# Patient Record
Sex: MALE | Race: WHITE | NOT HISPANIC OR LATINO | Employment: FULL TIME | RURAL
[De-identification: names, ages, dates, MRNs, and addresses within clinical notes are randomized per-mention and may not be internally consistent; named-entity substitution may affect disease eponyms.]

---

## 2019-03-21 ENCOUNTER — CONVERSION ENCOUNTER (OUTPATIENT)
Dept: FAMILY MEDICINE CLINIC | Facility: CLINIC | Age: 39
End: 2019-03-21

## 2019-03-21 LAB
ALBUMIN SERPL-MCNC: 4.9 G/DL (ref 3.6–5.1)
ALP SERPL-CCNC: 43 U/L (ref 40–115)
ALT SERPL-CCNC: 19 U/L (ref 9–46)
AST SERPL-CCNC: 20 U/L (ref 10–40)
BASOPHILS # BLD AUTO: 48 CELLS/UL (ref 0–200)
BASOPHILS NFR BLD AUTO: 1.2 %
BILIRUB SERPL-MCNC: 0.4 MG/DL (ref 0.2–1.2)
BUN SERPL-MCNC: 19 MG/DL (ref 7–25)
BUN/CREAT SERPL: 14 (CALC) (ref 6–22)
CALCIUM SERPL-MCNC: 10 MG/DL (ref 8.6–10.3)
CHLORIDE SERPL-SCNC: 104 MMOL/L (ref 98–110)
CONV CO2: 25 MMOL/L (ref 20–32)
CONV TOTAL PROTEIN: 6.9 G/DL (ref 6.1–8.1)
CREAT UR-MCNC: 1.4 MG/DL (ref 0.6–1.35)
EOSINOPHIL # BLD AUTO: 100 CELLS/UL (ref 15–500)
EOSINOPHIL # BLD AUTO: 2.5 %
ERYTHROCYTE [DISTWIDTH] IN BLOOD BY AUTOMATED COUNT: 12.4 % (ref 11–15)
GLOBULIN UR ELPH-MCNC: 2 MG/DL (ref 1.9–3.7)
GLUCOSE UR QL: 103 MG/DL (ref 65–139)
HCT VFR BLD AUTO: 43 % (ref 38.5–50)
HGB BLD-MCNC: 14.9 G/DL (ref 13.2–17.1)
INSULIN SERPL-ACNC: 2.5 (CALC) (ref 1–2.5)
LYMPHOCYTES # BLD AUTO: 880 CELLS/UL (ref 850–3900)
LYMPHOCYTES NFR BLD AUTO: 22 %
MCH RBC QN AUTO: 32.1 PG (ref 27–33)
MCHC RBC AUTO-ENTMCNC: 34.7 G/DL (ref 32–36)
MCV RBC AUTO: 92.7 FL (ref 80–100)
MONOCYTES # BLD AUTO: 576 CELLS/UL (ref 200–950)
MONOCYTES NFR BLD AUTO: 14.4 %
NEUTROPHILS # BLD AUTO: 2396 CELLS/UL (ref 1500–7800)
NEUTROPHILS NFR BLD AUTO: 59.9 %
PLATELET # BLD AUTO: 245 10*3/UL (ref 140–400)
PMV BLD AUTO: 10.4 FL (ref 7.5–12.5)
POTASSIUM SERPL-SCNC: 4.4 MMOL/L (ref 3.5–5.3)
RBC # BLD AUTO: 4.64 MILLION/UL (ref 4.2–5.8)
SODIUM SERPL-SCNC: 138 MMOL/L (ref 135–146)
TSH SERPL-ACNC: 0.79 MIU/L (ref 0.4–4.5)
WBC # BLD AUTO: 4 10*3/UL (ref 3.8–10.8)

## 2019-08-22 ENCOUNTER — APPOINTMENT (OUTPATIENT)
Dept: CT IMAGING | Facility: HOSPITAL | Age: 39
End: 2019-08-22

## 2019-08-22 ENCOUNTER — HOSPITAL ENCOUNTER (EMERGENCY)
Facility: HOSPITAL | Age: 39
Discharge: HOME | End: 2019-08-22
Admitting: EMERGENCY MEDICINE

## 2019-08-22 VITALS
HEART RATE: 88 BPM | WEIGHT: 160.05 LBS | RESPIRATION RATE: 16 BRPM | OXYGEN SATURATION: 100 % | BODY MASS INDEX: 21.68 KG/M2 | TEMPERATURE: 98 F | DIASTOLIC BLOOD PRESSURE: 89 MMHG | HEIGHT: 72 IN | SYSTOLIC BLOOD PRESSURE: 124 MMHG

## 2019-08-22 DIAGNOSIS — R10.11 RIGHT UPPER QUADRANT ABDOMINAL PAIN: Primary | ICD-10-CM

## 2019-08-22 LAB
ALBUMIN SERPL-MCNC: 4.5 G/DL (ref 3.5–4.8)
ALBUMIN/GLOB SERPL: 2 G/DL (ref 1–1.7)
ALP SERPL-CCNC: 39 U/L (ref 32–91)
ALT SERPL W P-5'-P-CCNC: 20 U/L (ref 17–63)
ANION GAP SERPL CALCULATED.3IONS-SCNC: 15.2 MMOL/L (ref 5–15)
AST SERPL-CCNC: 21 U/L (ref 15–41)
BASOPHILS # BLD AUTO: 0 10*3/MM3 (ref 0–0.2)
BASOPHILS NFR BLD AUTO: 1 % (ref 0–1.5)
BILIRUB SERPL-MCNC: 0.7 MG/DL (ref 0.3–1.2)
BUN BLD-MCNC: 14 MG/DL (ref 8–20)
BUN/CREAT SERPL: 11.7 (ref 6.2–20.3)
CALCIUM SPEC-SCNC: 9.6 MG/DL (ref 8.9–10.3)
CHLORIDE SERPL-SCNC: 106 MMOL/L (ref 101–111)
CO2 SERPL-SCNC: 22 MMOL/L (ref 22–32)
CREAT BLD-MCNC: 1.2 MG/DL (ref 0.7–1.2)
DEPRECATED RDW RBC AUTO: 42.4 FL (ref 37–54)
EOSINOPHIL # BLD AUTO: 0 10*3/MM3 (ref 0–0.4)
EOSINOPHIL NFR BLD AUTO: 0.4 % (ref 0.3–6.2)
ERYTHROCYTE [DISTWIDTH] IN BLOOD BY AUTOMATED COUNT: 12.5 % (ref 12.3–15.4)
GFR SERPL CREATININE-BSD FRML MDRD: 69 ML/MIN/1.73
GLOBULIN UR ELPH-MCNC: 2.3 GM/DL (ref 2.5–3.8)
GLUCOSE BLD-MCNC: 96 MG/DL (ref 65–99)
HCT VFR BLD AUTO: 47.3 % (ref 37.5–51)
HGB BLD-MCNC: 16.2 G/DL (ref 13–17.7)
LIPASE SERPL-CCNC: 34 U/L (ref 22–51)
LYMPHOCYTES # BLD AUTO: 0.9 10*3/MM3 (ref 0.7–3.1)
LYMPHOCYTES NFR BLD AUTO: 24.6 % (ref 19.6–45.3)
MCH RBC QN AUTO: 33.1 PG (ref 26.6–33)
MCHC RBC AUTO-ENTMCNC: 34.2 G/DL (ref 31.5–35.7)
MCV RBC AUTO: 96.6 FL (ref 79–97)
MONOCYTES # BLD AUTO: 0.3 10*3/MM3 (ref 0.1–0.9)
MONOCYTES NFR BLD AUTO: 9.8 % (ref 5–12)
NEUTROPHILS # BLD AUTO: 2.3 10*3/MM3 (ref 1.7–7)
NEUTROPHILS NFR BLD AUTO: 64.2 % (ref 42.7–76)
NRBC BLD AUTO-RTO: 0.1 /100 WBC (ref 0–0.2)
PLATELET # BLD AUTO: 249 10*3/MM3 (ref 140–450)
PMV BLD AUTO: 7.7 FL (ref 6–12)
POTASSIUM BLD-SCNC: 4.2 MMOL/L (ref 3.6–5.1)
PROT SERPL-MCNC: 6.8 G/DL (ref 6.1–7.9)
RBC # BLD AUTO: 4.89 10*6/MM3 (ref 4.14–5.8)
SODIUM BLD-SCNC: 139 MMOL/L (ref 136–144)
WBC NRBC COR # BLD: 3.6 10*3/MM3 (ref 3.4–10.8)

## 2019-08-22 PROCEDURE — 99283 EMERGENCY DEPT VISIT LOW MDM: CPT

## 2019-08-22 PROCEDURE — 25010000002 KETOROLAC TROMETHAMINE PER 15 MG: Performed by: NURSE PRACTITIONER

## 2019-08-22 PROCEDURE — 74177 CT ABD & PELVIS W/CONTRAST: CPT

## 2019-08-22 PROCEDURE — 83690 ASSAY OF LIPASE: CPT | Performed by: NURSE PRACTITIONER

## 2019-08-22 PROCEDURE — 96374 THER/PROPH/DIAG INJ IV PUSH: CPT

## 2019-08-22 PROCEDURE — 85025 COMPLETE CBC W/AUTO DIFF WBC: CPT | Performed by: NURSE PRACTITIONER

## 2019-08-22 PROCEDURE — 0 IOPAMIDOL PER 1 ML: Performed by: NURSE PRACTITIONER

## 2019-08-22 PROCEDURE — 80053 COMPREHEN METABOLIC PANEL: CPT | Performed by: NURSE PRACTITIONER

## 2019-08-22 RX ORDER — TRAMADOL HYDROCHLORIDE 50 MG/1
50 TABLET ORAL EVERY 6 HOURS PRN
Qty: 12 TABLET | Refills: 0 | Status: SHIPPED | OUTPATIENT
Start: 2019-08-22 | End: 2019-12-17

## 2019-08-22 RX ORDER — SODIUM CHLORIDE 0.9 % (FLUSH) 0.9 %
10 SYRINGE (ML) INJECTION AS NEEDED
Status: DISCONTINUED | OUTPATIENT
Start: 2019-08-22 | End: 2019-08-22 | Stop reason: HOSPADM

## 2019-08-22 RX ORDER — KETOROLAC TROMETHAMINE 30 MG/ML
30 INJECTION, SOLUTION INTRAMUSCULAR; INTRAVENOUS ONCE
Status: COMPLETED | OUTPATIENT
Start: 2019-08-22 | End: 2019-08-22

## 2019-08-22 RX ADMIN — KETOROLAC TROMETHAMINE 30 MG: 30 INJECTION, SOLUTION INTRAMUSCULAR at 10:45

## 2019-08-22 RX ADMIN — IOPAMIDOL 100 ML: 755 INJECTION, SOLUTION INTRAVENOUS at 11:56

## 2019-08-22 NOTE — ED PROVIDER NOTES
Subjective   Chief Complaint: Abdominal pain  Context: Patient complains of right upper quadrant abdominal pain that started yesterday.  He reports that has been constant.  He reports that he has had some diarrhea but no nausea, vomiting, fever, or cough.  He has had a prior cholecystectomy.  Duration: Since yesterday  Timing: Constant  Severity: Mild to moderate  Associated symptoms and or modifying factors: As above          History provided by:  Patient      Review of Systems   Constitutional: Negative for chills and fever.   HENT: Negative for congestion and sore throat.    Eyes: Negative for redness.   Respiratory: Negative for cough and shortness of breath.    Cardiovascular: Negative for chest pain.   Gastrointestinal: Positive for abdominal pain and diarrhea. Negative for nausea and vomiting.   Genitourinary: Negative for dysuria.   Musculoskeletal: Negative for back pain.   Skin: Negative for rash.   Neurological: Negative for headaches.   All other systems reviewed and are negative.      Past Medical History:   Diagnosis Date   • Anxiety    • Anxiety    • Depression        Allergies   Allergen Reactions   • Phenergan  [Promethazine Hcl] Hallucinations       Past Surgical History:   Procedure Laterality Date   • APPENDECTOMY     • CHOLECYSTECTOMY     • HYDROCELE EXCISION / REPAIR Left        History reviewed. No pertinent family history.    Social History     Socioeconomic History   • Marital status:      Spouse name: Not on file   • Number of children: Not on file   • Years of education: Not on file   • Highest education level: Not on file   Tobacco Use   • Smoking status: Never Smoker   • Smokeless tobacco: Never Used   Substance and Sexual Activity   • Alcohol use: Yes     Alcohol/week: 1.8 oz     Types: 3 Cans of beer per week   • Drug use: Defer   • Sexual activity: Defer           Objective   Physical Exam  The patient is well-developed, well-nourished, alert, cooperative and in no acute  distress.    HEENT exam is normocephalic and atraumatic. Pupils equal ,round, reactive to light. Extraocular muscles are intact.  Mucous membranes are moist.     Neck is supple, non-tender without lymphadenopathy.   Lungs clear to auscultation bilaterally.    Cardiovascular. Regular rate and rhythm without murmur.    Abdomen is soft nondistended.  Tender to palpate right upper quadrant.  No guarding or rebound noted.     Back shows no CVA tenderness.     Extremities: There is no significant deformity or joint abnormality. No edema.     Neurologic: Oriented x3 without focal neurological deficits.    Skin shows no rash, petechiae, or purpura.    Procedures           ED Course  ED Course as of Aug 22 1218   Thu Aug 22, 2019   1211 WBC 3.6, CMP unremarkable.  Lipase is 34.  CT of the abdomen pelvis has no acute intra-abdominal or pelvic abnormalities.  [AC]      ED Course User Index  [AC] Renee Garcia APRN      Labs Reviewed   COMPREHENSIVE METABOLIC PANEL - Abnormal; Notable for the following components:       Result Value    Globulin 2.3 (*)     A/G Ratio 2.0 (*)     Anion Gap 15.2 (*)     All other components within normal limits   CBC WITH AUTO DIFFERENTIAL - Abnormal; Notable for the following components:    MCH 33.1 (*)     All other components within normal limits   LIPASE - Normal   CBC AND DIFFERENTIAL    Narrative:     The following orders were created for panel order CBC & Differential.  Procedure                               Abnormality         Status                     ---------                               -----------         ------                     CBC Auto Differential[[id number]]        Abnormal            Final result                 Please view results for these tests on the individual orders.     Ct Abdomen Pelvis With Contrast    Result Date: 8/22/2019   1. No acute findings within the abdomen or pelvis. No CT explanation for the patient's abdominal pain. 2. Nonobstructing bilateral renal  "stones. 3. Cholecystectomy, appendectomy.    Electronically Signed By-Dr. Cherie Thompson MD On:8/22/2019 12:05 PM This report was finalized on [id number] by Dr. Cherie Thompson MD.    Medications   sodium chloride 0.9 % flush 10 mL (not administered)   ketorolac (TORADOL) injection 30 mg (30 mg Intravenous Given 8/22/19 1045)   iopamidol (ISOVUE-370) 76 % injection 100 mL (100 mL Intravenous Given 8/22/19 1156)     /87 (BP Location: Left arm, Patient Position: Sitting)   Pulse 88   Temp 98.1 °F (36.7 °C) (Oral)   Resp 16   Ht 182.9 cm (72\")   Wt 72.6 kg (160 lb 0.9 oz)   SpO2 100%   BMI 21.71 kg/m²           MDM  Number of Diagnoses or Management Options  Right upper quadrant abdominal pain:   Diagnosis management comments: MEDICAL DECISION  Comorbidities: Noted in past history  Differentials: biliary obstruction, obstruction, perforation,; this list is not all inclusive and does not constitute the entirety of considered causes  Radiology interpretation:  Images reviewed by me and interpreted by radiologist, CT abdomen pelvis shows no acute intra-abdominal or pelvic abnormalities.  Lab interpretation:  Labs viewed by me significant for, noted above,    Results and plan for discharge were discussed.  Inspect was queried.  Prescription for tramadol.         Amount and/or Complexity of Data Reviewed  Clinical lab tests: reviewed and ordered  Tests in the radiology section of CPT®: reviewed and ordered          Final diagnoses:   Right upper quadrant abdominal pain            Renee Garcia, APRN  08/22/19 1218    "

## 2019-08-22 NOTE — DISCHARGE INSTRUCTIONS
Tylenol or ibuprofen for pain if needed.  Tramadol for more severe pain.  Follow-up with your doctor in the next few days.  Return to the ER for new or worsening concerns.

## 2019-08-22 NOTE — ED NOTES
"Pt c/o RUQ abd pain, \"feeling clammy\" and diarrhea onset 1 day ago.     Clarisse Rider RN  08/22/19 1039    "

## 2019-08-27 ENCOUNTER — OFFICE (OUTPATIENT)
Dept: URBAN - METROPOLITAN AREA CLINIC 64 | Facility: CLINIC | Age: 39
End: 2019-08-27

## 2019-08-27 VITALS
WEIGHT: 160 LBS | HEART RATE: 88 BPM | HEIGHT: 72 IN | DIASTOLIC BLOOD PRESSURE: 86 MMHG | SYSTOLIC BLOOD PRESSURE: 121 MMHG

## 2019-08-27 DIAGNOSIS — R11.0 NAUSEA: ICD-10-CM

## 2019-08-27 DIAGNOSIS — R10.11 RIGHT UPPER QUADRANT PAIN: ICD-10-CM

## 2019-08-27 PROCEDURE — 99203 OFFICE O/P NEW LOW 30 MIN: CPT | Performed by: NURSE PRACTITIONER

## 2019-08-27 RX ORDER — SUCRALFATE 1 G/1
3 TABLET ORAL
Qty: 270 | Refills: 1 | Status: ACTIVE
Start: 2019-08-27

## 2019-08-27 RX ORDER — PANTOPRAZOLE SODIUM 40 MG/1
40 TABLET, DELAYED RELEASE ORAL
Qty: 90 | Refills: 3 | Status: ACTIVE
Start: 2019-08-27

## 2019-12-16 PROBLEM — J30.9 ALLERGIC RHINITIS: Status: ACTIVE | Noted: 2019-12-16

## 2019-12-16 PROBLEM — G43.109 CLASSICAL MIGRAINE: Status: ACTIVE | Noted: 2019-12-16

## 2019-12-16 PROBLEM — K27.9 PUD (PEPTIC ULCER DISEASE): Status: ACTIVE | Noted: 2019-12-16

## 2019-12-16 PROBLEM — L70.2 ACNE VARIOLIFORMIS: Status: ACTIVE | Noted: 2019-12-16

## 2019-12-17 ENCOUNTER — HOSPITAL ENCOUNTER (OUTPATIENT)
Dept: GENERAL RADIOLOGY | Facility: HOSPITAL | Age: 39
Discharge: HOME | End: 2019-12-17
Admitting: FAMILY MEDICINE

## 2019-12-17 ENCOUNTER — OFFICE VISIT (OUTPATIENT)
Dept: FAMILY MEDICINE CLINIC | Facility: CLINIC | Age: 39
End: 2019-12-17

## 2019-12-17 VITALS
OXYGEN SATURATION: 97 % | BODY MASS INDEX: 22.35 KG/M2 | HEART RATE: 94 BPM | WEIGHT: 165 LBS | HEIGHT: 72 IN | TEMPERATURE: 98.5 F | DIASTOLIC BLOOD PRESSURE: 80 MMHG | SYSTOLIC BLOOD PRESSURE: 114 MMHG | RESPIRATION RATE: 18 BRPM

## 2019-12-17 DIAGNOSIS — S61.411D LACERATION OF RIGHT HAND WITHOUT FOREIGN BODY, SUBSEQUENT ENCOUNTER: ICD-10-CM

## 2019-12-17 DIAGNOSIS — M79.641 HAND PAIN, RIGHT: Primary | ICD-10-CM

## 2019-12-17 PROCEDURE — 90471 IMMUNIZATION ADMIN: CPT | Performed by: FAMILY MEDICINE

## 2019-12-17 PROCEDURE — 73130 X-RAY EXAM OF HAND: CPT

## 2019-12-17 PROCEDURE — 99214 OFFICE O/P EST MOD 30 MIN: CPT | Performed by: FAMILY MEDICINE

## 2019-12-17 PROCEDURE — 90714 TD VACC NO PRESV 7 YRS+ IM: CPT | Performed by: FAMILY MEDICINE

## 2019-12-17 RX ORDER — AMOXICILLIN AND CLAVULANATE POTASSIUM 562.5; 437.5; 62.5 MG/1; MG/1; MG/1
2 TABLET, FILM COATED, EXTENDED RELEASE ORAL 2 TIMES DAILY
Qty: 20 TABLET | Refills: 0 | Status: SHIPPED | OUTPATIENT
Start: 2019-12-17 | End: 2021-07-21

## 2019-12-17 RX ORDER — LAMOTRIGINE 200 MG/1
1 TABLET ORAL DAILY
COMMUNITY
Start: 2019-12-16 | End: 2021-07-21

## 2019-12-17 RX ORDER — PANTOPRAZOLE SODIUM 40 MG/1
1 TABLET, DELAYED RELEASE ORAL DAILY
COMMUNITY
Start: 2019-09-11 | End: 2021-07-21

## 2019-12-17 NOTE — PROGRESS NOTES
Subjective   Warren Giraldo is a 38 y.o. male.     Went to [facility] on 11/25/19 due to injury on right knuckle that became infected. Was prescribed Keflex 500mg.     Hand Pain    The incident occurred more than 1 week ago. The incident occurred at home. The injury mechanism was a direct blow (hit hand on light). The pain is present in the right hand (middle knuckle). The quality of the pain is described as aching. Pertinent negatives include no chest pain or numbness. Associated symptoms comments: Red and swollen, pain is now into bone.        The following portions of the patient's history were reviewed and updated as appropriate: allergies, current medications, past family history, past medical history, past social history, past surgical history and problem list.    Patient Active Problem List   Diagnosis   • Acne varioliformis   • Acute stress disorder   • Allergic rhinitis   • Anxiety   • Classical migraine   • Costochondritis   • Depressive disorder   • Gastroesophageal reflux disease with esophagitis   • PUD (peptic ulcer disease)       Current Outpatient Medications on File Prior to Visit   Medication Sig Dispense Refill   • EPINEPHrine (EPIPEN) 0.3 MG/0.3ML solution auto-injector injection Inject 0.3 mg into the appropriate muscle as directed by prescriber.     • lamoTRIgine (LaMICtal) 200 MG tablet Take 1 tablet by mouth Daily.     • LORazepam (ATIVAN) 1 MG tablet TK 1 T PO BID PRN  2   • pantoprazole (PROTONIX) 40 MG EC tablet Take 1 tablet by mouth Daily.     • QUEtiapine XR (SEROQUEL XR) 400 MG 24 hr tablet Take  by mouth Daily.     • venlafaxine XR (EFFEXOR XR) 150 MG 24 hr capsule Take  by mouth.     • [DISCONTINUED] Cetirizine HCl (ZYRTEC ALLERGY) 10 MG capsule Take 1 capsule by mouth Daily.     • [DISCONTINUED] fluticasone (FLONASE) 50 MCG/ACT nasal spray SHAKE LQ AND U 2 SPRAYS IEN D  0   • [DISCONTINUED] lamoTRIgine (LAMICTAL) 100 MG tablet Take  by mouth Daily.     • [DISCONTINUED]  traMADol (ULTRAM) 50 MG tablet Take 1 tablet by mouth Every 6 (Six) Hours As Needed for Moderate Pain . 12 tablet 0     No current facility-administered medications on file prior to visit.        Allergies   Allergen Reactions   • Phenergan  [Promethazine Hcl] Hallucinations       Review of Systems   Constitutional: Negative for activity change, appetite change, fatigue and fever.   HENT: Negative for ear pain, swollen glands and voice change.    Eyes: Negative for visual disturbance.   Respiratory: Negative for shortness of breath and wheezing.    Cardiovascular: Negative for chest pain and leg swelling.   Gastrointestinal: Negative for abdominal pain, blood in stool, constipation, diarrhea, nausea and vomiting.   Endocrine: Negative for polydipsia and polyuria.   Genitourinary: Negative for dysuria, frequency and hematuria.   Musculoskeletal: Negative for joint swelling, neck pain and neck stiffness.   Skin: Negative for rash and bruise.   Neurological: Negative for weakness, numbness and headache.   Psychiatric/Behavioral: Negative for suicidal ideas and depressed mood.       Objective   Vitals:    12/17/19 0832   BP: 114/80   Pulse: 94   Resp: 18   Temp: 98.5 °F (36.9 °C)   SpO2: 97%     Physical Exam   Constitutional: He is oriented to person, place, and time. He appears well-developed and well-nourished.   Eyes: Pupils are equal, round, and reactive to light. Conjunctivae and EOM are normal.   Neck: Normal range of motion. Neck supple.   Cardiovascular: Normal rate, regular rhythm and normal heart sounds.   Pulmonary/Chest: Effort normal and breath sounds normal.   Abdominal: Soft. Bowel sounds are normal.   Musculoskeletal: Normal range of motion.   Enlargement of middle finger MTP joint. Erythematous, tender. No flocculence   Neurological: He is alert and oriented to person, place, and time.   Skin: Skin is warm and dry.   Psychiatric: He has a normal mood and affect. His behavior is normal. Judgment and  thought content normal.         Assessment/Plan .  Problem List Items Addressed This Visit     None      Visit Diagnoses     Hand pain, right    -  Primary    Relevant Orders    CBC Auto Differential    Sedimentation Rate    XR Hand 3+ View Right    Laceration of right hand without foreign body, subsequent encounter        Relevant Medications    amoxicillin-clavulanate XR (AUGMENTIN XR) 1000-62.5 MG per 12 hr tablet    Other Relevant Orders    Td (adult) Vaccine Not Adsorbed    XR Hand 3+ View Right      Findings discussed. All questions answered.  Differential diagnosis discussed.   Follow-up after testing complete, sooner for worsening symptoms or any concerns   Ortho eval if s/sx osteomyelitis show on testing  Medication and medication adverse effects discussed.  Drug education given and explained to patient. Patient verbalized understanding.

## 2019-12-18 LAB
BASOPHILS # BLD AUTO: 0 X10E3/UL (ref 0–0.2)
BASOPHILS NFR BLD AUTO: 1 %
EOSINOPHIL # BLD AUTO: 0.1 X10E3/UL (ref 0–0.4)
EOSINOPHIL NFR BLD AUTO: 3 %
ERYTHROCYTE [DISTWIDTH] IN BLOOD BY AUTOMATED COUNT: 13.3 % (ref 12.3–15.4)
ERYTHROCYTE [SEDIMENTATION RATE] IN BLOOD BY WESTERGREN METHOD: 2 MM/HR (ref 0–15)
HCT VFR BLD AUTO: 46.8 % (ref 37.5–51)
HGB BLD-MCNC: 15.4 G/DL (ref 13–17.7)
IMM GRANULOCYTES # BLD AUTO: 0 X10E3/UL (ref 0–0.1)
IMM GRANULOCYTES NFR BLD AUTO: 1 %
LYMPHOCYTES # BLD AUTO: 1.6 X10E3/UL (ref 0.7–3.1)
LYMPHOCYTES NFR BLD AUTO: 33 %
MCH RBC QN AUTO: 31.8 PG (ref 26.6–33)
MCHC RBC AUTO-ENTMCNC: 32.9 G/DL (ref 31.5–35.7)
MCV RBC AUTO: 97 FL (ref 79–97)
MONOCYTES # BLD AUTO: 0.6 X10E3/UL (ref 0.1–0.9)
MONOCYTES NFR BLD AUTO: 13 %
NEUTROPHILS # BLD AUTO: 2.3 X10E3/UL (ref 1.4–7)
NEUTROPHILS NFR BLD AUTO: 49 %
PLATELET # BLD AUTO: 286 X10E3/UL (ref 150–450)
RBC # BLD AUTO: 4.85 X10E6/UL (ref 4.14–5.8)
WBC # BLD AUTO: 4.8 X10E3/UL (ref 3.4–10.8)

## 2019-12-20 ENCOUNTER — TELEPHONE (OUTPATIENT)
Dept: FAMILY MEDICINE CLINIC | Facility: CLINIC | Age: 39
End: 2019-12-20

## 2019-12-20 NOTE — TELEPHONE ENCOUNTER
----- Message from Warren Giraldo sent at 12/19/2019  3:17 PM EST -----  Regarding: Test Results Question  Contact: [phone]  I have a question about SEDIMENTATION RATE, AUTOMATED resulted on 12/18/19 at 7:36 AM.    Do I continue to take the antibiotics?

## 2020-03-19 ENCOUNTER — TELEPHONE (OUTPATIENT)
Dept: FAMILY MEDICINE CLINIC | Facility: CLINIC | Age: 40
End: 2020-03-19

## 2020-03-19 NOTE — TELEPHONE ENCOUNTER
Pt called.  States he has has a fever for past few days of 101 but fever broke today-also has body aches, h/a, no cough, no shortness of breath.  He's been off work because of this and needs to know when he can return to and will need papers filled out for returning to work. Notified pt per Dr. Sandy wait until 3 days fever free and then f/u for appt and bring paperwork. He will f/u sometime next week.

## 2020-03-20 ENCOUNTER — APPOINTMENT (OUTPATIENT)
Dept: CT IMAGING | Facility: HOSPITAL | Age: 40
End: 2020-03-20

## 2020-03-20 ENCOUNTER — APPOINTMENT (OUTPATIENT)
Dept: GENERAL RADIOLOGY | Facility: HOSPITAL | Age: 40
End: 2020-03-20

## 2020-03-20 ENCOUNTER — HOSPITAL ENCOUNTER (EMERGENCY)
Facility: HOSPITAL | Age: 40
Discharge: HOME | End: 2020-03-20
Admitting: EMERGENCY MEDICINE

## 2020-03-20 VITALS
RESPIRATION RATE: 16 BRPM | WEIGHT: 172.84 LBS | HEIGHT: 72 IN | OXYGEN SATURATION: 99 % | SYSTOLIC BLOOD PRESSURE: 127 MMHG | DIASTOLIC BLOOD PRESSURE: 81 MMHG | HEART RATE: 98 BPM | TEMPERATURE: 98.6 F | BODY MASS INDEX: 23.41 KG/M2

## 2020-03-20 DIAGNOSIS — B34.9 VIRAL SYNDROME: ICD-10-CM

## 2020-03-20 DIAGNOSIS — R50.9 FEVER, UNSPECIFIED FEVER CAUSE: Primary | ICD-10-CM

## 2020-03-20 DIAGNOSIS — R05.9 COUGH: ICD-10-CM

## 2020-03-20 DIAGNOSIS — R51.9 ACUTE INTRACTABLE HEADACHE, UNSPECIFIED HEADACHE TYPE: ICD-10-CM

## 2020-03-20 LAB
ALBUMIN SERPL-MCNC: 4.7 G/DL (ref 3.5–5.2)
ALBUMIN/GLOB SERPL: 2.1 G/DL
ALP SERPL-CCNC: 48 U/L (ref 39–117)
ALT SERPL W P-5'-P-CCNC: 27 U/L (ref 1–41)
ANION GAP SERPL CALCULATED.3IONS-SCNC: 10 MMOL/L (ref 5–15)
AST SERPL-CCNC: 22 U/L (ref 1–40)
B PERT DNA SPEC QL NAA+PROBE: NOT DETECTED
BASOPHILS # BLD AUTO: 0.1 10*3/MM3 (ref 0–0.2)
BASOPHILS NFR BLD AUTO: 1 % (ref 0–1.5)
BILIRUB SERPL-MCNC: 0.3 MG/DL (ref 0.2–1.2)
BUN BLD-MCNC: 11 MG/DL (ref 6–20)
BUN/CREAT SERPL: 10.1 (ref 7–25)
C PNEUM DNA NPH QL NAA+NON-PROBE: NOT DETECTED
CALCIUM SPEC-SCNC: 10 MG/DL (ref 8.6–10.5)
CHLORIDE SERPL-SCNC: 104 MMOL/L (ref 98–107)
CO2 SERPL-SCNC: 26 MMOL/L (ref 22–29)
CREAT BLD-MCNC: 1.09 MG/DL (ref 0.76–1.27)
DEPRECATED RDW RBC AUTO: 41.1 FL (ref 37–54)
EOSINOPHIL # BLD AUTO: 0.1 10*3/MM3 (ref 0–0.4)
EOSINOPHIL NFR BLD AUTO: 1 % (ref 0.3–6.2)
ERYTHROCYTE [DISTWIDTH] IN BLOOD BY AUTOMATED COUNT: 12.4 % (ref 12.3–15.4)
FLUAV H1 2009 PAND RNA NPH QL NAA+PROBE: NOT DETECTED
FLUAV H1 HA GENE NPH QL NAA+PROBE: NOT DETECTED
FLUAV H3 RNA NPH QL NAA+PROBE: NOT DETECTED
FLUAV SUBTYP SPEC NAA+PROBE: NOT DETECTED
FLUBV RNA ISLT QL NAA+PROBE: NOT DETECTED
GFR SERPL CREATININE-BSD FRML MDRD: 76 ML/MIN/1.73
GLOBULIN UR ELPH-MCNC: 2.2 GM/DL
GLUCOSE BLD-MCNC: 118 MG/DL (ref 65–99)
HADV DNA SPEC NAA+PROBE: NOT DETECTED
HCOV 229E RNA SPEC QL NAA+PROBE: NOT DETECTED
HCOV HKU1 RNA SPEC QL NAA+PROBE: NOT DETECTED
HCOV NL63 RNA SPEC QL NAA+PROBE: NOT DETECTED
HCOV OC43 RNA SPEC QL NAA+PROBE: NOT DETECTED
HCT VFR BLD AUTO: 44.4 % (ref 37.5–51)
HGB BLD-MCNC: 15.7 G/DL (ref 13–17.7)
HMPV RNA NPH QL NAA+NON-PROBE: NOT DETECTED
HPIV1 RNA SPEC QL NAA+PROBE: NOT DETECTED
HPIV2 RNA SPEC QL NAA+PROBE: NOT DETECTED
HPIV3 RNA NPH QL NAA+PROBE: NOT DETECTED
HPIV4 P GENE NPH QL NAA+PROBE: NOT DETECTED
LYMPHOCYTES # BLD AUTO: 1.2 10*3/MM3 (ref 0.7–3.1)
LYMPHOCYTES NFR BLD AUTO: 22.3 % (ref 19.6–45.3)
M PNEUMO IGG SER IA-ACNC: NOT DETECTED
MCH RBC QN AUTO: 33.2 PG (ref 26.6–33)
MCHC RBC AUTO-ENTMCNC: 35.4 G/DL (ref 31.5–35.7)
MCV RBC AUTO: 93.9 FL (ref 79–97)
MONOCYTES # BLD AUTO: 0.7 10*3/MM3 (ref 0.1–0.9)
MONOCYTES NFR BLD AUTO: 12.3 % (ref 5–12)
NEUTROPHILS # BLD AUTO: 3.3 10*3/MM3 (ref 1.7–7)
NEUTROPHILS NFR BLD AUTO: 63.4 % (ref 42.7–76)
NRBC BLD AUTO-RTO: 0 /100 WBC (ref 0–0.2)
PLATELET # BLD AUTO: 274 10*3/MM3 (ref 140–450)
PMV BLD AUTO: 7.3 FL (ref 6–12)
POTASSIUM BLD-SCNC: 5 MMOL/L (ref 3.5–5.2)
PROT SERPL-MCNC: 6.9 G/DL (ref 6–8.5)
RBC # BLD AUTO: 4.73 10*6/MM3 (ref 4.14–5.8)
RHINOVIRUS RNA SPEC NAA+PROBE: NOT DETECTED
RSV RNA NPH QL NAA+NON-PROBE: NOT DETECTED
SODIUM BLD-SCNC: 140 MMOL/L (ref 136–145)
WBC NRBC COR # BLD: 5.3 10*3/MM3 (ref 3.4–10.8)

## 2020-03-20 PROCEDURE — 96375 TX/PRO/DX INJ NEW DRUG ADDON: CPT

## 2020-03-20 PROCEDURE — 25010000002 DIPHENHYDRAMINE PER 50 MG: Performed by: PHYSICIAN ASSISTANT

## 2020-03-20 PROCEDURE — 87635 SARS-COV-2 COVID-19 AMP PRB: CPT | Performed by: PHYSICIAN ASSISTANT

## 2020-03-20 PROCEDURE — 71045 X-RAY EXAM CHEST 1 VIEW: CPT

## 2020-03-20 PROCEDURE — 70450 CT HEAD/BRAIN W/O DYE: CPT

## 2020-03-20 PROCEDURE — 80053 COMPREHEN METABOLIC PANEL: CPT | Performed by: PHYSICIAN ASSISTANT

## 2020-03-20 PROCEDURE — 25010000002 METOCLOPRAMIDE PER 10 MG: Performed by: PHYSICIAN ASSISTANT

## 2020-03-20 PROCEDURE — U0003 INFECTIOUS AGENT DETECTION BY NUCLEIC ACID (DNA OR RNA); SEVERE ACUTE RESPIRATORY SYNDROME CORONAVIRUS 2 (SARS-COV-2) (CORONAVIRUS DISEASE [COVID-19]), AMPLIFIED PROBE TECHNIQUE, MAKING USE OF HIGH THROUGHPUT TECHNOLOGIES AS DESCRIBED BY CMS-2020-01-R: HCPCS | Performed by: PHYSICIAN ASSISTANT

## 2020-03-20 PROCEDURE — 99284 EMERGENCY DEPT VISIT MOD MDM: CPT

## 2020-03-20 PROCEDURE — 85025 COMPLETE CBC W/AUTO DIFF WBC: CPT | Performed by: PHYSICIAN ASSISTANT

## 2020-03-20 PROCEDURE — 0099U HC BIOFIRE FILMARRAY RESP PANEL 1: CPT | Performed by: PHYSICIAN ASSISTANT

## 2020-03-20 PROCEDURE — 96374 THER/PROPH/DIAG INJ IV PUSH: CPT

## 2020-03-20 RX ORDER — DIPHENHYDRAMINE HYDROCHLORIDE 50 MG/ML
25 INJECTION INTRAMUSCULAR; INTRAVENOUS ONCE
Status: COMPLETED | OUTPATIENT
Start: 2020-03-20 | End: 2020-03-20

## 2020-03-20 RX ORDER — ACETAMINOPHEN 500 MG
1000 TABLET ORAL ONCE
Status: COMPLETED | OUTPATIENT
Start: 2020-03-20 | End: 2020-03-20

## 2020-03-20 RX ORDER — SODIUM CHLORIDE 0.9 % (FLUSH) 0.9 %
10 SYRINGE (ML) INJECTION AS NEEDED
Status: DISCONTINUED | OUTPATIENT
Start: 2020-03-20 | End: 2020-03-21 | Stop reason: HOSPADM

## 2020-03-20 RX ORDER — METOCLOPRAMIDE HYDROCHLORIDE 5 MG/ML
10 INJECTION INTRAMUSCULAR; INTRAVENOUS ONCE
Status: COMPLETED | OUTPATIENT
Start: 2020-03-20 | End: 2020-03-20

## 2020-03-20 RX ORDER — ALBUTEROL SULFATE 90 UG/1
2 AEROSOL, METERED RESPIRATORY (INHALATION) EVERY 4 HOURS PRN
Qty: 1 INHALER | Refills: 0 | Status: SHIPPED | OUTPATIENT
Start: 2020-03-20 | End: 2021-07-21

## 2020-03-20 RX ORDER — BENZONATATE 200 MG/1
200 CAPSULE ORAL 3 TIMES DAILY PRN
Qty: 30 CAPSULE | Refills: 0 | Status: SHIPPED | OUTPATIENT
Start: 2020-03-20 | End: 2021-07-21

## 2020-03-20 RX ADMIN — DIPHENHYDRAMINE HYDROCHLORIDE 25 MG: 50 INJECTION, SOLUTION INTRAMUSCULAR; INTRAVENOUS at 21:23

## 2020-03-20 RX ADMIN — ACETAMINOPHEN 1000 MG: 500 TABLET, FILM COATED ORAL at 19:36

## 2020-03-20 RX ADMIN — SODIUM CHLORIDE 1000 ML: 900 INJECTION, SOLUTION INTRAVENOUS at 19:36

## 2020-03-20 RX ADMIN — METOCLOPRAMIDE 10 MG: 5 INJECTION, SOLUTION INTRAMUSCULAR; INTRAVENOUS at 21:23

## 2020-03-23 ENCOUNTER — TELEPHONE (OUTPATIENT)
Dept: FAMILY MEDICINE CLINIC | Facility: CLINIC | Age: 40
End: 2020-03-23

## 2020-03-23 NOTE — TELEPHONE ENCOUNTER
Warren went to [facility] ER on 3/20/20 due to URI symptoms. Was tested for COVID 19. Patient called asking about test results, if he needed to come in to be seen and if he needed to stay in isolation. Informed pt results were not back and to stay in isolation for at least 2 weeks. Says he's feeling fine now, no symptoms.     He also asked when he could return to work. Please advise.

## 2020-03-25 ENCOUNTER — E-VISIT (OUTPATIENT)
Dept: FAMILY MEDICINE CLINIC | Facility: TELEHEALTH | Age: 40
End: 2020-03-25

## 2020-03-25 NOTE — PROGRESS NOTES
I counseled the patient to follow up with Patient Mistake     He was asking when he could expect test results for an ER visit where he was tested for covid19.

## 2020-03-30 ENCOUNTER — TELEPHONE (OUTPATIENT)
Dept: FAMILY MEDICINE CLINIC | Facility: CLINIC | Age: 40
End: 2020-03-30

## 2020-03-30 NOTE — TELEPHONE ENCOUNTER
Pt called.  States he went to ER on 3-20 and was tested for covid 19. Pt states he got a phone call stating it was negative and needs a note to return to work.  Informed pt the CoV-2 test shows still pending in computer so we will need evidence showing it is negative before we can give a work note. Also if it is negative he cannot go back to work until 4-4 (14 days from his testing on 3-20) He will look into finding results and will let us know.

## 2020-03-31 ENCOUNTER — TELEPHONE (OUTPATIENT)
Dept: FAMILY MEDICINE CLINIC | Facility: CLINIC | Age: 40
End: 2020-03-31

## 2020-03-31 NOTE — TELEPHONE ENCOUNTER
Spoke with pt regarding work note.  Per Dr. Sandy ok to give note to return to work on 4-4 regardless of test results because pt has been quarantined for 14 days and is symptom free.  Note left up front to .

## 2020-04-02 ENCOUNTER — TELEPHONE (OUTPATIENT)
Dept: FAMILY MEDICINE CLINIC | Facility: CLINIC | Age: 40
End: 2020-04-02

## 2020-04-02 LAB — SARS-COV-2 RNA RESP QL NAA+PROBE: NOT DETECTED

## 2020-04-02 NOTE — TELEPHONE ENCOUNTER
Pt called.  States employer needs another return to work note-will not accept the one given.  Must state Covid-19 negative on note. Pt notified we just received lab result today so note is fine.  Will leave up front to .

## 2021-07-23 ENCOUNTER — OFFICE VISIT (OUTPATIENT)
Dept: FAMILY MEDICINE CLINIC | Facility: CLINIC | Age: 41
End: 2021-07-23

## 2021-07-23 VITALS
SYSTOLIC BLOOD PRESSURE: 124 MMHG | HEART RATE: 102 BPM | BODY MASS INDEX: 22.59 KG/M2 | TEMPERATURE: 97.9 F | HEIGHT: 72 IN | WEIGHT: 166.8 LBS | OXYGEN SATURATION: 98 % | RESPIRATION RATE: 18 BRPM | DIASTOLIC BLOOD PRESSURE: 85 MMHG

## 2021-07-23 DIAGNOSIS — R53.83 MALAISE AND FATIGUE: Primary | ICD-10-CM
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
## 2021-07-23 DIAGNOSIS — R53.81 MALAISE AND FATIGUE: Primary | ICD-10-CM

## 2021-07-23 DIAGNOSIS — T78.1XXA ALLERGIC REACTION TO ALPHA-GAL: ICD-10-CM

## 2021-07-23 DIAGNOSIS — R06.02 SHORTNESS OF BREATH: ICD-10-CM

## 2021-07-23 PROCEDURE — 99213 OFFICE O/P EST LOW 20 MIN: CPT | Performed by: FAMILY MEDICINE

## 2021-07-23 RX ORDER — PREDNISONE 20 MG/1
40 TABLET ORAL DAILY
Qty: 10 TABLET | Refills: 0 | Status: SHIPPED | OUTPATIENT
Start: 2021-07-23 | End: 2021-07-28

## 2021-07-23 RX ORDER — ALBUTEROL SULFATE 90 UG/1
2 AEROSOL, METERED RESPIRATORY (INHALATION) EVERY 4 HOURS PRN
Qty: 18 G | Refills: 2 | Status: ON HOLD | OUTPATIENT
Start: 2021-07-23 | End: 2021-08-03

## 2021-07-23 RX ORDER — DOXYCYCLINE 100 MG/1
100 CAPSULE ORAL 2 TIMES DAILY
Qty: 28 CAPSULE | Refills: 0 | Status: SHIPPED | OUTPATIENT
Start: 2021-07-23 | End: 2021-08-06

## 2021-07-24 LAB
25(OH)D3+25(OH)D2 SERPL-MCNC: 31 NG/ML (ref 30–100)
ALBUMIN SERPL-MCNC: 4.8 G/DL (ref 4–5)
ALBUMIN/GLOB SERPL: 1.9 {RATIO} (ref 1.2–2.2)
ALP SERPL-CCNC: 59 IU/L (ref 48–121)
ALT SERPL-CCNC: 21 IU/L (ref 0–44)
AST SERPL-CCNC: 19 IU/L (ref 0–40)
BASOPHILS # BLD AUTO: 0 X10E3/UL (ref 0–0.2)
BASOPHILS NFR BLD AUTO: 0 %
BILIRUB SERPL-MCNC: 0.3 MG/DL (ref 0–1.2)
BUN SERPL-MCNC: 14 MG/DL (ref 6–20)
BUN/CREAT SERPL: 13 (ref 9–20)
CALCIUM SERPL-MCNC: 9.3 MG/DL (ref 8.7–10.2)
CHLORIDE SERPL-SCNC: 105 MMOL/L (ref 96–106)
CO2 SERPL-SCNC: 23 MMOL/L (ref 20–29)
CREAT SERPL-MCNC: 1.05 MG/DL (ref 0.76–1.27)
EOSINOPHIL # BLD AUTO: 0 X10E3/UL (ref 0–0.4)
EOSINOPHIL NFR BLD AUTO: 0 %
ERYTHROCYTE [DISTWIDTH] IN BLOOD BY AUTOMATED COUNT: 12.9 % (ref 11.6–15.4)
FOLATE SERPL-MCNC: 17.8 NG/ML
GLOBULIN SER CALC-MCNC: 2.5 G/DL (ref 1.5–4.5)
GLUCOSE SERPL-MCNC: 109 MG/DL (ref 65–99)
HCT VFR BLD AUTO: 48.4 % (ref 37.5–51)
HGB BLD-MCNC: 16.3 G/DL (ref 13–17.7)
IMM GRANULOCYTES # BLD AUTO: 0 X10E3/UL (ref 0–0.1)
IMM GRANULOCYTES NFR BLD AUTO: 1 %
LYMPHOCYTES # BLD AUTO: 0.7 X10E3/UL (ref 0.7–3.1)
LYMPHOCYTES NFR BLD AUTO: 12 %
MCH RBC QN AUTO: 32.4 PG (ref 26.6–33)
MCHC RBC AUTO-ENTMCNC: 33.7 G/DL (ref 31.5–35.7)
MCV RBC AUTO: 96 FL (ref 79–97)
MONOCYTES # BLD AUTO: 0.2 X10E3/UL (ref 0.1–0.9)
MONOCYTES NFR BLD AUTO: 3 %
NEUTROPHILS # BLD AUTO: 5.2 X10E3/UL (ref 1.4–7)
NEUTROPHILS NFR BLD AUTO: 84 %
PLATELET # BLD AUTO: 303 X10E3/UL (ref 150–450)
POTASSIUM SERPL-SCNC: 5 MMOL/L (ref 3.5–5.2)
PROT SERPL-MCNC: 7.3 G/DL (ref 6–8.5)
RBC # BLD AUTO: 5.03 X10E6/UL (ref 4.14–5.8)
SODIUM SERPL-SCNC: 142 MMOL/L (ref 134–144)
TSH SERPL DL<=0.005 MIU/L-ACNC: 0.47 UIU/ML (ref 0.45–4.5)
VIT B12 SERPL-MCNC: 579 PG/ML (ref 232–1245)
WBC # BLD AUTO: 6.2 X10E3/UL (ref 3.4–10.8)

## 2021-07-31 ENCOUNTER — APPOINTMENT (OUTPATIENT)
Dept: CT IMAGING | Facility: HOSPITAL | Age: 41
End: 2021-07-31

## 2021-07-31 ENCOUNTER — APPOINTMENT (OUTPATIENT)
Dept: GENERAL RADIOLOGY | Facility: HOSPITAL | Age: 41
End: 2021-07-31

## 2021-07-31 ENCOUNTER — HOSPITAL ENCOUNTER (INPATIENT)
Facility: HOSPITAL | Age: 41
LOS: 3 days | Discharge: HOME | End: 2021-08-04
Attending: EMERGENCY MEDICINE | Admitting: INTERNAL MEDICINE

## 2021-07-31 DIAGNOSIS — T78.1XXA ALLERGIC REACTION TO ALPHA-GAL: ICD-10-CM

## 2021-07-31 DIAGNOSIS — J96.00 ACUTE RESPIRATORY FAILURE, UNSPECIFIED WHETHER WITH HYPOXIA OR HYPERCAPNIA (HCC): Primary | ICD-10-CM

## 2021-07-31 LAB
ALBUMIN SERPL-MCNC: 4.7 G/DL (ref 3.5–5.2)
ALBUMIN/GLOB SERPL: 2 G/DL
ALP SERPL-CCNC: 53 U/L (ref 39–117)
ALT SERPL W P-5'-P-CCNC: 24 U/L (ref 1–41)
AMPHET+METHAMPHET UR QL: NEGATIVE
ANION GAP SERPL CALCULATED.3IONS-SCNC: 17 MMOL/L (ref 5–15)
APAP SERPL-MCNC: <5 MCG/ML (ref 0–30)
APTT PPP: 20.6 SECONDS (ref 24–31)
ARTERIAL PATENCY WRIST A: POSITIVE
AST SERPL-CCNC: 23 U/L (ref 1–40)
ATMOSPHERIC PRESS: ABNORMAL MM[HG]
BARBITURATES UR QL SCN: NEGATIVE
BASE EXCESS BLDA CALC-SCNC: -2 MMOL/L (ref 0–3)
BASOPHILS # BLD AUTO: 0.1 10*3/MM3 (ref 0–0.2)
BASOPHILS NFR BLD AUTO: 0.6 % (ref 0–1.5)
BDY SITE: ABNORMAL
BENZODIAZ UR QL SCN: NEGATIVE
BILIRUB SERPL-MCNC: 0.3 MG/DL (ref 0–1.2)
BILIRUB UR QL STRIP: NEGATIVE
BUN SERPL-MCNC: 12 MG/DL (ref 6–20)
BUN/CREAT SERPL: 9.8 (ref 7–25)
CA-I BLDA-SCNC: 0.83 MMOL/L (ref 1.15–1.33)
CALCIUM SPEC-SCNC: 9.5 MG/DL (ref 8.6–10.5)
CANNABINOIDS SERPL QL: NEGATIVE
CHLORIDE SERPL-SCNC: 106 MMOL/L (ref 98–107)
CK SERPL-CCNC: 109 U/L (ref 20–200)
CLARITY UR: CLEAR
CO2 BLDA-SCNC: 19.4 MMOL/L (ref 22–29)
CO2 SERPL-SCNC: 20 MMOL/L (ref 22–29)
COCAINE UR QL: NEGATIVE
COLOR UR: YELLOW
CREAT SERPL-MCNC: 1.23 MG/DL (ref 0.76–1.27)
D-LACTATE SERPL-SCNC: 6.5 MMOL/L (ref 0.5–2)
DEPRECATED RDW RBC AUTO: 43.3 FL (ref 37–54)
EOSINOPHIL # BLD AUTO: 0.1 10*3/MM3 (ref 0–0.4)
EOSINOPHIL NFR BLD AUTO: 1.4 % (ref 0.3–6.2)
ERYTHROCYTE [DISTWIDTH] IN BLOOD BY AUTOMATED COUNT: 12.9 % (ref 12.3–15.4)
ETHANOL UR QL: 0.16 %
GFR SERPL CREATININE-BSD FRML MDRD: 66 ML/MIN/1.73
GLOBULIN UR ELPH-MCNC: 2.3 GM/DL
GLUCOSE BLDC GLUCOMTR-MCNC: 110 MG/DL (ref 74–100)
GLUCOSE BLDC GLUCOMTR-MCNC: 110 MG/DL (ref 74–100)
GLUCOSE SERPL-MCNC: 99 MG/DL (ref 65–99)
GLUCOSE UR STRIP-MCNC: NEGATIVE MG/DL
HCO3 BLDA-SCNC: 18.7 MMOL/L (ref 21–28)
HCT VFR BLD AUTO: 47.9 % (ref 37.5–51)
HCT VFR BLDA CALC: 43 % (ref 38–51)
HEMODILUTION: NO
HGB BLD-MCNC: 16.2 G/DL (ref 13–17.7)
HGB BLDA-MCNC: 14.7 G/DL (ref 12–17)
HGB UR QL STRIP.AUTO: NEGATIVE
INHALED O2 CONCENTRATION: 40 %
INR PPP: 1.01 (ref 0.93–1.1)
KETONES UR QL STRIP: NEGATIVE
LEUKOCYTE ESTERASE UR QL STRIP.AUTO: NEGATIVE
LIPASE SERPL-CCNC: 29 U/L (ref 13–60)
LYMPHOCYTES # BLD AUTO: 2.5 10*3/MM3 (ref 0.7–3.1)
LYMPHOCYTES NFR BLD AUTO: 30.8 % (ref 19.6–45.3)
MAGNESIUM SERPL-MCNC: 2.2 MG/DL (ref 1.6–2.6)
MCH RBC QN AUTO: 32.6 PG (ref 26.6–33)
MCHC RBC AUTO-ENTMCNC: 33.7 G/DL (ref 31.5–35.7)
MCV RBC AUTO: 96.6 FL (ref 79–97)
METHADONE UR QL SCN: NEGATIVE
MODALITY: ABNORMAL
MONOCYTES # BLD AUTO: 0.9 10*3/MM3 (ref 0.1–0.9)
MONOCYTES NFR BLD AUTO: 10.7 % (ref 5–12)
NEUTROPHILS NFR BLD AUTO: 4.5 10*3/MM3 (ref 1.7–7)
NEUTROPHILS NFR BLD AUTO: 56.5 % (ref 42.7–76)
NITRITE UR QL STRIP: NEGATIVE
NRBC BLD AUTO-RTO: 0.1 /100 WBC (ref 0–0.2)
OPIATES UR QL: NEGATIVE
OXYCODONE UR QL SCN: NEGATIVE
PCO2 BLDA: 22.6 MM HG (ref 35–48)
PH BLDA: 7.53 PH UNITS (ref 7.35–7.45)
PH UR STRIP.AUTO: 6 [PH] (ref 5–8)
PLATELET # BLD AUTO: 314 10*3/MM3 (ref 140–450)
PMV BLD AUTO: 8.2 FL (ref 6–12)
PO2 BLDA: 114.6 MM HG (ref 83–108)
POTASSIUM BLDA-SCNC: 4.1 MMOL/L (ref 3.5–4.5)
POTASSIUM SERPL-SCNC: 4.2 MMOL/L (ref 3.5–5.2)
PROCALCITONIN SERPL-MCNC: 0.03 NG/ML (ref 0–0.25)
PROT SERPL-MCNC: 7 G/DL (ref 6–8.5)
PROT UR QL STRIP: NEGATIVE
PROTHROMBIN TIME: 11.2 SECONDS (ref 9.6–11.7)
QT INTERVAL: 339 MS
RBC # BLD AUTO: 4.96 10*6/MM3 (ref 4.14–5.8)
SALICYLATES SERPL-MCNC: <0.3 MG/DL
SAO2 % BLDCOA: 99 % (ref 94–98)
SODIUM BLD-SCNC: 144 MMOL/L (ref 138–146)
SODIUM SERPL-SCNC: 143 MMOL/L (ref 136–145)
SP GR UR STRIP: <=1.005 (ref 1–1.03)
TROPONIN T SERPL-MCNC: <0.01 NG/ML (ref 0–0.03)
TSH SERPL DL<=0.05 MIU/L-ACNC: 1.66 UIU/ML (ref 0.27–4.2)
UROBILINOGEN UR QL STRIP: NORMAL
WBC # BLD AUTO: 8 10*3/MM3 (ref 3.4–10.8)

## 2021-07-31 PROCEDURE — 85610 PROTHROMBIN TIME: CPT | Performed by: EMERGENCY MEDICINE

## 2021-07-31 PROCEDURE — 84443 ASSAY THYROID STIM HORMONE: CPT | Performed by: EMERGENCY MEDICINE

## 2021-07-31 PROCEDURE — 0 IOPAMIDOL PER 1 ML: Performed by: EMERGENCY MEDICINE

## 2021-07-31 PROCEDURE — 71045 X-RAY EXAM CHEST 1 VIEW: CPT

## 2021-07-31 PROCEDURE — 82962 GLUCOSE BLOOD TEST: CPT

## 2021-07-31 PROCEDURE — 83690 ASSAY OF LIPASE: CPT | Performed by: EMERGENCY MEDICINE

## 2021-07-31 PROCEDURE — 80307 DRUG TEST PRSMV CHEM ANLYZR: CPT | Performed by: EMERGENCY MEDICINE

## 2021-07-31 PROCEDURE — 81003 URINALYSIS AUTO W/O SCOPE: CPT | Performed by: EMERGENCY MEDICINE

## 2021-07-31 PROCEDURE — 74177 CT ABD & PELVIS W/CONTRAST: CPT

## 2021-07-31 PROCEDURE — 84484 ASSAY OF TROPONIN QUANT: CPT | Performed by: EMERGENCY MEDICINE

## 2021-07-31 PROCEDURE — 36600 WITHDRAWAL OF ARTERIAL BLOOD: CPT

## 2021-07-31 PROCEDURE — 80143 DRUG ASSAY ACETAMINOPHEN: CPT | Performed by: EMERGENCY MEDICINE

## 2021-07-31 PROCEDURE — 84145 PROCALCITONIN (PCT): CPT | Performed by: EMERGENCY MEDICINE

## 2021-07-31 PROCEDURE — 82550 ASSAY OF CK (CPK): CPT | Performed by: EMERGENCY MEDICINE

## 2021-07-31 PROCEDURE — 25010000002 METHYLPREDNISOLONE PER 125 MG: Performed by: EMERGENCY MEDICINE

## 2021-07-31 PROCEDURE — 82803 BLOOD GASES ANY COMBINATION: CPT

## 2021-07-31 PROCEDURE — 25010000002 PROPOFOL 10 MG/ML EMULSION

## 2021-07-31 PROCEDURE — 94002 VENT MGMT INPAT INIT DAY: CPT

## 2021-07-31 PROCEDURE — 71275 CT ANGIOGRAPHY CHEST: CPT

## 2021-07-31 PROCEDURE — 25010000002 SUCCINYLCHOLINE PER 20 MG

## 2021-07-31 PROCEDURE — 85730 THROMBOPLASTIN TIME PARTIAL: CPT | Performed by: EMERGENCY MEDICINE

## 2021-07-31 PROCEDURE — 80179 DRUG ASSAY SALICYLATE: CPT | Performed by: EMERGENCY MEDICINE

## 2021-07-31 PROCEDURE — 25010000002 ONDANSETRON PER 1 MG

## 2021-07-31 PROCEDURE — 0BH17EZ INSERTION OF ENDOTRACHEAL AIRWAY INTO TRACHEA, VIA NATURAL OR ARTIFICIAL OPENING: ICD-10-PCS | Performed by: EMERGENCY MEDICINE

## 2021-07-31 PROCEDURE — 83605 ASSAY OF LACTIC ACID: CPT

## 2021-07-31 PROCEDURE — 94799 UNLISTED PULMONARY SVC/PX: CPT

## 2021-07-31 PROCEDURE — 80053 COMPREHEN METABOLIC PANEL: CPT | Performed by: EMERGENCY MEDICINE

## 2021-07-31 PROCEDURE — 99291 CRITICAL CARE FIRST HOUR: CPT

## 2021-07-31 PROCEDURE — 82077 ASSAY SPEC XCP UR&BREATH IA: CPT | Performed by: EMERGENCY MEDICINE

## 2021-07-31 PROCEDURE — 93005 ELECTROCARDIOGRAM TRACING: CPT | Performed by: EMERGENCY MEDICINE

## 2021-07-31 PROCEDURE — 82330 ASSAY OF CALCIUM: CPT

## 2021-07-31 PROCEDURE — 85018 HEMOGLOBIN: CPT

## 2021-07-31 PROCEDURE — 70450 CT HEAD/BRAIN W/O DYE: CPT

## 2021-07-31 PROCEDURE — 80051 ELECTROLYTE PANEL: CPT

## 2021-07-31 PROCEDURE — 5A1935Z RESPIRATORY VENTILATION, LESS THAN 24 CONSECUTIVE HOURS: ICD-10-PCS | Performed by: EMERGENCY MEDICINE

## 2021-07-31 PROCEDURE — 25010000002 LORAZEPAM PER 2 MG

## 2021-07-31 PROCEDURE — 31500 INSERT EMERGENCY AIRWAY: CPT

## 2021-07-31 PROCEDURE — U0003 INFECTIOUS AGENT DETECTION BY NUCLEIC ACID (DNA OR RNA); SEVERE ACUTE RESPIRATORY SYNDROME CORONAVIRUS 2 (SARS-COV-2) (CORONAVIRUS DISEASE [COVID-19]), AMPLIFIED PROBE TECHNIQUE, MAKING USE OF HIGH THROUGHPUT TECHNOLOGIES AS DESCRIBED BY CMS-2020-01-R: HCPCS | Performed by: EMERGENCY MEDICINE

## 2021-07-31 PROCEDURE — 85025 COMPLETE CBC W/AUTO DIFF WBC: CPT | Performed by: EMERGENCY MEDICINE

## 2021-07-31 PROCEDURE — 25010000002 CEFEPIME PER 500 MG: Performed by: EMERGENCY MEDICINE

## 2021-07-31 PROCEDURE — 83735 ASSAY OF MAGNESIUM: CPT | Performed by: EMERGENCY MEDICINE

## 2021-07-31 PROCEDURE — 87040 BLOOD CULTURE FOR BACTERIA: CPT | Performed by: EMERGENCY MEDICINE

## 2021-07-31 RX ORDER — SUCCINYLCHOLINE CHLORIDE 20 MG/ML
INJECTION INTRAMUSCULAR; INTRAVENOUS
Status: COMPLETED
Start: 2021-07-31 | End: 2021-07-31

## 2021-07-31 RX ORDER — VENLAFAXINE HYDROCHLORIDE 225 MG/1
225 TABLET, EXTENDED RELEASE ORAL
COMMUNITY
End: 2022-02-07

## 2021-07-31 RX ORDER — SODIUM CHLORIDE 0.9 % (FLUSH) 0.9 %
10 SYRINGE (ML) INJECTION EVERY 12 HOURS SCHEDULED
Status: DISCONTINUED | OUTPATIENT
Start: 2021-07-31 | End: 2021-08-04 | Stop reason: HOSPADM

## 2021-07-31 RX ORDER — SODIUM CHLORIDE 0.9 % (FLUSH) 0.9 %
10 SYRINGE (ML) INJECTION AS NEEDED
Status: DISCONTINUED | OUTPATIENT
Start: 2021-07-31 | End: 2021-08-04 | Stop reason: HOSPADM

## 2021-07-31 RX ORDER — VECURONIUM BROMIDE 1 MG/ML
INJECTION, POWDER, LYOPHILIZED, FOR SOLUTION INTRAVENOUS
Status: COMPLETED
Start: 2021-07-31 | End: 2021-07-31

## 2021-07-31 RX ORDER — ACETAMINOPHEN 650 MG/1
650 SUPPOSITORY RECTAL EVERY 4 HOURS PRN
Status: DISCONTINUED | OUTPATIENT
Start: 2021-07-31 | End: 2021-08-04 | Stop reason: HOSPADM

## 2021-07-31 RX ORDER — ONDANSETRON 2 MG/ML
4 INJECTION INTRAMUSCULAR; INTRAVENOUS EVERY 6 HOURS PRN
Status: DISCONTINUED | OUTPATIENT
Start: 2021-07-31 | End: 2021-08-04 | Stop reason: HOSPADM

## 2021-07-31 RX ORDER — PROPOFOL 10 MG/ML
VIAL (ML) INTRAVENOUS
Status: COMPLETED
Start: 2021-07-31 | End: 2021-07-31

## 2021-07-31 RX ORDER — LORAZEPAM 2 MG/ML
1 INJECTION INTRAMUSCULAR ONCE
Status: COMPLETED | OUTPATIENT
Start: 2021-07-31 | End: 2021-07-31

## 2021-07-31 RX ORDER — METHYLPREDNISOLONE SODIUM SUCCINATE 125 MG/2ML
125 INJECTION, POWDER, LYOPHILIZED, FOR SOLUTION INTRAMUSCULAR; INTRAVENOUS ONCE
Status: COMPLETED | OUTPATIENT
Start: 2021-07-31 | End: 2021-07-31

## 2021-07-31 RX ORDER — NITROGLYCERIN 0.4 MG/1
0.4 TABLET SUBLINGUAL
Status: DISCONTINUED | OUTPATIENT
Start: 2021-07-31 | End: 2021-08-04 | Stop reason: HOSPADM

## 2021-07-31 RX ORDER — ACETAMINOPHEN 325 MG/1
650 TABLET ORAL EVERY 4 HOURS PRN
Status: DISCONTINUED | OUTPATIENT
Start: 2021-07-31 | End: 2021-08-04 | Stop reason: HOSPADM

## 2021-07-31 RX ORDER — FENTANYL CITRATE-0.9 % NACL/PF 10 MCG/ML
100 PLASTIC BAG, INJECTION (ML) INTRAVENOUS
Status: DISCONTINUED | OUTPATIENT
Start: 2021-08-01 | End: 2021-08-01

## 2021-07-31 RX ORDER — PROPOFOL 10 MG/ML
VIAL (ML) INTRAVENOUS
Status: DISCONTINUED
Start: 2021-07-31 | End: 2021-07-31 | Stop reason: WASHOUT

## 2021-07-31 RX ORDER — SUCRALFATE 1 G/1
1 TABLET ORAL 3 TIMES DAILY
Status: ON HOLD | COMMUNITY
End: 2021-08-02

## 2021-07-31 RX ORDER — WATER 1000 ML/1000ML
10 INJECTION, SOLUTION INTRAVENOUS ONCE
Status: COMPLETED | OUTPATIENT
Start: 2021-07-31 | End: 2021-07-31

## 2021-07-31 RX ORDER — WATER 1000 ML/1000ML
INJECTION, SOLUTION INTRAVENOUS
Status: COMPLETED
Start: 2021-07-31 | End: 2021-07-31

## 2021-07-31 RX ORDER — CHLORHEXIDINE GLUCONATE 0.12 MG/ML
15 RINSE ORAL EVERY 12 HOURS SCHEDULED
Status: DISCONTINUED | OUTPATIENT
Start: 2021-08-01 | End: 2021-08-01

## 2021-07-31 RX ORDER — SUCCINYLCHOLINE CHLORIDE 20 MG/ML
100 INJECTION INTRAMUSCULAR; INTRAVENOUS ONCE
Status: COMPLETED | OUTPATIENT
Start: 2021-07-31 | End: 2021-07-31

## 2021-07-31 RX ORDER — VECURONIUM BROMIDE 1 MG/ML
10 INJECTION, POWDER, LYOPHILIZED, FOR SOLUTION INTRAVENOUS ONCE
Status: COMPLETED | OUTPATIENT
Start: 2021-07-31 | End: 2021-07-31

## 2021-07-31 RX ORDER — ONDANSETRON 2 MG/ML
INJECTION INTRAMUSCULAR; INTRAVENOUS
Status: COMPLETED
Start: 2021-07-31 | End: 2021-07-31

## 2021-07-31 RX ORDER — LORAZEPAM 2 MG/ML
INJECTION INTRAMUSCULAR
Status: COMPLETED
Start: 2021-07-31 | End: 2021-07-31

## 2021-07-31 RX ORDER — ONDANSETRON 4 MG/1
4 TABLET, FILM COATED ORAL EVERY 6 HOURS PRN
Status: DISCONTINUED | OUTPATIENT
Start: 2021-07-31 | End: 2021-08-04 | Stop reason: HOSPADM

## 2021-07-31 RX ADMIN — LORAZEPAM 1 MG: 2 INJECTION INTRAMUSCULAR; INTRAVENOUS at 21:25

## 2021-07-31 RX ADMIN — VECURONIUM BROMIDE 10 MG: 1 INJECTION, POWDER, LYOPHILIZED, FOR SOLUTION INTRAVENOUS at 22:05

## 2021-07-31 RX ADMIN — ONDANSETRON 4 MG: 2 INJECTION INTRAMUSCULAR; INTRAVENOUS at 23:36

## 2021-07-31 RX ADMIN — PROPOFOL 5 MCG/KG/MIN: 10 INJECTION, EMULSION INTRAVENOUS at 21:39

## 2021-07-31 RX ADMIN — SODIUM CHLORIDE 2271 ML: 9 INJECTION, SOLUTION INTRAVENOUS at 21:34

## 2021-07-31 RX ADMIN — IOPAMIDOL 100 ML: 755 INJECTION, SOLUTION INTRAVENOUS at 22:24

## 2021-07-31 RX ADMIN — WATER 10 ML: 1000 INJECTION, SOLUTION INTRAVENOUS at 22:05

## 2021-07-31 RX ADMIN — SUCCINYLCHOLINE CHLORIDE 100 MG: 20 INJECTION INTRAMUSCULAR; INTRAVENOUS at 21:55

## 2021-07-31 RX ADMIN — WATER 10 ML: 1 INJECTION INTRAMUSCULAR; INTRAVENOUS; SUBCUTANEOUS at 22:05

## 2021-07-31 RX ADMIN — FAMOTIDINE 20 MG: 10 INJECTION INTRAVENOUS at 23:37

## 2021-07-31 RX ADMIN — CEFEPIME 2 G: 2 INJECTION, POWDER, FOR SOLUTION INTRAVENOUS at 23:18

## 2021-07-31 RX ADMIN — LORAZEPAM 1 MG: 2 INJECTION INTRAMUSCULAR at 21:25

## 2021-07-31 RX ADMIN — METHYLPREDNISOLONE SODIUM SUCCINATE 125 MG: 125 INJECTION, POWDER, FOR SOLUTION INTRAMUSCULAR; INTRAVENOUS at 23:18

## 2021-07-31 RX ADMIN — SODIUM CHLORIDE 1000 ML: 9 INJECTION, SOLUTION INTRAVENOUS at 21:33

## 2021-08-01 ENCOUNTER — APPOINTMENT (OUTPATIENT)
Dept: ULTRASOUND IMAGING | Facility: HOSPITAL | Age: 41
End: 2021-08-01

## 2021-08-01 ENCOUNTER — APPOINTMENT (OUTPATIENT)
Dept: GENERAL RADIOLOGY | Facility: HOSPITAL | Age: 41
End: 2021-08-01

## 2021-08-01 PROBLEM — J96.00 ACUTE RESPIRATORY FAILURE (HCC): Status: ACTIVE | Noted: 2021-08-01

## 2021-08-01 PROBLEM — T78.00XA ANAPHYLAXIS DUE TO FOOD: Status: ACTIVE | Noted: 2021-08-01

## 2021-08-01 PROBLEM — A41.9 SEPSIS WITHOUT SEPTIC SHOCK: Status: ACTIVE | Noted: 2021-08-01

## 2021-08-01 PROBLEM — F41.8 MIXED ANXIETY DEPRESSIVE DISORDER: Status: ACTIVE | Noted: 2021-08-01

## 2021-08-01 PROBLEM — J69.0 ASPIRATION PNEUMONIA (HCC): Status: ACTIVE | Noted: 2021-08-01

## 2021-08-01 PROBLEM — R09.2 RESPIRATORY ARREST: Status: ACTIVE | Noted: 2021-08-01

## 2021-08-01 LAB
ALBUMIN SERPL-MCNC: 4.3 G/DL (ref 3.5–5.2)
ALBUMIN/GLOB SERPL: 2.4 G/DL
ALP SERPL-CCNC: 51 U/L (ref 39–117)
ALT SERPL W P-5'-P-CCNC: 21 U/L (ref 1–41)
ANION GAP SERPL CALCULATED.3IONS-SCNC: 11 MMOL/L (ref 5–15)
ARTERIAL PATENCY WRIST A: POSITIVE
AST SERPL-CCNC: 20 U/L (ref 1–40)
ATMOSPHERIC PRESS: ABNORMAL MM[HG]
BASE EXCESS BLDA CALC-SCNC: -1.9 MMOL/L (ref 0–3)
BASOPHILS # BLD AUTO: 0 10*3/MM3 (ref 0–0.2)
BASOPHILS NFR BLD AUTO: 0.4 % (ref 0–1.5)
BDY SITE: ABNORMAL
BILIRUB SERPL-MCNC: 0.2 MG/DL (ref 0–1.2)
BUN SERPL-MCNC: 12 MG/DL (ref 6–20)
BUN/CREAT SERPL: 12.1 (ref 7–25)
CALCIUM SPEC-SCNC: 8.3 MG/DL (ref 8.6–10.5)
CHLORIDE SERPL-SCNC: 105 MMOL/L (ref 98–107)
CO2 BLDA-SCNC: 27.5 MMOL/L (ref 22–29)
CO2 SERPL-SCNC: 23 MMOL/L (ref 22–29)
CREAT SERPL-MCNC: 0.99 MG/DL (ref 0.76–1.27)
CRP SERPL-MCNC: <0.3 MG/DL (ref 0–0.5)
D-LACTATE SERPL-SCNC: 1.4 MMOL/L (ref 0.5–2)
D-LACTATE SERPL-SCNC: 1.6 MMOL/L (ref 0.5–2)
D-LACTATE SERPL-SCNC: 2 MMOL/L (ref 0.5–2)
D-LACTATE SERPL-SCNC: 2.2 MMOL/L (ref 0.5–2)
DEPRECATED RDW RBC AUTO: 43.8 FL (ref 37–54)
EOSINOPHIL # BLD AUTO: 0 10*3/MM3 (ref 0–0.4)
EOSINOPHIL NFR BLD AUTO: 0 % (ref 0.3–6.2)
ERYTHROCYTE [DISTWIDTH] IN BLOOD BY AUTOMATED COUNT: 13.1 % (ref 12.3–15.4)
GFR SERPL CREATININE-BSD FRML MDRD: 85 ML/MIN/1.73
GLOBULIN UR ELPH-MCNC: 1.8 GM/DL
GLUCOSE BLDC GLUCOMTR-MCNC: 116 MG/DL (ref 70–105)
GLUCOSE BLDC GLUCOMTR-MCNC: 119 MG/DL (ref 70–105)
GLUCOSE BLDC GLUCOMTR-MCNC: 158 MG/DL (ref 70–105)
GLUCOSE SERPL-MCNC: 129 MG/DL (ref 65–99)
HCO3 BLDA-SCNC: 25.8 MMOL/L (ref 21–28)
HCT VFR BLD AUTO: 43.7 % (ref 37.5–51)
HEMODILUTION: NO
HGB BLD-MCNC: 14.7 G/DL (ref 13–17.7)
HOLD SPECIMEN: NORMAL
INHALED O2 CONCENTRATION: 50 %
LYMPHOCYTES # BLD AUTO: 0.4 10*3/MM3 (ref 0.7–3.1)
LYMPHOCYTES NFR BLD AUTO: 4.9 % (ref 19.6–45.3)
MAGNESIUM SERPL-MCNC: 2 MG/DL (ref 1.6–2.6)
MCH RBC QN AUTO: 32.5 PG (ref 26.6–33)
MCHC RBC AUTO-ENTMCNC: 33.7 G/DL (ref 31.5–35.7)
MCV RBC AUTO: 96.4 FL (ref 79–97)
MODALITY: ABNORMAL
MONOCYTES # BLD AUTO: 0.1 10*3/MM3 (ref 0.1–0.9)
MONOCYTES NFR BLD AUTO: 0.7 % (ref 5–12)
NEUTROPHILS NFR BLD AUTO: 7.8 10*3/MM3 (ref 1.7–7)
NEUTROPHILS NFR BLD AUTO: 94 % (ref 42.7–76)
NRBC BLD AUTO-RTO: 0 /100 WBC (ref 0–0.2)
PAW @ PEAK INSP FLOW SETTING VENT: 11 CMH2O
PCO2 BLDA: 54.8 MM HG (ref 35–48)
PEEP RESPIRATORY: 5 CM[H2O]
PH BLDA: 7.28 PH UNITS (ref 7.35–7.45)
PHOSPHATE SERPL-MCNC: 3.5 MG/DL (ref 2.5–4.5)
PLATELET # BLD AUTO: 286 10*3/MM3 (ref 140–450)
PMV BLD AUTO: 8.3 FL (ref 6–12)
PO2 BLDA: 212.1 MM HG (ref 83–108)
POTASSIUM SERPL-SCNC: 4.8 MMOL/L (ref 3.5–5.2)
PROCALCITONIN SERPL-MCNC: <0.02 NG/ML (ref 0–0.25)
PROT SERPL-MCNC: 6.1 G/DL (ref 6–8.5)
RBC # BLD AUTO: 4.53 10*6/MM3 (ref 4.14–5.8)
RESPIRATORY RATE: 18
SAO2 % BLDCOA: 99.6 % (ref 94–98)
SARS-COV-2 RNA PNL SPEC NAA+PROBE: NOT DETECTED
SODIUM SERPL-SCNC: 139 MMOL/L (ref 136–145)
TOTAL RATE: 22 BREATHS/MINUTE
VENTILATOR MODE: ABNORMAL
VT ON VENT VENT: 400 ML
WBC # BLD AUTO: 8.3 10*3/MM3 (ref 3.4–10.8)

## 2021-08-01 PROCEDURE — 25010000002 METHYLPREDNISOLONE PER 40 MG: Performed by: NURSE PRACTITIONER

## 2021-08-01 PROCEDURE — 25010000002 PROPOFOL 10 MG/ML EMULSION: Performed by: NURSE PRACTITIONER

## 2021-08-01 PROCEDURE — 25010000002 METHYLPREDNISOLONE PER 125 MG

## 2021-08-01 PROCEDURE — 94640 AIRWAY INHALATION TREATMENT: CPT

## 2021-08-01 PROCEDURE — 25010000002 FONDAPARINUX PER 0.5 MG: Performed by: INTERNAL MEDICINE

## 2021-08-01 PROCEDURE — 94799 UNLISTED PULMONARY SVC/PX: CPT

## 2021-08-01 PROCEDURE — 71045 X-RAY EXAM CHEST 1 VIEW: CPT

## 2021-08-01 PROCEDURE — 82803 BLOOD GASES ANY COMBINATION: CPT

## 2021-08-01 PROCEDURE — 83605 ASSAY OF LACTIC ACID: CPT | Performed by: NURSE PRACTITIONER

## 2021-08-01 PROCEDURE — 25010000002 FENTANYL CITRATE (PF) 2500 MCG/50ML SOLUTION: Performed by: EMERGENCY MEDICINE

## 2021-08-01 PROCEDURE — 86140 C-REACTIVE PROTEIN: CPT | Performed by: NURSE PRACTITIONER

## 2021-08-01 PROCEDURE — 25010000002 FENTANYL CITRATE (PF) 2500 MCG/50ML SOLUTION: Performed by: NURSE PRACTITIONER

## 2021-08-01 PROCEDURE — 36415 COLL VENOUS BLD VENIPUNCTURE: CPT | Performed by: NURSE PRACTITIONER

## 2021-08-01 PROCEDURE — 25010000002 PROPOFOL 10 MG/ML EMULSION: Performed by: EMERGENCY MEDICINE

## 2021-08-01 PROCEDURE — 76775 US EXAM ABDO BACK WALL LIM: CPT

## 2021-08-01 PROCEDURE — 83605 ASSAY OF LACTIC ACID: CPT

## 2021-08-01 PROCEDURE — 25010000002 DIPHENHYDRAMINE PER 50 MG: Performed by: NURSE PRACTITIONER

## 2021-08-01 PROCEDURE — 0BH17EZ INSERTION OF ENDOTRACHEAL AIRWAY INTO TRACHEA, VIA NATURAL OR ARTIFICIAL OPENING: ICD-10-PCS | Performed by: INTERNAL MEDICINE

## 2021-08-01 PROCEDURE — 85025 COMPLETE CBC W/AUTO DIFF WBC: CPT | Performed by: NURSE PRACTITIONER

## 2021-08-01 PROCEDURE — 74018 RADEX ABDOMEN 1 VIEW: CPT

## 2021-08-01 PROCEDURE — 36600 WITHDRAWAL OF ARTERIAL BLOOD: CPT

## 2021-08-01 PROCEDURE — 84100 ASSAY OF PHOSPHORUS: CPT | Performed by: NURSE PRACTITIONER

## 2021-08-01 PROCEDURE — 83735 ASSAY OF MAGNESIUM: CPT | Performed by: NURSE PRACTITIONER

## 2021-08-01 PROCEDURE — 25010000002 MIDAZOLAM 50 MG/10ML SOLUTION: Performed by: NURSE PRACTITIONER

## 2021-08-01 PROCEDURE — 25010000002 MIDAZOLAM PER 1 MG

## 2021-08-01 PROCEDURE — 25010000002 FENTANYL CITRATE (PF) 50 MCG/ML SOLUTION

## 2021-08-01 PROCEDURE — 25010000002 LORAZEPAM PER 2 MG

## 2021-08-01 PROCEDURE — 84145 PROCALCITONIN (PCT): CPT | Performed by: NURSE PRACTITIONER

## 2021-08-01 PROCEDURE — 80053 COMPREHEN METABOLIC PANEL: CPT | Performed by: NURSE PRACTITIONER

## 2021-08-01 PROCEDURE — 94002 VENT MGMT INPAT INIT DAY: CPT

## 2021-08-01 PROCEDURE — 82962 GLUCOSE BLOOD TEST: CPT

## 2021-08-01 PROCEDURE — 5A1935Z RESPIRATORY VENTILATION, LESS THAN 24 CONSECUTIVE HOURS: ICD-10-PCS | Performed by: INTERNAL MEDICINE

## 2021-08-01 PROCEDURE — 25010000002 ONDANSETRON PER 1 MG: Performed by: NURSE PRACTITIONER

## 2021-08-01 PROCEDURE — 25010000002 PROPOFOL 10 MG/ML EMULSION

## 2021-08-01 RX ORDER — MIDAZOLAM HYDROCHLORIDE 1 MG/ML
2 INJECTION INTRAMUSCULAR; INTRAVENOUS ONCE
Status: COMPLETED | OUTPATIENT
Start: 2021-08-01 | End: 2021-08-01

## 2021-08-01 RX ORDER — FENTANYL CITRATE 50 UG/ML
INJECTION, SOLUTION INTRAMUSCULAR; INTRAVENOUS
Status: COMPLETED
Start: 2021-08-01 | End: 2021-08-01

## 2021-08-01 RX ORDER — PROPOFOL 10 MG/ML
VIAL (ML) INTRAVENOUS
Status: COMPLETED
Start: 2021-08-01 | End: 2021-08-01

## 2021-08-01 RX ORDER — IPRATROPIUM BROMIDE AND ALBUTEROL SULFATE 2.5; .5 MG/3ML; MG/3ML
3 SOLUTION RESPIRATORY (INHALATION)
Status: DISCONTINUED | OUTPATIENT
Start: 2021-08-01 | End: 2021-08-03

## 2021-08-01 RX ORDER — MIDAZOLAM HYDROCHLORIDE 1 MG/ML
2 INJECTION INTRAMUSCULAR; INTRAVENOUS ONCE
Status: CANCELLED | OUTPATIENT
Start: 2021-08-01 | End: 2021-08-01

## 2021-08-01 RX ORDER — ETOMIDATE 2 MG/ML
30 INJECTION INTRAVENOUS ONCE
Status: CANCELLED | OUTPATIENT
Start: 2021-08-01 | End: 2021-08-01

## 2021-08-01 RX ORDER — ETOMIDATE 2 MG/ML
INJECTION INTRAVENOUS
Status: COMPLETED
Start: 2021-08-01 | End: 2021-08-01

## 2021-08-01 RX ORDER — RISPERIDONE 0.25 MG/1
0.25 TABLET ORAL EVERY 12 HOURS SCHEDULED
Status: DISCONTINUED | OUTPATIENT
Start: 2021-08-01 | End: 2021-08-03

## 2021-08-01 RX ORDER — METHYLPREDNISOLONE SODIUM SUCCINATE 40 MG/ML
40 INJECTION, POWDER, LYOPHILIZED, FOR SOLUTION INTRAMUSCULAR; INTRAVENOUS EVERY 6 HOURS
Status: DISCONTINUED | OUTPATIENT
Start: 2021-08-01 | End: 2021-08-02

## 2021-08-01 RX ORDER — SODIUM CHLORIDE 9 MG/ML
50 INJECTION, SOLUTION INTRAVENOUS CONTINUOUS
Status: DISCONTINUED | OUTPATIENT
Start: 2021-08-01 | End: 2021-08-03

## 2021-08-01 RX ORDER — LORAZEPAM 2 MG/ML
INJECTION INTRAMUSCULAR
Status: COMPLETED
Start: 2021-08-01 | End: 2021-08-01

## 2021-08-01 RX ORDER — MIDAZOLAM HYDROCHLORIDE 1 MG/ML
INJECTION INTRAMUSCULAR; INTRAVENOUS
Status: COMPLETED
Start: 2021-08-01 | End: 2021-08-01

## 2021-08-01 RX ORDER — DEXMEDETOMIDINE HYDROCHLORIDE 4 UG/ML
.2-1.5 INJECTION, SOLUTION INTRAVENOUS
Status: DISCONTINUED | OUTPATIENT
Start: 2021-08-01 | End: 2021-08-03

## 2021-08-01 RX ORDER — FENTANYL CITRATE-0.9 % NACL/PF 10 MCG/ML
50-300 PLASTIC BAG, INJECTION (ML) INTRAVENOUS
Status: DISCONTINUED | OUTPATIENT
Start: 2021-08-01 | End: 2021-08-02

## 2021-08-01 RX ORDER — FENTANYL CITRATE 50 UG/ML
100 INJECTION, SOLUTION INTRAMUSCULAR; INTRAVENOUS ONCE
Status: CANCELLED | OUTPATIENT
Start: 2021-08-01

## 2021-08-01 RX ORDER — NICOTINE POLACRILEX 4 MG
15 LOZENGE BUCCAL
Status: DISCONTINUED | OUTPATIENT
Start: 2021-08-01 | End: 2021-08-04 | Stop reason: HOSPADM

## 2021-08-01 RX ORDER — WATER 1000 ML/1000ML
INJECTION, SOLUTION INTRAVENOUS
Status: DISPENSED
Start: 2021-08-01 | End: 2021-08-02

## 2021-08-01 RX ORDER — DEXTROSE MONOHYDRATE 25 G/50ML
25 INJECTION, SOLUTION INTRAVENOUS
Status: DISCONTINUED | OUTPATIENT
Start: 2021-08-01 | End: 2021-08-04 | Stop reason: HOSPADM

## 2021-08-01 RX ORDER — QUETIAPINE FUMARATE 25 MG/1
50 TABLET, FILM COATED ORAL NIGHTLY
Status: DISCONTINUED | OUTPATIENT
Start: 2021-08-01 | End: 2021-08-01

## 2021-08-01 RX ORDER — VECURONIUM BROMIDE 1 MG/ML
INJECTION, POWDER, LYOPHILIZED, FOR SOLUTION INTRAVENOUS
Status: COMPLETED
Start: 2021-08-01 | End: 2021-08-01

## 2021-08-01 RX ORDER — INSULIN LISPRO 100 [IU]/ML
0-7 INJECTION, SOLUTION INTRAVENOUS; SUBCUTANEOUS AS NEEDED
Status: DISCONTINUED | OUTPATIENT
Start: 2021-08-01 | End: 2021-08-03

## 2021-08-01 RX ORDER — METHYLPREDNISOLONE SODIUM SUCCINATE 125 MG/2ML
125 INJECTION, POWDER, LYOPHILIZED, FOR SOLUTION INTRAMUSCULAR; INTRAVENOUS ONCE
Status: COMPLETED | OUTPATIENT
Start: 2021-08-01 | End: 2021-08-01

## 2021-08-01 RX ORDER — METHYLPREDNISOLONE SODIUM SUCCINATE 40 MG/ML
20 INJECTION, POWDER, LYOPHILIZED, FOR SOLUTION INTRAMUSCULAR; INTRAVENOUS EVERY 6 HOURS
Status: DISCONTINUED | OUTPATIENT
Start: 2021-08-01 | End: 2021-08-01

## 2021-08-01 RX ORDER — MIDAZOLAM HYDROCHLORIDE 1 MG/ML
1-10 INJECTION, SOLUTION INTRAVENOUS
Status: DISCONTINUED | OUTPATIENT
Start: 2021-08-01 | End: 2021-08-02

## 2021-08-01 RX ORDER — QUETIAPINE FUMARATE 25 MG/1
50 TABLET, FILM COATED ORAL NIGHTLY
Status: DISCONTINUED | OUTPATIENT
Start: 2021-08-01 | End: 2021-08-02

## 2021-08-01 RX ORDER — DOXYCYCLINE 100 MG/1
100 TABLET ORAL EVERY 12 HOURS SCHEDULED
Status: DISCONTINUED | OUTPATIENT
Start: 2021-08-01 | End: 2021-08-04 | Stop reason: HOSPADM

## 2021-08-01 RX ORDER — BUDESONIDE 0.5 MG/2ML
0.5 INHALANT ORAL
Status: DISCONTINUED | OUTPATIENT
Start: 2021-08-01 | End: 2021-08-03

## 2021-08-01 RX ORDER — DIPHENHYDRAMINE HYDROCHLORIDE 50 MG/ML
25 INJECTION INTRAMUSCULAR; INTRAVENOUS EVERY 6 HOURS
Status: DISCONTINUED | OUTPATIENT
Start: 2021-08-01 | End: 2021-08-03

## 2021-08-01 RX ORDER — VECURONIUM BROMIDE 1 MG/ML
10 INJECTION, POWDER, LYOPHILIZED, FOR SOLUTION INTRAVENOUS
Status: DISCONTINUED | OUTPATIENT
Start: 2021-08-01 | End: 2021-08-03

## 2021-08-01 RX ORDER — LORAZEPAM 2 MG/ML
1 INJECTION INTRAMUSCULAR ONCE
Status: COMPLETED | OUTPATIENT
Start: 2021-08-01 | End: 2021-08-01

## 2021-08-01 RX ORDER — DEXMEDETOMIDINE HYDROCHLORIDE 4 UG/ML
.2-1.5 INJECTION, SOLUTION INTRAVENOUS
Status: DISCONTINUED | OUTPATIENT
Start: 2021-08-01 | End: 2021-08-01

## 2021-08-01 RX ORDER — IPRATROPIUM BROMIDE AND ALBUTEROL SULFATE 2.5; .5 MG/3ML; MG/3ML
3 SOLUTION RESPIRATORY (INHALATION)
Status: DISCONTINUED | OUTPATIENT
Start: 2021-08-01 | End: 2021-08-01

## 2021-08-01 RX ORDER — INSULIN LISPRO 100 [IU]/ML
0-7 INJECTION, SOLUTION INTRAVENOUS; SUBCUTANEOUS EVERY 6 HOURS SCHEDULED
Status: DISCONTINUED | OUTPATIENT
Start: 2021-08-01 | End: 2021-08-03

## 2021-08-01 RX ORDER — METHYLPREDNISOLONE SODIUM SUCCINATE 125 MG/2ML
INJECTION, POWDER, LYOPHILIZED, FOR SOLUTION INTRAMUSCULAR; INTRAVENOUS
Status: COMPLETED
Start: 2021-08-01 | End: 2021-08-01

## 2021-08-01 RX ORDER — WATER 1000 ML/1000ML
INJECTION, SOLUTION INTRAVENOUS
Status: COMPLETED
Start: 2021-08-01 | End: 2021-08-01

## 2021-08-01 RX ORDER — FONDAPARINUX SODIUM 2.5 MG/.5ML
2.5 INJECTION SUBCUTANEOUS
Status: DISCONTINUED | OUTPATIENT
Start: 2021-08-01 | End: 2021-08-04 | Stop reason: HOSPADM

## 2021-08-01 RX ADMIN — MIDAZOLAM 2 MG: 1 INJECTION INTRAMUSCULAR; INTRAVENOUS at 14:14

## 2021-08-01 RX ADMIN — MIDAZOLAM 2 MG: 1 INJECTION INTRAMUSCULAR; INTRAVENOUS at 00:56

## 2021-08-01 RX ADMIN — FENTANYL CITRATE 260 MCG/HR: 50 INJECTION, SOLUTION INTRAMUSCULAR; INTRAVENOUS at 22:33

## 2021-08-01 RX ADMIN — DEXMEDETOMIDINE HYDROCHLORIDE 1 MCG/KG/HR: 100 INJECTION, SOLUTION INTRAVENOUS at 07:28

## 2021-08-01 RX ADMIN — WATER 10 ML: 1 INJECTION INTRAMUSCULAR; INTRAVENOUS; SUBCUTANEOUS at 14:18

## 2021-08-01 RX ADMIN — DEXMEDETOMIDINE HYDROCHLORIDE 0.5 MCG/KG/HR: 100 INJECTION, SOLUTION INTRAVENOUS at 21:39

## 2021-08-01 RX ADMIN — MIDAZOLAM HYDROCHLORIDE 2 MG: 1 INJECTION INTRAMUSCULAR; INTRAVENOUS at 05:08

## 2021-08-01 RX ADMIN — Medication 10 ML: at 09:21

## 2021-08-01 RX ADMIN — FAMOTIDINE 20 MG: 10 INJECTION INTRAVENOUS at 09:21

## 2021-08-01 RX ADMIN — DIPHENHYDRAMINE HYDROCHLORIDE 25 MG: 50 INJECTION, SOLUTION INTRAMUSCULAR; INTRAVENOUS at 07:26

## 2021-08-01 RX ADMIN — DOXYCYCLINE 100 MG: 100 TABLET, FILM COATED ORAL at 21:14

## 2021-08-01 RX ADMIN — IPRATROPIUM BROMIDE AND ALBUTEROL SULFATE 3 ML: 2.5; .5 SOLUTION RESPIRATORY (INHALATION) at 06:57

## 2021-08-01 RX ADMIN — FONDAPARINUX SODIUM 2.5 MG: 2.5 INJECTION SUBCUTANEOUS at 17:01

## 2021-08-01 RX ADMIN — FENTANYL CITRATE 200 MCG/HR: 50 INJECTION, SOLUTION INTRAMUSCULAR; INTRAVENOUS at 14:17

## 2021-08-01 RX ADMIN — MIDAZOLAM 2 MG/HR: 5 INJECTION INTRAMUSCULAR; INTRAVENOUS at 14:17

## 2021-08-01 RX ADMIN — METHYLPREDNISOLONE SODIUM SUCCINATE 125 MG: 125 INJECTION, POWDER, FOR SOLUTION INTRAMUSCULAR; INTRAVENOUS at 14:15

## 2021-08-01 RX ADMIN — METHYLPREDNISOLONE SODIUM SUCCINATE 40 MG: 40 INJECTION, POWDER, FOR SOLUTION INTRAMUSCULAR; INTRAVENOUS at 21:13

## 2021-08-01 RX ADMIN — Medication 10 ML: at 21:16

## 2021-08-01 RX ADMIN — MIDAZOLAM HYDROCHLORIDE 2 MG: 1 INJECTION INTRAMUSCULAR; INTRAVENOUS at 14:13

## 2021-08-01 RX ADMIN — MIDAZOLAM HYDROCHLORIDE 2 MG: 1 INJECTION INTRAMUSCULAR; INTRAVENOUS at 00:56

## 2021-08-01 RX ADMIN — ONDANSETRON 4 MG: 2 INJECTION INTRAMUSCULAR; INTRAVENOUS at 13:24

## 2021-08-01 RX ADMIN — PROPOFOL 50 MCG/KG/MIN: 10 INJECTION, EMULSION INTRAVENOUS at 21:13

## 2021-08-01 RX ADMIN — DIPHENHYDRAMINE HYDROCHLORIDE 25 MG: 50 INJECTION, SOLUTION INTRAMUSCULAR; INTRAVENOUS at 21:13

## 2021-08-01 RX ADMIN — PROPOFOL 1000 MG: 10 INJECTION, EMULSION INTRAVENOUS at 17:00

## 2021-08-01 RX ADMIN — VECURONIUM BROMIDE 10 MG: 1 INJECTION, POWDER, LYOPHILIZED, FOR SOLUTION INTRAVENOUS at 14:16

## 2021-08-01 RX ADMIN — MIDAZOLAM 2 MG: 1 INJECTION INTRAMUSCULAR; INTRAVENOUS at 04:43

## 2021-08-01 RX ADMIN — METHYLPREDNISOLONE SODIUM SUCCINATE 20 MG: 40 INJECTION, POWDER, FOR SOLUTION INTRAMUSCULAR; INTRAVENOUS at 07:27

## 2021-08-01 RX ADMIN — METHYLPREDNISOLONE SODIUM SUCCINATE 20 MG: 40 INJECTION, POWDER, FOR SOLUTION INTRAMUSCULAR; INTRAVENOUS at 13:25

## 2021-08-01 RX ADMIN — DIPHENHYDRAMINE HYDROCHLORIDE 25 MG: 50 INJECTION, SOLUTION INTRAMUSCULAR; INTRAVENOUS at 13:25

## 2021-08-01 RX ADMIN — MIDAZOLAM HYDROCHLORIDE 2 MG: 1 INJECTION INTRAMUSCULAR; INTRAVENOUS at 04:43

## 2021-08-01 RX ADMIN — MIDAZOLAM 7 MG/HR: 5 INJECTION INTRAMUSCULAR; INTRAVENOUS at 19:47

## 2021-08-01 RX ADMIN — VECURONIUM BROMIDE 10 MG: 1 INJECTION, POWDER, LYOPHILIZED, FOR SOLUTION INTRAVENOUS at 20:48

## 2021-08-01 RX ADMIN — IPRATROPIUM BROMIDE AND ALBUTEROL SULFATE 3 ML: 2.5; .5 SOLUTION RESPIRATORY (INHALATION) at 19:42

## 2021-08-01 RX ADMIN — QUETIAPINE FUMARATE 50 MG: 25 TABLET ORAL at 21:14

## 2021-08-01 RX ADMIN — MIDAZOLAM 2 MG: 1 INJECTION INTRAMUSCULAR; INTRAVENOUS at 05:08

## 2021-08-01 RX ADMIN — FAMOTIDINE 20 MG: 10 INJECTION INTRAVENOUS at 21:13

## 2021-08-01 RX ADMIN — ETOMIDATE 40 MG: 40 INJECTION, SOLUTION INTRAVENOUS at 14:13

## 2021-08-01 RX ADMIN — BUDESONIDE 0.5 MG: 0.5 SUSPENSION RESPIRATORY (INHALATION) at 19:42

## 2021-08-01 RX ADMIN — RISPERIDONE 0.25 MG: 0.25 TABLET ORAL at 21:14

## 2021-08-01 RX ADMIN — FENTANYL CITRATE 250 MCG/HR: 50 INJECTION, SOLUTION INTRAMUSCULAR; INTRAVENOUS at 05:34

## 2021-08-01 RX ADMIN — MIDAZOLAM 2 MG: 1 INJECTION INTRAMUSCULAR; INTRAVENOUS at 14:13

## 2021-08-01 RX ADMIN — FENTANYL CITRATE 100 MCG/HR: 50 INJECTION, SOLUTION INTRAMUSCULAR; INTRAVENOUS at 00:39

## 2021-08-01 RX ADMIN — BUDESONIDE 0.5 MG: 0.5 SUSPENSION RESPIRATORY (INHALATION) at 06:57

## 2021-08-01 RX ADMIN — FENTANYL CITRATE 275 MCG/HR: 50 INJECTION, SOLUTION INTRAMUSCULAR; INTRAVENOUS at 18:20

## 2021-08-01 RX ADMIN — PROPOFOL 1000 MG: 10 INJECTION, EMULSION INTRAVENOUS at 14:18

## 2021-08-01 RX ADMIN — METHYLPREDNISOLONE SODIUM SUCCINATE 125 MG: 125 INJECTION, POWDER, LYOPHILIZED, FOR SOLUTION INTRAMUSCULAR; INTRAVENOUS at 14:15

## 2021-08-01 RX ADMIN — FENTANYL CITRATE 100 MCG: 50 INJECTION, SOLUTION INTRAMUSCULAR; INTRAVENOUS at 14:15

## 2021-08-01 RX ADMIN — LORAZEPAM 1 MG: 2 INJECTION INTRAMUSCULAR; INTRAVENOUS at 07:27

## 2021-08-01 RX ADMIN — IPRATROPIUM BROMIDE AND ALBUTEROL SULFATE 3 ML: 2.5; .5 SOLUTION RESPIRATORY (INHALATION) at 23:35

## 2021-08-01 RX ADMIN — PROPOFOL 50 MCG/KG/MIN: 10 INJECTION, EMULSION INTRAVENOUS at 00:56

## 2021-08-01 RX ADMIN — PROPOFOL 50 MCG/KG/MIN: 10 INJECTION, EMULSION INTRAVENOUS at 05:09

## 2021-08-01 RX ADMIN — SODIUM CHLORIDE 50 ML/HR: 9 INJECTION, SOLUTION INTRAVENOUS at 17:01

## 2021-08-01 RX ADMIN — LORAZEPAM 1 MG: 2 INJECTION INTRAMUSCULAR at 07:27

## 2021-08-01 NOTE — NURSING NOTE
During shift report patient became very agitated and unable to calm. NP was notified and came to the bedside to assess patient and his need for more sedation versus possible extubation. Meds were given and the patient was educated about the situation and how we were going to help him. Patient was successfully extubated and was reoriented to situation.

## 2021-08-01 NOTE — PROGRESS NOTES
PULMONARY/CRITICAL CARE PROGRESS NOTE       NAME:     Warren Giraldo   AGE:     40 y.o.   SEX:  male   : 1980       MRN:       [id number]          RM: [facility]      ASSESSMENT & PLAN     F/U: Acute respiratory failure    Active Problems:    Anxiety    Depressive disorder    Gastroesophageal reflux disease with esophagitis    PUD (peptic ulcer disease)    Acute respiratory failure requiring reintubation (CMS/[facility])    Mixed anxiety depressive disorder    Anaphylaxis due to food    Respiratory arrest (CMS/[facility])    Aspiration pneumonia (CMS/[facility])    Sepsis without septic shock (CMS/[facility])       Multidisciplinary Rounds:  -Events noted  -intubated, awake and agitated  -sedation discontinued, +leak test  -proceed with extubation    Assessment and plan  Acute respiratory failure requiring intubation, ?2/2 anaphylaxis from food allergy  Witnessed respiratory arrest at home  Possible aspiration pneumonia  ?Cardiac arrest  -Events at home noted, unclear if patient had cardiac arrest  -Patient intubated for airway protection  -Vent settings, ABGs, chest x-ray reviewed  -Wean support to keep O2 saturation > 91%  -Continue Benadryl and Pepcid  -Continue systemic steroids  -Duoneb  -Pulmicort     Rule out sepsis without shock, secondary to possible aspiration pneumonia  -Witnessed emesis prior to arrival and in ED  -IVF bolus 30 mL/kg per sepsis protocol given in ED  -Continue IVFs  -Lactic Acid 2.2, monitor and trend labs until normalized.  -Blood cultures-pending  -UA-negative  -Procalcitonin-not elevated, CRP-not elevated  -Received cefepime empirically.  Will discontinue and monitor off antibiotics     Gastroesophageal reflux disease with esophagitis  Peptic ulcer disease  -Continue home sucralfate when clinically appropriate  -Continue home Pepcid when clinically appropriate     Numerous nonobstructing bilateral renal calculi  -Osorio catheter placed in ED  -Continue fluids  -Renal ultrasound reviewed,  nonobstructing stone in the right kidney  -Consider nephrology consult if needed     Anxiety  Depressive disorder  -Continue home Seroquel when clinically appropriate  -Continue home Lamictal when clinically appropriate  -Continue home venlafaxine when clinically appropriate     Code Status: FULL  VTE Prophylaxis: SCDs, Arixtra  PUD Prophylaxis: Pepcid      [employer] [place] [employer] [place] & SUBJECTIVE   Patient presented [facility] on 7/31/[facility] via EMS after collapse at home.  Patient has severe allergies, and wife was at home when patient collapsed.  Per wife patient was not breathing and she was unsure if she felt a pulse.  She gave patient epi and attempted to deliver breaths, however did not feel like it was working.  She began chest compressions while on the phone with EMS.  Patient has beef and pork allergy, and had chicken salad today with which wife believes there may have been some cross-contamination.  Wife states that this is happened before, with most recent allergic reaction happening roughly 2 weeks ago however not this severe.  When EMS arrived patient was found to have a pulse and was moving around although not following commands with vomiting.  Patient was given additional Benadryl IV and Zofran in route.  Wife states that EMS attempted to intubate patient however they were unsuccessful.   Patient was given sepsis bolus in ED and was intubated in ED for airway protection.     In the ED, labs were obtained with abnormalities as follows: Glucose 110, ionized calcium 0.83, PTT 20.6.  ABG: pH 7.525, PCO2 22.6, PO2 114.6, HCO3 18.7.  UA negative for UTI.  CXR was negative for acute cardiopulmonary normalities.  CT head without contrast was negative for acute intracranial abnormality.  CT abdomen pelvis showed stomach distention with air.  Double areas of cortical hypoenhancement involving both kidneys, which could be related to hypotension, acute tubular necrosis, or polynephritis.  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    REVIEW OF SYSTEMS:  8/1: intubated, awake and agitated    MEDICATIONS     SCHEDULED MEDICATIONS:   budesonide, 0.5 mg, Nebulization, BID - RT  chlorhexidine, 15 mL, Mouth/Throat, Q12H  diphenhydrAMINE, 25 mg, Intravenous, Q6H  famotidine, 20 mg, Intravenous, BID  ipratropium-albuterol, 3 mL, Nebulization, 4x Daily - RT  methylPREDNISolone sodium succinate, 20 mg, Intravenous, Q6H  sodium chloride, 10 mL, Intravenous, Q12H        CONTINUOUS INFUSIONS:   dexmedetomidine, 0.2-1.5 mcg/kg/hr, Last Rate: Stopped (08/01/21 0755)  fentanyl 10 mcg/mL, 100 mcg/hr, Last Rate: Stopped (08/01/21 0729)  propofol, 5-50 mcg/kg/min, Last Rate: Stopped (08/01/21 0729)        PRN MEDS:  •  acetaminophen **OR** acetaminophen  •  nitroglycerin  •  ondansetron **OR** ondansetron  •  [COMPLETED] Insert peripheral IV **AND** sodium chloride  •  sodium chloride    OBJECTIVE     VITAL SIGNS:  /72   Pulse 109   Temp 97.7 °F (36.5 °C)   Resp 26   Ht 182.9 cm (72\")   Wt 76.4 kg (168 lb 6.9 oz)   SpO2 98%   BMI 22.84 kg/m²     I/O FROM PREVIOUS 3 SHIFTS:  I/O last 3 completed shifts:  In: 3307 [I.V.:36; IV Piggyback:3271]  Out: 2600 [Urine:2600]      I/O PREVIOUS 24 HOURS:   Intake/Output                 07/31/21 0701 - 08/01/21 0700     [id number] Total              Intake    P.O.  --  0 0    I.V.  --  36 36    IV Piggyback  --  3271 3271    Total Intake -- 3307 3307       Output    Urine  --  2600 2600    Total Output -- 2600 2600           NET BALANCE SINCE ADMISSION:  Net IO Since Admission: 707 mL [08/01/21 0756]     PHYSICAL EXAM:  General Appearance:  Alert, agitated  Head:  Normocephalic, without obvious abnormality, atraumatic. OETT  Eyes:  PERRL, conjunctiva/corneas clear, EOM not assessed     Neck:  Supple, no JVD, trachea midline  Lungs:   Coarse but clear throughout, no rhonchi or wheezing, respirations " "unlabored without accessory muscle use  Chest wall:  Symmetrical chest wall movement  Heart:  Sinus tachycardia, S1 and S2 normal, no murmur, rub or gallop  Abdomen:  Soft, non-distended, bowel sounds active all four quadrants  Extremities:  No clubbing, no cyanosis or edema  Skin:  No rashes or lesions  Neurologic:   Alert and agitated. Follows commands.  No lateralizing deficits noted      RESULTS     LABS:  Lab Results (last 24 hours)     Procedure Component Value Units Date/Time    Lactic Acid, Plasma [[id number]]  (Normal) Collected: 08/01/21 0641    Specimen: Blood Updated: 08/01/21 0710     Lactate 1.6 mmol/L     C-reactive Protein [[id number]]  (Normal) Collected: 08/01/21 0446    Specimen: Blood Updated: 08/01/21 0640     C-Reactive Protein <0.30 mg/dL     Procalcitonin [[id number]]  (Normal) Collected: 08/01/21 0446    Specimen: Blood Updated: 08/01/21 0636     Procalcitonin <0.02 ng/mL     Narrative:      As a Marker for Sepsis (Non-Neonates):     1. <0.5 ng/mL represents a low risk of severe sepsis and/or septic shock.  2. >2 ng/mL represents a high risk of severe sepsis and/or septic shock.    As a Marker for Lower Respiratory Tract Infections that require antibiotic therapy:  PCT on Admission     Antibiotic Therapy             6-12 Hrs later  >0.5                          Strongly Recommended            >0.25 - <0.5             Recommended  0.1 - 0.25                  Discouraged                       Remeasure/reassess PCT  <0.1                         Strongly Discouraged         Remeasure/reassess PCT      As 28 day mortality risk marker: \"Change in Procalcitonin Result\" (>80% or <=80%) if Day 0 (or Day 1) and Day 4 values are available. Refer to http://www.[employer]-pct-calculator.com/    Change in PCT <=80 %   A decrease of PCT levels below or equal to 80% defines a positive change in PCT test result representing a higher risk for 28-day all-cause mortality of patients diagnosed with severe sepsis or " septic shock.    Change in PCT >80 %   A decrease of PCT levels of more than 80% defines a negative change in PCT result representing a lower risk for 28-day all-cause mortality of patients diagnosed with severe sepsis or septic shock.              Results may be falsely decreased if patient taking Biotin.     Comprehensive Metabolic Panel [[id number]]  (Abnormal) Collected: 08/01/21 0446    Specimen: Blood Updated: 08/01/21 0518     Glucose 129 mg/dL      BUN 12 mg/dL      Creatinine 0.99 mg/dL      Sodium 139 mmol/L      Potassium 4.8 mmol/L      Comment: Slight hemolysis detected by analyzer. Results may be affected.        Chloride 105 mmol/L      CO2 23.0 mmol/L      Calcium 8.3 mg/dL      Total Protein 6.1 g/dL      Albumin 4.30 g/dL      ALT (SGPT) 21 U/L      AST (SGOT) 20 U/L      Alkaline Phosphatase 51 U/L      Total Bilirubin 0.2 mg/dL      eGFR Non African Amer 85 mL/min/1.73      Globulin 1.8 gm/dL      A/G Ratio 2.4 g/dL      BUN/Creatinine Ratio 12.1     Anion Gap 11.0 mmol/L     Narrative:      GFR Normal >60  Chronic Kidney Disease <60  Kidney Failure <15      Magnesium [[id number]]  (Normal) Collected: 08/01/21 0446    Specimen: Blood Updated: 08/01/21 0518     Magnesium 2.0 mg/dL     Phosphorus [[id number]]  (Normal) Collected: 08/01/21 0446    Specimen: Blood Updated: 08/01/21 0518     Phosphorus 3.5 mg/dL     CBC & Differential [[id number]]  (Abnormal) Collected: 08/01/21 0446    Specimen: Blood Updated: 08/01/21 0500    Narrative:      The following orders were created for panel order CBC & Differential.  Procedure                               Abnormality         Status                     ---------                               -----------         ------                     CBC Auto Differential[[id number]]        Abnormal            Final result                 Please view results for these tests on the individual orders.    CBC Auto Differential [[id number]]  (Abnormal) Collected: 08/01/21 0446     Specimen: Blood Updated: 08/01/21 0500     WBC 8.30 10*3/mm3      RBC 4.53 10*6/mm3      Hemoglobin 14.7 g/dL      Hematocrit 43.7 %      MCV 96.4 fL      MCH 32.5 pg      MCHC 33.7 g/dL      RDW 13.1 %      RDW-SD 43.8 fl      MPV 8.3 fL      Platelets 286 10*3/mm3      Neutrophil % 94.0 %      Lymphocyte % 4.9 %      Monocyte % 0.7 %      Eosinophil % 0.0 %      Basophil % 0.4 %      Neutrophils, Absolute 7.80 10*3/mm3      Lymphocytes, Absolute 0.40 10*3/mm3      Monocytes, Absolute 0.10 10*3/mm3      Eosinophils, Absolute 0.00 10*3/mm3      Basophils, Absolute 0.00 10*3/mm3      nRBC 0.0 /100 WBC     Blood Gas, Arterial - [[id number]]  (Abnormal) Collected: 08/01/21 0433    Specimen: Arterial Blood Updated: 08/01/21 0437     Site Right Radial     Rahat's Test Positive     pH, Arterial 7.281 pH units      pCO2, Arterial 54.8 mm Hg      pO2, Arterial 212.1 mm Hg      HCO3, Arterial 25.8 mmol/L      Base Excess, Arterial -1.9 mmol/L      Comment: Serial Number: [id number]:  [id number]        O2 Saturation, Arterial 99.6 %      CO2 Content 27.5 mmol/L      Barometric Pressure for Blood Gas --     Comment: N/A        Modality Adult Vent     FIO2 50 %      Ventilator Mode ;VS     Set Tidal Volume 400     Rate 22.0000 Breaths/minute      PEEP 5     PIP 11 cmH2O      Hemodilution No     Respiratory Rate 18    STAT Lactic Acid, Reflex [[id number]]  (Abnormal) Collected: 08/01/21 0101    Specimen: Blood Updated: 08/01/21 0128     Lactate 2.2 mmol/L     POC Glucose Once [[id number]]  (Abnormal) Collected: 08/01/21 0103    Specimen: Blood Updated: 08/01/21 0108     Glucose 116 mg/dL      Comment: Serial Number: [id number]:  [id number]       COVID PRE-OP / PRE-PROCEDURE SCREENING ORDER (NO ISOLATION) - Swab, Nasopharynx [[id number]]  (Normal) Collected: 07/31/21 2305    Specimen: Swab from Nasopharynx Updated: 08/01/21 0030    Narrative:      The following orders were created for panel order COVID PRE-OP / PRE-PROCEDURE  SCREENING ORDER (NO ISOLATION) - Swab, Nasopharynx.  Procedure                               Abnormality         Status                     ---------                               -----------         ------                     COVID-19,CEPHEID,COR/JENNIFER...[[id number]]  Normal              Final result                 Please view results for these tests on the individual orders.    COVID-19,CEPHEID,COR/JENNIFER/PAD/DIANDRA IN-HOUSE(OR EMERGENT/ADD-ON),NP SWAB IN TRANSPORT MEDIA 3-4 HR TAT, RT-PCR - Swab, Nasopharynx [[id number]]  (Normal) Collected: 07/31/21 2305    Specimen: Swab from Nasopharynx Updated: 08/01/21 0030     COVID19 Not Detected    Narrative:      Fact sheet for providers: https://www.fda.gov/media/672964/download     Fact sheet for patients: https://www.fda.gov/media/801729/download  Fact sheet for providers: https://www.fda.gov/media/500259/download     Fact sheet for patients: https://www.fda.gov/media/472362/download    Urine Drug Screen - Urine, Clean Catch [[id number]]  (Normal) Collected: 07/31/21 2134    Specimen: Urine, Clean Catch Updated: 07/31/21 2259     Amphet/Methamphet, Screen Negative     Barbiturates Screen, Urine Negative     Benzodiazepine Screen, Urine Negative     Cocaine Screen, Urine Negative     Opiate Screen Negative     THC, Screen, Urine Negative     Methadone Screen, Urine Negative     Oxycodone Screen, Urine Negative    Narrative:      Negative Thresholds Per Drugs Screened:    Amphetamines                 500 ng/ml  Barbiturates                 200 ng/ml  Benzodiazepines              100 ng/ml  Cocaine                      300 ng/ml  Methadone                    300 ng/ml  Opiates                      300 ng/ml  Oxycodone                    100 ng/ml  THC                           50 ng/ml    The Normal Value for all drugs tested is negative. This report includes final unconfirmed screening results to be used for medical treatment purposes only. Unconfirmed results must not be used  "for non-medical purposes such as employment or legal testing. Clinical consideration should be applied to any drug of abuse test, particularly when unconfirmed results are used.          All urine drugs of abuse requests without chain of custody are for medical screening purposes only.  False positives are possible.      Blood Culture - Blood, Arm, Left [[id number]] Collected: 07/31/21 2227    Specimen: Blood from Arm, Left Updated: 07/31/21 2234    Blood Culture - Blood, Arm, Right [[id number]] Collected: 07/31/21 2232    Specimen: Blood from Arm, Right Updated: 07/31/21 2234    Salicylate Level [[id number]]  (Normal) Collected: 07/31/21 2130    Specimen: Blood Updated: 07/31/21 2214     Salicylate <0.3 mg/dL     Procalcitonin [[id number]]  (Normal) Collected: 07/31/21 2130    Specimen: Blood Updated: 07/31/21 2201     Procalcitonin 0.03 ng/mL     Narrative:      As a Marker for Sepsis (Non-Neonates):     1. <0.5 ng/mL represents a low risk of severe sepsis and/or septic shock.  2. >2 ng/mL represents a high risk of severe sepsis and/or septic shock.    As a Marker for Lower Respiratory Tract Infections that require antibiotic therapy:  PCT on Admission     Antibiotic Therapy             6-12 Hrs later  >0.5                          Strongly Recommended            >0.25 - <0.5             Recommended  0.1 - 0.25                  Discouraged                       Remeasure/reassess PCT  <0.1                         Strongly Discouraged         Remeasure/reassess PCT      As 28 day mortality risk marker: \"Change in Procalcitonin Result\" (>80% or <=80%) if Day 0 (or Day 1) and Day 4 values are available. Refer to http://www.[employer]-pct-calculator.com/    Change in PCT <=80 %   A decrease of PCT levels below or equal to 80% defines a positive change in PCT test result representing a higher risk for 28-day all-cause mortality of patients diagnosed with severe sepsis or septic shock.    Change in PCT >80 %   A decrease of PCT " levels of more than 80% defines a negative change in PCT result representing a lower risk for 28-day all-cause mortality of patients diagnosed with severe sepsis or septic shock.              Results may be falsely decreased if patient taking Biotin.     TSH [[id number]]  (Normal) Collected: 07/31/21 2130    Specimen: Blood Updated: 07/31/21 2201     TSH 1.660 uIU/mL     Comprehensive Metabolic Panel [[id number]]  (Abnormal) Collected: 07/31/21 2130    Specimen: Blood Updated: 07/31/21 2157     Glucose 99 mg/dL      BUN 12 mg/dL      Creatinine 1.23 mg/dL      Sodium 143 mmol/L      Potassium 4.2 mmol/L      Comment: Slight hemolysis detected by analyzer. Results may be affected.        Chloride 106 mmol/L      CO2 20.0 mmol/L      Calcium 9.5 mg/dL      Total Protein 7.0 g/dL      Albumin 4.70 g/dL      ALT (SGPT) 24 U/L      AST (SGOT) 23 U/L      Alkaline Phosphatase 53 U/L      Total Bilirubin 0.3 mg/dL      eGFR Non African Amer 66 mL/min/1.73      Globulin 2.3 gm/dL      A/G Ratio 2.0 g/dL      BUN/Creatinine Ratio 9.8     Anion Gap 17.0 mmol/L     Narrative:      GFR Normal >60  Chronic Kidney Disease <60  Kidney Failure <15      Troponin [[id number]]  (Normal) Collected: 07/31/21 2130    Specimen: Blood Updated: 07/31/21 2157     Troponin T <0.010 ng/mL     Narrative:      Troponin T Reference Range:  <= 0.03 ng/mL-   Negative for AMI  >0.03 ng/mL-     Abnormal for myocardial necrosis.  Clinicians would have to utilize clinical acumen, EKG, Troponin and serial changes to determine if it is an Acute Myocardial Infarction or myocardial injury due to an underlying chronic condition.       Results may be falsely decreased if patient taking Biotin.      Lipase [[id number]]  (Normal) Collected: 07/31/21 2130    Specimen: Blood Updated: 07/31/21 2157     Lipase 29 U/L     Acetaminophen Level [[id number]]  (Normal) Collected: 07/31/21 2130    Specimen: Blood Updated: 07/31/21 2157     Acetaminophen <5.0 mcg/mL      Narrative:      Acetaminophen Therapeutic Range  5-20 ug/mL      Hours after ingestion            Toxic Value    4 Hours                           150 ug/mL    8 Hours                            70 ug/mL   12 Hours                            40 ug/mL   16 Hours                            20 ug/mL    These values apply to a single ingestion only.     CK [[id number]]  (Normal) Collected: 07/31/21 2130    Specimen: Blood Updated: 07/31/21 2157     Creatine Kinase 109 U/L     Ethanol [[id number]] Collected: 07/31/21 2130    Specimen: Blood Updated: 07/31/21 2157     Ethanol % 0.164 %     Narrative:      Plasma Ethanol Clinical Symptoms:    ETOH (%)               Clinical Symptom  .01-.05              No apparent influence  .03-.12              Euphoria, Diminished judgment and attention   .09-.25              Impaired comprehension, Muscle incoordination  .18-.30              Confusion, Staggered gait, Slurred speech  .25-.40              Markedly decreased response to stimuli, unable to stand or                        walk, vomitting, sleep or stupor  .35-.50              Comatose, Anesthesia, Subnormal body temperature        Magnesium [[id number]]  (Normal) Collected: 07/31/21 2130    Specimen: Blood Updated: 07/31/21 2157     Magnesium 2.2 mg/dL     Protime-INR [[id number]]  (Normal) Collected: 07/31/21 2130    Specimen: Blood Updated: 07/31/21 2153     Protime 11.2 Seconds      INR 1.01    aPTT [[id number]]  (Abnormal) Collected: 07/31/21 2130    Specimen: Blood Updated: 07/31/21 2153     PTT 20.6 seconds     Urinalysis With Culture If Indicated - Urine, Catheter [[id number]]  (Normal) Collected: 07/31/21 2132    Specimen: Urine, Catheter Updated: 07/31/21 2139     Color, UA Yellow     Appearance, UA Clear     pH, UA 6.0     Specific Gravity, UA <=1.005     Glucose, UA Negative     Ketones, UA Negative     Bilirubin, UA Negative     Blood, UA Negative     Protein, UA Negative     Leuk Esterase, UA Negative     Nitrite,  UA Negative     Urobilinogen, UA 0.2 E.U./dL    Narrative:      Urine microscopic not indicated.    CBC & Differential [[id number]]  (Normal) Collected: 07/31/21 2130    Specimen: Blood from Arm, Left Updated: 07/31/21 2135    Narrative:      The following orders were created for panel order CBC & Differential.  Procedure                               Abnormality         Status                     ---------                               -----------         ------                     CBC Auto Differential[[id number]]        Normal              Final result                 Please view results for these tests on the individual orders.    CBC Auto Differential [[id number]]  (Normal) Collected: 07/31/21 2130    Specimen: Blood from Arm, Left Updated: 07/31/21 2135     WBC 8.00 10*3/mm3      RBC 4.96 10*6/mm3      Hemoglobin 16.2 g/dL      Hematocrit 47.9 %      MCV 96.6 fL      MCH 32.6 pg      MCHC 33.7 g/dL      RDW 12.9 %      RDW-SD 43.3 fl      MPV 8.2 fL      Platelets 314 10*3/mm3      Neutrophil % 56.5 %      Lymphocyte % 30.8 %      Monocyte % 10.7 %      Eosinophil % 1.4 %      Basophil % 0.6 %      Neutrophils, Absolute 4.50 10*3/mm3      Lymphocytes, Absolute 2.50 10*3/mm3      Monocytes, Absolute 0.90 10*3/mm3      Eosinophils, Absolute 0.10 10*3/mm3      Basophils, Absolute 0.10 10*3/mm3      nRBC 0.1 /100 WBC     POCT Electrolytes +HGB +HCT [[id number]]  (Abnormal) Collected: 07/31/21 2124    Specimen: Blood Updated: 07/31/21 2132     Sodium 144 mmol/L      POC Potassium 4.1 mmol/L      Ionized Calcium 0.83 mmol/L      Comment: Serial Number: [id number]:  [id number]        Glucose 110 mg/dL      Hematocrit 43 %      Hemoglobin 14.7 g/dL     POC Lactate [[id number]]  (Abnormal) Collected: 07/31/21 2124    Specimen: Blood Updated: 07/31/21 2131     Lactate 6.5 mmol/L      Comment: Serial Number: [id number]:  [id number]       POC Glucose Once [[id number]]  (Abnormal) Collected: 07/31/21 2124    Specimen: Blood  Updated: 07/31/21 2128     Glucose 110 mg/dL      Comment: Serial Number: [id number]:  [id number]       Blood Gas, Arterial - [[id number]]  (Abnormal) Collected: 07/31/21 2124    Specimen: Arterial Blood Updated: 07/31/21 2128     Site Right Radial     Rahat's Test Positive     pH, Arterial 7.525 pH units      pCO2, Arterial 22.6 mm Hg      pO2, Arterial 114.6 mm Hg      HCO3, Arterial 18.7 mmol/L      Base Excess, Arterial -2.0 mmol/L      Comment: Serial Number: [id number]:  [id number]        O2 Saturation, Arterial 99.0 %      CO2 Content 19.4 mmol/L      Barometric Pressure for Blood Gas --     Comment: N/A        Modality Cannula     FIO2 40 %      Hemodilution No           RADIOLOGY:  CT Head Without Contrast    Result Date: 7/31/2021  EXAMINATION: CT HEAD WO CONTRAST DATE: 7/31/2021 10:10 PM  INDICATION: Unresponsive. Cardiac arrest.  COMPARISON: None available.  TECHNIQUE: Thin section noncontrast axial images were obtained through the head. Coronal reformatted images were created.  CT dose lowering techniques were used, to include: automated exposure control, adjustment for patient size, and or use of iterative  reconstruction FINDINGS: No acute intracranial hemorrhage. No acute territorial infarct. Gray-white differentiation is maintained. No acute territorial infarct. No intracranial mass or mass effect. No hydrocephalus. Basal cisterns are patent. Globes and orbits are unremarkable. Mild-to-moderate mucosal thickening in the right paranasal sinuses. Mastoid air cells are clear. Calvarium is intact.     1.  No acute intracranial abnormality. No findings of diffuse anoxic brain injury. If there is further concern, suggest repeat exam in 24 hours. 2.  Paranasal sinus mucosal thickening. Electronically signed by:  Neo Carrillo DO  7/31/2021 8:48 PM    CT Abdomen Pelvis With Contrast    Result Date: 7/31/2021  Exam: CT abdomen and pelvis with contrast Date: July 31, 2021 History: Unresponsive, abdominal pain  Comparison: August 22, 2019 Technique: Contiguous axial CT images were obtained of the abdomen and pelvis following the uneventful intravenous administration of 100 mL Isovue-370 contrast. Additionally, sagittal and coronal reformatted images were obtained.  CT dose lowering techniques were used, to include: automated exposure control, adjustment for patient size, and or use of iterative reconstruction. Findings: Lung bases: There is compressive atelectasis in the lung bases. The heart is not enlarged. Liver: The liver is mildly hypodense. Spleen: Normal. Pancreas: Normal. Venograms: Normal. Gallbladder: The gallbladder is surgically absent. Kidneys: There are subtle areas of cortical hypoenhancement involving both kidneys. There are numerous nonobstructing bilateral renal calculi. There is no hydronephrosis or obstructive uropathy. Abdominal mesentery: There is no pathologic abdominal mass or adenopathy. Bowel loops: There is a moderate to large amount of retained colonic fecal debris. The appendix is surgically absent. The stomach is distended with air, fluid and debris. There is no small bowel obstruction. CT PELVIS: There is a Osorio catheter within the urinary bladder. Abdominal aorta: There is no aneurysm or dissection. 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 There is no evidence of osseous collapse. No acute fractures are identified.     1. The stomach is distended with air, fluid and debris. Etiology is uncertain. 2. Subtle areas of cortical hypoenhancement involving both kidneys. This could be related to hypotension, acute tubular necrosis or pyelonephritis. 3. 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 4. Numerous nonobstructing bilateral renal calculi. 5. Constipation. Slot 63 Electronically signed by:  Leodan Topete M.D.  7/31/2021 8:39 PM    XR Chest 1 View    Result Date: 7/31/2021  FRONTAL  VIEW OF THE CHEST CLINICAL INDICATION: Altered mental status. COMPARISON: 7/31/2021. FINDINGS: No focal consolidation, pleural effusion or pneumothorax. Cardiomediastinal morphology is normal. Osseous structures are unremarkable.     No acute cardiopulmonary abnormality. Electronically signed by:  Ryland Massey M.D.  7/31/2021 8:40 PM    XR Chest 1 View    Result Date: 7/31/2021  Exam:  Single view chest Date: July 31, 2021 History: Respiratory failure, unresponsive Comparison: Same day Technique: Single frontal radiograph of the chest was obtained Findings: There is a well-positioned endotracheal tube. The heart is not enlarged. Mediastinum and pulmonary vasculature are unremarkable. There is no pneumothorax or sizable pleural fluid collection.     Impression: 1. Well-positioned endotracheal tube. Slot 63 Electronically signed by:  Leodan Topete M.D.  7/31/2021 8:35 PM    CT Chest Pulmonary Embolism    Result Date: 7/31/2021  Exam: CTA thorax, PE protocol Date: July 31, 2021 History: Unresponsive, cardiac arrest, respiratory failure Comparison: None available Technique: Contiguous axial CT images obtained of the thorax following the uneventful intravenous administration of 100 mL Isovue-370 contrast. Additionally, sagittal, coronal and 3-D reformatted images were obtained. CT dose lowering techniques were used, to include: automated exposure control, adjustment for patient size, and or use of iterative reconstruction. Findings: Thoracic aorta: There is limited characterization of the ascending aorta secondary to motion artifact. There is no aneurysm. HEART: Normal. Pulmonary arteries: The pulmonary arteries are reasonably well visualized. There is no filling defect to suggest an acute pulmonary embolus. Mediastinum: There is a well-positioned endotracheal tube. There is no pathologic mediastinal adenopathy. Lung parenchyma: There is compressive bibasilar atelectasis. There is no pneumothorax or pleural fluid  collection. Upper abdomen: For evaluation of the abdomen, please see the full dictated report of the CT study of the abdomen and pelvis. Osseous structures: No acute fractures are identified.     1. No pulmonary embolus. 2. No acute abnormality. Slot 63 Electronically signed by:  Leodan Topete M.D.  7/31/2021 8:50 PM      ECHOCARDIOGRAM:       I reviewed the patient's new clinical results.    This note has been scribed by me for pulmonary attending physician.    Electronically signed by DOLORES Wiggins, 08/01/21 at 07:56 EDT.

## 2021-08-01 NOTE — H&P
PULMONARY/CRITICAL CARE HISTORY & PHYSICAL       PATIENT NAME:     Warren Giraldo  :     1980    MRN:     [id number]       ROOM:     [facility] ED      PRIMARY CARE PHYSICIAN:  Baldev Sandy MD    SUBJECTIVE     CHIEF COMPLAINT:   Respiratory arrest, following anaphylaxis    HISTORY OF PRESENT ILLNESS:  Warren Giraldo is a 40 y.o. male  has a past medical history of Acne varioliformis, Acute reaction to stress, Allergic rhinitis, Allergy to alpha-gal, Anxiety, Anxiety, Costochondritis (2013), Depression, Gastroesophageal reflux disease with esophagitis, History of kidney stones, Migraine with aura, and PUD (peptic ulcer disease).   Patient presented to [facility] on 2021 via EMS after collapse at home.  Patient has severe allergies, and wife was at home when patient collapsed.  Per wife patient was not breathing and she was unsure if she felt a pulse.  She gave patient epi and attempted to deliver breaths, however did not feel like it was working.  She began chest compressions while on the phone with EMS.  Patient has beef and pork allergy, and had chicken salad today with which wife believes there may have been some cross-contamination.  Wife states that this is happened before, with most recent allergic reaction happening roughly 2 weeks ago however not this severe.  When EMS arrived patient was found to have a pulse and was moving around although not following commands with vomiting.  Patient was given additional Benadryl IV and Zofran in route.  Wife states that EMS attempted to intubate patient however they were unsuccessful.   Patient was given sepsis bolus in ED and was intubated in ED for airway protection.    In the ED, labs were obtained with abnormalities as follows: Glucose 110, ionized calcium 0.83, PTT 20.6.  ABG: pH 7.525, PCO2 22.6, PO2 114.6, HCO3 18.7.  UA negative for UTI.  CXR was negative for acute cardiopulmonary normalities.  CT head without  contrast was negative for acute intracranial abnormality.  CT abdomen pelvis showed stomach distention with air.  Double areas of cortical hypoenhancement involving both kidneys, which could be related to hypotension, acute tubular necrosis, or polynephritis.  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    Pulmonary/Intensivist service was contacted for admission to ICU and further evaluation and treatment.      REVIEW OF SYSTEMS:  Review of systems could not be obtained due to   patient intubated.      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  -on vent, continue support to keep O2 saturation > 91%  -CXR Reviewed  -EKG Reviewed  -ABG, monitor as ordered  -Duoneb  -Pulmicort    Rule out sepsis without shock, secondary to possible aspiration pneumonia  -Witnessed emesis prior to arrival and in ED  -IVF bolus 30 mL/kg per sepsis protocol given in ED  -Continue IVFs  -Lactic Acid 2.2, monitor and trend labs until normalized.  -Cultures-pending  -UA-negative  -Procalcitonin-pending, CRP-Pending  -Cefepime; deescalate pending culture results    Gastroesophageal reflux disease with esophagitis  Peptic ulcer disease  -Continue home sucralfate when clinically appropriate  -Continue home Pepcid when clinically  appropriate    Numerous nonobstructing bilateral renal calculi  -Osorio catheter placed in ED  -Continue fluids  -Renal ultrasound  -Consider nephrology consult if needed    Anxiety  Depressive disorder  -Continue home Seroquel when clinically appropriate  -Continue home Lamictal when clinically appropriate  -Continue home venlafaxine when clinically appropriate    Code Status: FULL  VTE Prophylaxis: SCDs  PUD Prophylaxis: [facility] MEDICATIONS     SCHEDULED MEDICATIONS:  budesonide, 0.5 mg, Nebulization, BID - RT  chlorhexidine, 15 mL, Mouth/Throat, Q12H  famotidine, 20 mg, Intravenous, BID  ipratropium-albuterol, 3 mL, Nebulization, 4x Daily - RT  midazolam, 2 mg, Intravenous, Once  sodium chloride, 10 mL, Intravenous, Q12H         CONTINUOUS INFUSIONS:    fentanyl 10 mcg/mL, 100 mcg/hr, Last Rate: 250 mcg/hr (08/01/21 0534)  propofol, 5-50 mcg/kg/min, Last Rate: 50 mcg/kg/min (08/01/21 0509)         PRN MEDICATIONS:   •  acetaminophen **OR** acetaminophen  •  nitroglycerin  •  ondansetron **OR** ondansetron  •  [COMPLETED] Insert peripheral IV **AND** sodium chloride  •  sodium chloride       OBJECTIVE     VITAL SIGNS:  /82   Pulse 104   Resp (!) 30   SpO2 100%     Wt Readings from Last 3 Encounters:   07/23/21 75.7 kg (166 lb 12.8 oz)   07/21/21 77.1 kg (170 lb)   03/20/20 78.4 kg (172 lb 13.5 oz)       INTAKE/OUTPUT:    Intake/Output Summary (Last 24 hours) at 7/31/2021 2330  Last data filed at 7/31/2021 2327  Gross per 24 hour   Intake 3271 ml   Output --   Net 3271 ml       PHYSICAL EXAM:   Constitutional:  Well developed, well nourished, no acute distress, non-toxic appearance   Eyes:  PERRL, conjunctiva normal, EOMI   HENT:  Atraumatic, external ears normal, nose normal, ETT noted oropharynx moist, no pharyngeal exudates. Neck-normal range of motion, no tenderness  Respiratory: Clear to ascultation bilateral, non-labored respirations without accessory muscle use  Cardiovascular:  Normal  rate, normal rhythm, no murmurs, no gallops, no rubs   GI:  Soft, nondistended, hypoactive bowel sounds, nontender, no rebound or guarding   :  No costovertebral angle tenderness, Osorio-catheter noted  Musculoskeletal:  No edema, no tenderness, no deformities  Integument:  Well hydrated, no rash   Neurologic: Unable to assess as patient is intubated and sedated, normal motor function, normal sensory function, no gross motor deficits noted   Psychiatric: Unable to assess as patient is intubated and sedated.      HISTORY     HISTORY:  Past Medical History:   Diagnosis Date   • Acne varioliformis    • Acute reaction to stress    • Allergic rhinitis    • Allergy to alpha-gal    • Anxiety    • Anxiety    • Costochondritis 03/04/2013   • Depression    • Gastroesophageal reflux disease with esophagitis    • History of kidney stones    • Migraine with aura    • PUD (peptic ulcer disease)      Past Surgical History:   Procedure Laterality Date   • APPENDECTOMY     • CHOLECYSTECTOMY     • HYDROCELE EXCISION / REPAIR Left      Family History   Problem Relation Age of Onset   • Diabetes Father         passed away due to complications   • Coronary artery disease Father    • Mental illness Neg Hx    • Alcohol abuse Neg Hx    • Drug abuse Neg Hx      Social History     Socioeconomic History   • Marital status:      Spouse name: Not on file   • Number of children: Not on file   • Years of education: Not on file   • Highest education level: Not on file   Tobacco Use   • Smoking status: Never Smoker   • Smokeless tobacco: Never Used   Substance and Sexual Activity   • Alcohol use: Yes     Alcohol/week: 3.0 standard drinks     Types: 3 Cans of beer per week   • Drug use: Defer   • Sexual activity: Defer        HOME MEDICATIONS:   Prior to Admission medications    Medication Sig Start Date End Date Taking? Authorizing Provider   albuterol sulfate  (90 Base) MCG/ACT inhaler Inhale 2 puffs Every 4 (Four) Hours As Needed  for Wheezing. 7/23/21   Gustavo Escobedo Sr., MD   cetirizine (zyrTEC) 10 MG tablet Take 10 mg by mouth Daily.    Nurse Janiya Rivas RN   doxycycline (MONODOX) 100 MG capsule Take 1 capsule by mouth 2 (Two) Times a Day for 14 days. 7/23/21 8/6/21  Gustavo Escobedo Sr., MD   EPINEPHrine (EPIPEN) 0.3 MG/0.3ML solution auto-injector injection Inject 0.3 mg into the appropriate muscle as directed by prescriber. 10/6/17   Nurse Janiya Rivas RN   famotidine (PEPCID) 10 MG tablet Take 10 mg by mouth 2 (Two) Times a Day.    Nurse Janiya Rivas RN   lamoTRIgine (LaMICtal) 100 MG tablet Take 100 mg by mouth Daily.    Nurse Janiya Rivas RN   LORazepam (ATIVAN) 1 MG tablet TK 1 T PO BID PRN 7/29/19   Nurse Janiya Rivas RN   multivitamin (ONE-A-DAY MENS PO) Take  by mouth Daily.    Nurse Janiya Rivas RN   predniSONE (DELTASONE) 10 MG tablet Start with 4 tablets tomorrow and wean by 1 tablet a day over 4 days 7/21/21   Simone Moulton MD   QUEtiapine XR (SEROQUEL XR) 400 MG 24 hr tablet Take  by mouth Daily.    Nurse Janiya Rivas RN   venlafaxine XR (EFFEXOR XR) 150 MG 24 hr capsule Take  by mouth.    Nurse Janiya Rivas RN     IMMUNIZATIONS:  Immunization History   Administered Date(s) Administered   • Td, Not Adsorbed 12/17/2019        ALLERGIES:  Bee venom, Beef-derived products, Latex, Other, and Pork-derived products      RESULTS     LABS:  Lab Results (last 24 hours)     Procedure Component Value Units Date/Time    Blood Gas, Arterial - [[id number]]  (Abnormal) Collected: 07/31/21 2124    Specimen: Arterial Blood Updated: 07/31/21 2128     Site Right Radial     Rahat's Test Positive     pH, Arterial 7.525 pH units      pCO2, Arterial 22.6 mm Hg      pO2, Arterial 114.6 mm Hg      HCO3, Arterial 18.7 mmol/L      Base Excess, Arterial -2.0 mmol/L      Comment: Serial Number: [id number]:  [id number]        O2 Saturation, Arterial 99.0 %      CO2 Content 19.4 mmol/L      Barometric Pressure for  Blood Gas --     Comment: N/A        Modality Cannula     FIO2 40 %      Hemodilution No    POCT Electrolytes +HGB +HCT [[id number]]  (Abnormal) Collected: 07/31/21 2124    Specimen: Blood Updated: 07/31/21 2132     Sodium 144 mmol/L      POC Potassium 4.1 mmol/L      Ionized Calcium 0.83 mmol/L      Comment: Serial Number: [id number]:  [id number]        Glucose 110 mg/dL      Hematocrit 43 %      Hemoglobin 14.7 g/dL     POC Lactate [[id number]]  (Abnormal) Collected: 07/31/21 2124    Specimen: Blood Updated: 07/31/21 2131     Lactate 6.5 mmol/L      Comment: Serial Number: [id number]:  [id number]       POC Glucose Once [[id number]]  (Abnormal) Collected: 07/31/21 2124    Specimen: Blood Updated: 07/31/21 2128     Glucose 110 mg/dL      Comment: Serial Number: [id number]:  [id number]       CBC & Differential [[id number]]  (Normal) Collected: 07/31/21 2130    Specimen: Blood from Arm, Left Updated: 07/31/21 2135    Narrative:      The following orders were created for panel order CBC & Differential.  Procedure                               Abnormality         Status                     ---------                               -----------         ------                     CBC Auto Differential[[id number]]        Normal              Final result                 Please view results for these tests on the individual orders.    Comprehensive Metabolic Panel [[id number]]  (Abnormal) Collected: 07/31/21 2130    Specimen: Blood Updated: 07/31/21 2157     Glucose 99 mg/dL      BUN 12 mg/dL      Creatinine 1.23 mg/dL      Sodium 143 mmol/L      Potassium 4.2 mmol/L      Comment: Slight hemolysis detected by analyzer. Results may be affected.        Chloride 106 mmol/L      CO2 20.0 mmol/L      Calcium 9.5 mg/dL      Total Protein 7.0 g/dL      Albumin 4.70 g/dL      ALT (SGPT) 24 U/L      AST (SGOT) 23 U/L      Alkaline Phosphatase 53 U/L      Total Bilirubin 0.3 mg/dL      eGFR Non African Amer 66 mL/min/1.73      Globulin 2.3 gm/dL   "    A/G Ratio 2.0 g/dL      BUN/Creatinine Ratio 9.8     Anion Gap 17.0 mmol/L     Narrative:      GFR Normal >60  Chronic Kidney Disease <60  Kidney Failure <15      Protime-INR [[id number]]  (Normal) Collected: 07/31/21 2130    Specimen: Blood Updated: 07/31/21 2153     Protime 11.2 Seconds      INR 1.01    aPTT [[id number]]  (Abnormal) Collected: 07/31/21 2130    Specimen: Blood Updated: 07/31/21 2153     PTT 20.6 seconds     Lipase [[id number]]  (Normal) Collected: 07/31/21 2130    Specimen: Blood Updated: 07/31/21 2157     Lipase 29 U/L     Troponin [[id number]]  (Normal) Collected: 07/31/21 2130    Specimen: Blood Updated: 07/31/21 2157     Troponin T <0.010 ng/mL     Narrative:      Troponin T Reference Range:  <= 0.03 ng/mL-   Negative for AMI  >0.03 ng/mL-     Abnormal for myocardial necrosis.  Clinicians would have to utilize clinical acumen, EKG, Troponin and serial changes to determine if it is an Acute Myocardial Infarction or myocardial injury due to an underlying chronic condition.       Results may be falsely decreased if patient taking Biotin.      Procalcitonin [[id number]]  (Normal) Collected: 07/31/21 2130    Specimen: Blood Updated: 07/31/21 2201     Procalcitonin 0.03 ng/mL     Narrative:      As a Marker for Sepsis (Non-Neonates):     1. <0.5 ng/mL represents a low risk of severe sepsis and/or septic shock.  2. >2 ng/mL represents a high risk of severe sepsis and/or septic shock.    As a Marker for Lower Respiratory Tract Infections that require antibiotic therapy:  PCT on Admission     Antibiotic Therapy             6-12 Hrs later  >0.5                          Strongly Recommended            >0.25 - <0.5             Recommended  0.1 - 0.25                  Discouraged                       Remeasure/reassess PCT  <0.1                         Strongly Discouraged         Remeasure/reassess PCT      As 28 day mortality risk marker: \"Change in Procalcitonin Result\" (>80% or <=80%) if Day 0 (or Day " 1) and Day 4 values are available. Refer to http://[email or web address]-pct-[email or web address]/    Change in PCT <=80 %   A decrease of PCT levels below or equal to 80% defines a positive change in PCT test result representing a higher risk for 28-day all-cause mortality of patients diagnosed with severe sepsis or septic shock.    Change in PCT >80 %   A decrease of PCT levels of more than 80% defines a negative change in PCT result representing a lower risk for 28-day all-cause mortality of patients diagnosed with severe sepsis or septic shock.              Results may be falsely decreased if patient taking Biotin.     Acetaminophen Level [[id number]]  (Normal) Collected: 07/31/21 2130    Specimen: Blood Updated: 07/31/21 2157     Acetaminophen <5.0 mcg/mL     Narrative:      Acetaminophen Therapeutic Range  5-20 ug/mL      Hours after ingestion            Toxic Value    4 Hours                           150 ug/mL    8 Hours                            70 ug/mL   12 Hours                            40 ug/mL   16 Hours                            20 ug/mL    These values apply to a single ingestion only.     Ethanol [[id number]] Collected: 07/31/21 2130    Specimen: Blood Updated: 07/31/21 2157     Ethanol % 0.164 %     Narrative:      Plasma Ethanol Clinical Symptoms:    ETOH (%)               Clinical Symptom  .01-.05              No apparent influence  .03-.12              Euphoria, Diminished judgment and attention   .09-.25              Impaired comprehension, Muscle incoordination  .18-.30              Confusion, Staggered gait, Slurred speech  .25-.40              Markedly decreased response to stimuli, unable to stand or                        walk, vomitting, sleep or stupor  .35-.50              Comatose, Anesthesia, Subnormal body temperature        Salicylate Level [[id number]]  (Normal) Collected: 07/31/21 2130    Specimen: Blood Updated: 07/31/21 2214     Salicylate <0.3 mg/dL     CK [[id number]]  (Normal) Collected:  07/31/21 2130    Specimen: Blood Updated: 07/31/21 2157     Creatine Kinase 109 U/L     Magnesium [[id number]]  (Normal) Collected: 07/31/21 2130    Specimen: Blood Updated: 07/31/21 2157     Magnesium 2.2 mg/dL     TSH [[id number]]  (Normal) Collected: 07/31/21 2130    Specimen: Blood Updated: 07/31/21 2201     TSH 1.660 uIU/mL     CBC Auto Differential [[id number]]  (Normal) Collected: 07/31/21 2130    Specimen: Blood from Arm, Left Updated: 07/31/21 2135     WBC 8.00 10*3/mm3      RBC 4.96 10*6/mm3      Hemoglobin 16.2 g/dL      Hematocrit 47.9 %      MCV 96.6 fL      MCH 32.6 pg      MCHC 33.7 g/dL      RDW 12.9 %      RDW-SD 43.3 fl      MPV 8.2 fL      Platelets 314 10*3/mm3      Neutrophil % 56.5 %      Lymphocyte % 30.8 %      Monocyte % 10.7 %      Eosinophil % 1.4 %      Basophil % 0.6 %      Neutrophils, Absolute 4.50 10*3/mm3      Lymphocytes, Absolute 2.50 10*3/mm3      Monocytes, Absolute 0.90 10*3/mm3      Eosinophils, Absolute 0.10 10*3/mm3      Basophils, Absolute 0.10 10*3/mm3      nRBC 0.1 /100 WBC     Urinalysis With Culture If Indicated - Urine, Catheter [[id number]]  (Normal) Collected: 07/31/21 2132    Specimen: Urine, Catheter Updated: 07/31/21 2139     Color, UA Yellow     Appearance, UA Clear     pH, UA 6.0     Specific Gravity, UA <=1.005     Glucose, UA Negative     Ketones, UA Negative     Bilirubin, UA Negative     Blood, UA Negative     Protein, UA Negative     Leuk Esterase, UA Negative     Nitrite, UA Negative     Urobilinogen, UA 0.2 E.U./dL    Narrative:      Urine microscopic not indicated.    Urine Drug Screen - Urine, Clean Catch [[id number]]  (Normal) Collected: 07/31/21 2134    Specimen: Urine, Clean Catch Updated: 07/31/21 2259     Amphet/Methamphet, Screen Negative     Barbiturates Screen, Urine Negative     Benzodiazepine Screen, Urine Negative     Cocaine Screen, Urine Negative     Opiate Screen Negative     THC, Screen, Urine Negative     Methadone Screen, Urine Negative      Oxycodone Screen, Urine Negative    Narrative:      Negative Thresholds Per Drugs Screened:    Amphetamines                 500 ng/ml  Barbiturates                 200 ng/ml  Benzodiazepines              100 ng/ml  Cocaine                      300 ng/ml  Methadone                    300 ng/ml  Opiates                      300 ng/ml  Oxycodone                    100 ng/ml  THC                           50 ng/ml    The Normal Value for all drugs tested is negative. This report includes final unconfirmed screening results to be used for medical treatment purposes only. Unconfirmed results must not be used for non-medical purposes such as employment or legal testing. Clinical consideration should be applied to any drug of abuse test, particularly when unconfirmed results are used.          All urine drugs of abuse requests without chain of custody are for medical screening purposes only.  False positives are possible.      Blood Culture - Blood, Arm, Left [[id number]] Collected: 07/31/21 2227    Specimen: Blood from Arm, Left Updated: 07/31/21 2234    Blood Culture - Blood, Arm, Right [[id number]] Collected: 07/31/21 2232    Specimen: Blood from Arm, Right Updated: 07/31/21 2234            MICRO:  Microbiology Results (last 10 days)     ** No results found for the last 240 hours. **            RADIOLOGY STUDIES:  Imaging Results (Last 72 Hours)     Procedure Component Value Units Date/Time    CT Chest Pulmonary Embolism [[id number]] Collected: 07/31/21 2247     Updated: 07/31/21 2251    Narrative:      Exam: CTA thorax, PE protocol    Date: July 31, 2021    History: Unresponsive, cardiac arrest, respiratory failure    Comparison: None available    Technique: Contiguous axial CT images obtained of the thorax following the uneventful intravenous administration of 100 mL Isovue-370 contrast. Additionally, sagittal, coronal and 3-D reformatted images were obtained. CT dose lowering techniques were   used, to include: automated  exposure control, adjustment for patient size, and or use of iterative reconstruction.    Findings:    Thoracic aorta: There is limited characterization of the ascending aorta secondary to motion artifact. There is no aneurysm.    HEART: Normal.    Pulmonary arteries: The pulmonary arteries are reasonably well visualized. There is no filling defect to suggest an acute pulmonary embolus.    Mediastinum: There is a well-positioned endotracheal tube. There is no pathologic mediastinal adenopathy.    Lung parenchyma: There is compressive bibasilar atelectasis. There is no pneumothorax or pleural fluid collection.    Upper abdomen: For evaluation of the abdomen, please see the full dictated report of the CT study of the abdomen and pelvis.    Osseous structures: No acute fractures are identified.      Impression:      1. No pulmonary embolus.  2. No acute abnormality.    Slot 63    Electronically signed by:  Leodan Topete M.D.    7/31/2021 8:50 PM    CT Head Without Contrast [[id number]] Collected: 07/31/21 2245     Updated: 07/31/21 2249    Narrative:      EXAMINATION: CT HEAD WO CONTRAST    DATE: 7/31/2021 10:10 PM     INDICATION: Unresponsive. Cardiac arrest.     COMPARISON: None available.     TECHNIQUE: Thin section noncontrast axial images were obtained through the head. Coronal reformatted images were created.  CT dose lowering techniques were used, to include: automated exposure control, adjustment for patient size, and or use of iterative   reconstruction    FINDINGS:    No acute intracranial hemorrhage. No acute territorial infarct. Gray-white differentiation is maintained.  No acute territorial infarct. No intracranial mass or mass effect.     No hydrocephalus. Basal cisterns are patent.       Globes and orbits are unremarkable.  Mild-to-moderate mucosal thickening in the right paranasal sinuses. Mastoid air cells are clear.  Calvarium is intact.        Impression:        1.  No acute intracranial abnormality.  No findings of diffuse anoxic brain injury. If there is further concern, suggest repeat exam in 24 hours.  2.  Paranasal sinus mucosal thickening.          Electronically signed by:  Neo Carrillo DO    7/31/2021 8:48 PM    XR Chest 1 View [[id number]] Collected: 07/31/21 2236     Updated: 07/31/21 2241    Narrative:      FRONTAL VIEW OF THE CHEST    CLINICAL INDICATION: Altered mental status.    COMPARISON: 7/31/2021.    FINDINGS: No focal consolidation, pleural effusion or pneumothorax. Cardiomediastinal morphology is normal. Osseous structures are unremarkable.      Impression:      No acute cardiopulmonary abnormality.    Electronically signed by:  Ryland Massey M.D.    7/31/2021 8:40 PM    CT Abdomen Pelvis With Contrast [[id number]] Collected: 07/31/21 2235     Updated: 07/31/21 2240    Narrative:      Exam: CT abdomen and pelvis with contrast    Date: July 31, 2021    History: Unresponsive, abdominal pain    Comparison: August 22, 2019    Technique: Contiguous axial CT images were obtained of the abdomen and pelvis following the uneventful intravenous administration of 100 mL Isovue-370 contrast. Additionally, sagittal and coronal reformatted images were obtained.  CT dose lowering   techniques were used, to include: automated exposure control, adjustment for patient size, and or use of iterative reconstruction.    Findings:    Lung bases: There is compressive atelectasis in the lung bases. The heart is not enlarged.    Liver: The liver is mildly hypodense.    Spleen: Normal.    Pancreas: Normal.    Venograms: Normal.    Gallbladder: The gallbladder is surgically absent.    Kidneys: There are subtle areas of cortical hypoenhancement involving both kidneys. There are numerous nonobstructing bilateral renal calculi. There is no hydronephrosis or obstructive uropathy.    Abdominal mesentery: There is no pathologic abdominal mass or adenopathy.    Bowel loops: There is a moderate to large amount of retained colonic  fecal debris. The appendix is surgically absent. The stomach is distended with air, fluid and debris. There is no small bowel obstruction.    CT PELVIS:  There is a Osorio catheter within the urinary bladder.    Abdominal aorta: There is no aneurysm or dissection.    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 There is no evidence of osseous collapse. No acute fractures are identified.      Impression:      1. The stomach is distended with air, fluid and debris. Etiology is uncertain.  2. Subtle areas of cortical hypoenhancement involving both kidneys. This could be related to hypotension, acute tubular necrosis or pyelonephritis.  3. 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  4. Numerous nonobstructing bilateral renal calculi.  5. Constipation.      Slot 63    Electronically signed by:  Leodan Topete M.D.    7/31/2021 8:39 PM    XR Chest 1 View [[id number]] Collected: 07/31/21 2234     Updated: 07/31/21 2236    Narrative:      Exam:  Single view chest    Date: July 31, 2021    History: Respiratory failure, unresponsive    Comparison: Same day    Technique: Single frontal radiograph of the chest was obtained    Findings:    There is a well-positioned endotracheal tube. The heart is not enlarged. Mediastinum and pulmonary vasculature are unremarkable. There is no pneumothorax or sizable pleural fluid collection.      Impression:      Impression:  1. Well-positioned endotracheal tube.    Slot 63      Electronically signed by:  Leodan Topete M.D.    7/31/2021 8:35 PM        I  reviewed the patient's new clinical results.    I discussed the patient's findings and my recommendations with patient, family and nursing staff.  I have discussed plan with on-call attending physician, who agrees with this plan of care.    Appropriate PPE worn during assessment of patient per established guidelines.      Electronically  signed by Ju Mckeon, DOLORES, 07/31/21, 11:30 PM EDT.

## 2021-08-01 NOTE — ED NOTES
EMS reports that they received the call for cardiac arrest. On arrival they said patient had pulse. Family at scene states patient had allergic reaction( alpha gal) and gave self epi pen. Later spouse came home and gave patient a 2nd epi pen. At that point she called EMS because she states patient had seizure like activity and then lost pulse. Family reports no drug use. Patient noted unresponsive but vomiting. Patient vitals stable on arrival.      Marily Callahan, RN  08/01/21 0622

## 2021-08-01 NOTE — ED PROVIDER NOTES
Subjective   Chief complaint history of present illness is all provided by family as patient is not verbally responsive    Chief complaint possible allergic reaction alpha gal trouble breathing    History of present illness this is a 40-year-old male with history of alpha gal who reportedly ate some chicken salad today and then started to feel ill he took some Benadryl but continued to still have some problems he gave himself an EpiPen ultimately his wife states that he collapsed she gave him an additional injection epi and she started CPR because she cannot again respond unclear if he had a pulse or not EMS arrived shortly afterwards and found to have a pulse and he was moving around but not following commands and vomiting.  Patient was given additional Benadryl IV in route and patient was given Zofran in route.  He remained hemodynamically stable moving everything and vomiting but would not speak.  No other history is available family reports no trauma he said his Covid vaccinations and has been no injury.  He states that he has had some previous problems with this alpha gal he recently had been seen at the urgent care and he just finished some steroids yesterday.  No reported illness recently patient did provide any other history at this time.          Review of Systems   Unable to perform ROS: Mental status change       Past Medical History:   Diagnosis Date   • Acne varioliformis    • Acute reaction to stress    • Allergic rhinitis    • Allergy to alpha-gal    • Anxiety    • Anxiety    • Costochondritis 03/04/2013   • Depression    • Gastroesophageal reflux disease with esophagitis    • History of kidney stones    • Migraine with aura    • PUD (peptic ulcer disease)        Allergies   Allergen Reactions   • Bee Venom Anaphylaxis   • Beef-Derived Products Anaphylaxis     Alpha-gal   • Latex Anaphylaxis   • Other Anaphylaxis     Has Alpha gal so any mammal products   • Pork-Derived Products Anaphylaxis      Alpha-gal         Past Surgical History:   Procedure Laterality Date   • APPENDECTOMY     • CHOLECYSTECTOMY     • HYDROCELE EXCISION / REPAIR Left        Family History   Problem Relation Age of Onset   • Diabetes Father         passed away due to complications   • Coronary artery disease Father    • Mental illness Neg Hx    • Alcohol abuse Neg Hx    • Drug abuse Neg Hx        Social History     Socioeconomic History   • Marital status:      Spouse name: Not on file   • Number of children: Not on file   • Years of education: Not on file   • Highest education level: Not on file   Tobacco Use   • Smoking status: Never Smoker   • Smokeless tobacco: Never Used   Substance and Sexual Activity   • Alcohol use: Yes     Alcohol/week: 3.0 standard drinks     Types: 3 Cans of beer per week   • Drug use: Defer   • Sexual activity: Defer     Prior to Admission medications    Medication Sig Start Date End Date Taking? Authorizing Provider   albuterol sulfate  (90 Base) MCG/ACT inhaler Inhale 2 puffs Every 4 (Four) Hours As Needed for Wheezing. 7/23/21   Gustavo Escobedo Sr., MD   cetirizine (zyrTEC) 10 MG tablet Take 10 mg by mouth Daily.    Nurse Janiya Rivas RN   doxycycline (MONODOX) 100 MG capsule Take 1 capsule by mouth 2 (Two) Times a Day for 14 days. 7/23/21 8/6/21  Gustavo Escobedo Sr., MD   EPINEPHrine (EPIPEN) 0.3 MG/0.3ML solution auto-injector injection Inject 0.3 mg into the appropriate muscle as directed by prescriber. 10/6/17   Nurse Janiya Rivas RN   famotidine (PEPCID) 10 MG tablet Take 10 mg by mouth 2 (Two) Times a Day.    Nurse Janiya Rivas RN   lamoTRIgine (LaMICtal) 100 MG tablet Take 100 mg by mouth Daily.    Nurse Janiya Rivas RN   LORazepam (ATIVAN) 1 MG tablet TK 1 T PO BID PRN 7/29/19   Nurse Janiya Rivas RN   multivitamin (ONE-A-DAY MENS PO) Take  by mouth Daily.    Nurse Janiya Rivas RN   predniSONE (DELTASONE) 10 MG tablet Start with 4 tablets tomorrow and wean by 1  tablet a day over 4 days 7/21/21   Simone Moulton MD   QUEtiapine XR (SEROQUEL XR) 400 MG 24 hr tablet Take  by mouth Daily.    Emergency, Nurse Janiya, RN   venlafaxine XR (EFFEXOR XR) 150 MG 24 hr capsule Take  by mouth.    Emergency, Nurse Epic, RN           Objective   Physical Exam  40-year-old male with EMS bagging him but he is moving everything and vomiting.  Patient has this all over him.  Nonbloody.  HEENT no obvious trauma pupils equal round reactive and all extraocular muscles are intact the mouth is otherwise clear he is a nasal trumpet in the right nares.  Neck no JVD no bruits supple no meningeal signs  Lungs good breath sounds bilaterally sats are 100%.  Heart regular without murmur  Abdomen soft without tenderness or masses no obvious trauma  Extremities no edema good and equal pulses upper and lower extremities there is no palp cords or Homans' sign no evidence of DVT.  Neurologic patient is awake but he will not speak at this time he is moving everything he does not follow bands.  Does have some moaning speech Alesia Coma Scale 10  Intubation    Date/Time: 7/31/2021 11:23 PM  Performed by: Nestor Hawk MD  Authorized by: Nestor Hawk MD     Consent:     Consent obtained:  Emergent situation  Pre-procedure details:     Patient status:  Altered mental status    Pretreatment meds: Propofol.    Paralytics:  Succinylcholine  Procedure details:     Preoxygenation:  Nasal cannula    CPR in progress: no      Intubation method:  Oral    Tube size (mm):  7.5    Tube type:  Double lumen    Number of attempts:  1    Cricoid pressure: yes    Placement assessment:     ETT to lip:  23 cm    Tube secured with:  ETT lechuga    Breath sounds:  Absent over the epigastrium and equal    Placement verification: chest rise, CXR verification, direct visualization, equal breath sounds and ETCO2 detector      CXR findings:  ETT in proper place  Post-procedure details:     Patient tolerance of procedure:   Tolerated well, no immediate complications               ED Course      Results for orders placed or performed during the hospital encounter of 07/31/21   Comprehensive Metabolic Panel    Specimen: Blood   Result Value Ref Range    Glucose 99 65 - 99 mg/dL    BUN 12 6 - 20 mg/dL    Creatinine 1.23 0.76 - 1.27 mg/dL    Sodium 143 136 - 145 mmol/L    Potassium 4.2 3.5 - 5.2 mmol/L    Chloride 106 98 - 107 mmol/L    CO2 20.0 (L) 22.0 - 29.0 mmol/L    Calcium 9.5 8.6 - 10.5 mg/dL    Total Protein 7.0 6.0 - 8.5 g/dL    Albumin 4.70 3.50 - 5.20 g/dL    ALT (SGPT) 24 1 - 41 U/L    AST (SGOT) 23 1 - 40 U/L    Alkaline Phosphatase 53 39 - 117 U/L    Total Bilirubin 0.3 0.0 - 1.2 mg/dL    eGFR Non African Amer 66 >60 mL/min/1.73    Globulin 2.3 gm/dL    A/G Ratio 2.0 g/dL    BUN/Creatinine Ratio 9.8 7.0 - 25.0    Anion Gap 17.0 (H) 5.0 - 15.0 mmol/L   Protime-INR    Specimen: Blood   Result Value Ref Range    Protime 11.2 9.6 - 11.7 Seconds    INR 1.01 0.93 - 1.10   aPTT    Specimen: Blood   Result Value Ref Range    PTT 20.6 (L) 24.0 - 31.0 seconds   Lipase    Specimen: Blood   Result Value Ref Range    Lipase 29 13 - 60 U/L   Urinalysis With Culture If Indicated - Urine, Catheter    Specimen: Urine, Catheter   Result Value Ref Range    Color, UA Yellow Yellow, Straw    Appearance, UA Clear Clear    pH, UA 6.0 5.0 - 8.0    Specific Gravity, UA <=1.005 1.005 - 1.030    Glucose, UA Negative Negative    Ketones, UA Negative Negative    Bilirubin, UA Negative Negative    Blood, UA Negative Negative    Protein, UA Negative Negative    Leuk Esterase, UA Negative Negative    Nitrite, UA Negative Negative    Urobilinogen, UA 0.2 E.U./dL 0.2 - 1.0 E.U./dL   Troponin    Specimen: Blood   Result Value Ref Range    Troponin T <0.010 0.000 - 0.030 ng/mL   Procalcitonin    Specimen: Blood   Result Value Ref Range    Procalcitonin 0.03 0.00 - 0.25 ng/mL   Acetaminophen Level    Specimen: Blood   Result Value Ref Range    Acetaminophen <5.0  0.0 - 30.0 mcg/mL   Ethanol    Specimen: Blood   Result Value Ref Range    Ethanol % 0.164 %   Urine Drug Screen - Urine, Clean Catch    Specimen: Urine, Clean Catch   Result Value Ref Range    Amphet/Methamphet, Screen Negative Negative    Barbiturates Screen, Urine Negative Negative    Benzodiazepine Screen, Urine Negative Negative    Cocaine Screen, Urine Negative Negative    Opiate Screen Negative Negative    THC, Screen, Urine Negative Negative    Methadone Screen, Urine Negative Negative    Oxycodone Screen, Urine Negative Negative   Salicylate Level    Specimen: Blood   Result Value Ref Range    Salicylate <0.3 <=30.0 mg/dL   CK    Specimen: Blood   Result Value Ref Range    Creatine Kinase 109 20 - 200 U/L   Magnesium    Specimen: Blood   Result Value Ref Range    Magnesium 2.2 1.6 - 2.6 mg/dL   TSH    Specimen: Blood   Result Value Ref Range    TSH 1.660 0.270 - 4.200 uIU/mL   CBC Auto Differential    Specimen: Arm, Left; Blood   Result Value Ref Range    WBC 8.00 3.40 - 10.80 10*3/mm3    RBC 4.96 4.14 - 5.80 10*6/mm3    Hemoglobin 16.2 13.0 - 17.7 g/dL    Hematocrit 47.9 37.5 - 51.0 %    MCV 96.6 79.0 - 97.0 fL    MCH 32.6 26.6 - 33.0 pg    MCHC 33.7 31.5 - 35.7 g/dL    RDW 12.9 12.3 - 15.4 %    RDW-SD 43.3 37.0 - 54.0 fl    MPV 8.2 6.0 - 12.0 fL    Platelets 314 140 - 450 10*3/mm3    Neutrophil % 56.5 42.7 - 76.0 %    Lymphocyte % 30.8 19.6 - 45.3 %    Monocyte % 10.7 5.0 - 12.0 %    Eosinophil % 1.4 0.3 - 6.2 %    Basophil % 0.6 0.0 - 1.5 %    Neutrophils, Absolute 4.50 1.70 - 7.00 10*3/mm3    Lymphocytes, Absolute 2.50 0.70 - 3.10 10*3/mm3    Monocytes, Absolute 0.90 0.10 - 0.90 10*3/mm3    Eosinophils, Absolute 0.10 0.00 - 0.40 10*3/mm3    Basophils, Absolute 0.10 0.00 - 0.20 10*3/mm3    nRBC 0.1 0.0 - 0.2 /100 WBC   Blood Gas, Arterial -    Specimen: Arterial Blood   Result Value Ref Range    Site Right Radial     Rahat's Test Positive     pH, Arterial 7.525 (H) 7.350 - 7.450 pH units    pCO2,  Arterial 22.6 (L) 35.0 - 48.0 mm Hg    pO2, Arterial 114.6 (H) 83.0 - 108.0 mm Hg    HCO3, Arterial 18.7 (L) 21.0 - 28.0 mmol/L    Base Excess, Arterial -2.0 (L) 0.0 - 3.0 mmol/L    O2 Saturation, Arterial 99.0 (H) 94.0 - 98.0 %    CO2 Content 19.4 (L) 22 - 29 mmol/L    Barometric Pressure for Blood Gas      Modality Cannula     FIO2 40 %    Hemodilution No    POCT Electrolytes +HGB +HCT    Specimen: Blood   Result Value Ref Range    Sodium 144 138 - 146 mmol/L    POC Potassium 4.1 3.5 - 4.5 mmol/L    Ionized Calcium 0.83 (C) 1.15 - 1.33 mmol/L    Glucose 110 (H) 74 - 100 mg/dL    Hematocrit 43 38 - 51 %    Hemoglobin 14.7 12.0 - 17.0 g/dL   POC Lactate    Specimen: Blood   Result Value Ref Range    Lactate 6.5 (C) 0.5 - 2.0 mmol/L   POC Glucose Once    Specimen: Blood   Result Value Ref Range    Glucose 110 (H) 74 - 100 mg/dL     XR Chest 1 View    Result Date: 7/31/2021  No acute cardiopulmonary abnormality. Electronically signed by:  Ryland Massey M.D.  7/31/2021 8:40 PM    Medications   sodium chloride 0.9 % flush 10 mL (has no administration in time range)   propofol (DIPRIVAN) infusion 10 mg/mL 100 mL (25 mcg/kg/min × 75.7 kg Intravenous Rate/Dose Change 7/31/21 9895)   fentaNYL 1000 mcg in 100 mL NS infusion (has no administration in time range)   nitroglycerin (NITROSTAT) SL tablet 0.4 mg (has no administration in time range)   sodium chloride 0.9 % flush 10 mL (has no administration in time range)   sodium chloride 0.9 % flush 10 mL (has no administration in time range)   acetaminophen (TYLENOL) tablet 650 mg (has no administration in time range)     Or   acetaminophen (TYLENOL) suppository 650 mg (has no administration in time range)   ondansetron (ZOFRAN) tablet 4 mg ( Oral Not Given:  See Alt 7/31/21 6058)     Or   ondansetron (ZOFRAN) injection 4 mg (4 mg Intravenous Given 7/31/21 0547)   chlorhexidine (PERIDEX) 0.12 % solution 15 mL (has no administration in time range)   famotidine (PEPCID) injection  20 mg (has no administration in time range)   LORazepam (ATIVAN) injection 1 mg (1 mg Intravenous Given 7/31/21 2125)   sodium chloride 0.9 % bolus 1,000 mL (0 mL Intravenous Stopped 7/31/21 2235)   methylPREDNISolone sodium succinate (SOLU-Medrol) injection 125 mg (125 mg Intravenous Given 7/31/21 2318)   sodium chloride 0.9 % bolus 2,271 mL (0 mL/kg × 75.7 kg Intravenous Stopped 7/31/21 2327)   succinylcholine (ANECTINE) injection 100 mg (100 mg Intravenous Given 7/31/21 2155)   sterile water (preservative free) injection 10 mL (10 mL Injection Given 7/31/21 2205)   vecuronium (NORCURON) injection 10 mg (10 mg Intravenous Given 7/31/21 2205)   cefepime 2 gm IVPB in 100 ml NS (MBP) (2 g Intravenous New Bag 7/31/21 2318)   iopamidol (ISOVUE-370) 76 % injection 100 mL (100 mL Intravenous Given 7/31/21 2224)   famotidine (PEPCID) injection 20 mg (20 mg Intravenous Given 7/31/21 2337)          EKG my interpretation normal sinus rhythm rate 100 normal axis hypertrophy QTC of 439 normal EKG                                  MDM  Number of Diagnoses or Management Options  Acute respiratory failure, unspecified whether with hypoxia or hypercapnia (CMS/HCC): new and requires workup  Allergic reaction to alpha-gal: established and worsening  Diagnosis management comments: Medical decision making.  Patient IV established placed on a cardiac monitor.  He started IV normal saline 30 mill per kilo's.  Patient's vital signs and blood pressure remained stable at this point.  Labs are sent blood gas obtained.  EKG was a sinus rhythm without acute findings.  Case 7.5 PCO2 22 PO2 of 114 on 2 L of oxygen.  The patient lactate was 6.5 cultures obtained white count was normal Tylenol aspirin negative drug screen negative.  The patient had alcohol 0.164 Tylenol and aspirin negative urine chemistries unremarkable chest x-ray #1 unremarkable chest x-ray #2 ET tube in good position.  Scanned CT head without without acute findings per  radiology reading CT scan of the chest PE protocol was negative per radiology acute readings.  CT abdomen pelvis had some bilateral avascular necrosis of the femoral heads but otherwise no acute findings.  Patient remained stable he was starting to wake up he had been sedated with propofol.  He is initially been paralyzed with Norcuron to go get CT scans.  On repeat examination at 11:10 PM he started to move around his eyes were open there was no focal findings he was hemodynamically stable.  Patient had been given Solu-Medrol as well and Pepcid 20 mg IV.  See no evidence of acute myocardial infarction acute stroke DVT pulmonary embolism or dissection lactate was elevated but I find no other source of infection he has had no pneumonia his urine is clean.  There is no obvious cellulitis swelling at this point.  But he had been given a dose of cefepime.  He has had no hypotension.  Sats are 100%.  Made aware of the findings.  He will be admitted to ICU to ICU intensivist nurse practitioner notified.  Patient currently stable.  Critical care 30 minutes labs EKG reviewed by me CT reports reviewed by me.  Patient has no other hives or edema there was no airway edema on inspection when intubated.  He had also given Pepcid 20 mg IV       Amount and/or Complexity of Data Reviewed  Clinical lab tests: reviewed  Tests in the radiology section of CPT®: reviewed  Discuss the patient with other providers: yes  Independent visualization of images, tracings, or specimens: yes    Risk of Complications, Morbidity, and/or Mortality  Presenting problems: high  Diagnostic procedures: high    Critical Care  Total time providing critical care: 30-74 minutes    Patient Progress  Patient progress: stable      Final diagnoses:   Acute respiratory failure, unspecified whether with hypoxia or hypercapnia (CMS/[facility])   Allergic reaction to alpha-gal       ED Disposition  ED Disposition     ED Disposition Condition Comment    Decision to Admit   Level of Care: Critical Care [6]   Admitting Physician: PANKAJ ALAN [[id number]]   Attending Physician: PANKAJ ALAN [[id number]]            No follow-up provider specified.       Medication List      ASK your doctor about these medications    venlafaxine 225 MG tablet sustained-release 24 hour 24 hr tablet  Ask about: Which instructions should I use?             Nestor Hawk MD  08/01/21 0009

## 2021-08-01 NOTE — PLAN OF CARE
Goal Outcome Evaluation:         Patient was extubated at shift change this morning. Patient was doing well and was alert and oriented after extubation. Patient still had irritation in his throat and couldn't swallow without coughing. Patient started complaining of a headache and that he couldn't breath. Provider was notified and came to the bedside to assess the patient. Meds were given in attempt to reduce complications, but ultimately the decision to reintubate for protection of airway was made. Patient required multiple pushes and a paralytic push for successful intubation. Patient has been restrained and sedated since intubation. Family has been at bedside and has been updated and informed of patient condition and all new things that have occurred throughout the day. Patient is currently stable on versed, fentanyl, and propofol drips. Tube feeds were started for nutrition.

## 2021-08-01 NOTE — CONSULTS
Nutrition Services    Patient Name: Warren Giraldo  YOB: 1980  MRN: [id number]  Admission date: 7/31/2021    Start cautiously to establish tolerance, using Diabetisource AC @ 35mL/hour + 20mL/hour H2O flush.    This formula contains no mammal-based products, making it a safe product for use with Hx alpha-gal.    PPE Documentation        PPE Worn By Provider Did not enter room - see narrative    PPE Worn By Patient  N/A     CLINICAL NUTRITION ASSESSMENT       Reason for Assessment 8/1: Tube feeding consult      H&P:  Pt admitted after collapsing at home likely due to food allergy and anaphylaxis. Pt with Hx alpha-gal.    Past Medical History:   Diagnosis Date   • Acne varioliformis    • Acute reaction to stress    • Allergic rhinitis    • Allergy to alpha-gal    • Anxiety    • Anxiety    • Costochondritis 03/04/2013   • Depression    • Gastroesophageal reflux disease with esophagitis    • History of kidney stones    • Migraine with aura    • PUD (peptic ulcer disease)        Past Surgical History:   Procedure Laterality Date   • APPENDECTOMY     • CHOLECYSTECTOMY     • HYDROCELE EXCISION / REPAIR Left       Current Problems:   Acute respiratory failure requiring intubation, ?2/2 anaphylaxis from food allergy  --Witnessed respiratory arrest at home  --Possible aspiration pneumonia  --Cardiac arrest? - events at home noted, unclear if patient had cardiac arrest  -Patient intubated 7/31 for airway protection  -extubated 8/1     Rule out sepsis without shock, secondary to possible aspiration pneumonia  -Witnessed emesis prior to arrival and in ED  -Cefepime in place      Gastroesophageal reflux disease with esophagitis  Peptic ulcer disease     Numerous nonobstructing bilateral renal calculi     Anxiety  Depressive disorder       Nutrition/Diet History         Narrative     8/1: Received consult for tube feedings. Pt with Hx alpha-gal and possible anaphylactic food allergy response, which resulted in  "this hospitalization. Pt just extubated this morning, but not yet ready to re-start PO. NG already is in place for nutrition.      Functional Status Currently critically ill; prior to admission was independent for ADLs     Food Allergies Severe reactions to mammal products due to alpha-gal     Factors Affecting   Nutritional Intake Critical illness; needs EN     Anthropometrics        Current Height, Weight Height: 182.9 cm (72\")  Weight: 76.4 kg (168 lb 6.9 oz) (08/01/21 0100)        Admit Height, Weight -    Flowsheet Rows      First Filed Value   Admission Height  182.9 cm (72\") Documented at 08/01/2021 0315   Admission Weight  76.4 kg (168 lb 6.9 oz) Documented at 08/01/2021 0100             Ideal Body Weight (IBW) 178 lb   % Ideal Body Weight 94%       Usual Body Weight UTD   % Usual Body Weight UTD   Wt Change Observation 8/1: Current weight is C/W extended weight Hx per documentation   Weight Hx    Wt Readings from Last 30 Encounters:   08/01/21 0100 76.4 kg (168 lb 6.9 oz)   07/23/21 1352 75.7 kg (166 lb 12.8 oz)   07/21/21 1623 77.1 kg (170 lb)   03/20/20 1851 78.4 kg (172 lb 13.5 oz)   12/17/19 0832 74.8 kg (165 lb)   08/22/19 1031 72.6 kg (160 lb 0.9 oz)   08/22/19 1000 74.8 kg (165 lb)   03/27/19 0000 72.1 kg (159 lb)   03/18/19 0000 72.1 kg (159 lb)   02/11/19 0000 73.6 kg (162 lb 3.2 oz)   07/12/18 0000 73.5 kg (162 lb)   12/23/16 0000 79.4 kg (175 lb)   09/24/16 1221 74.5 kg (164 lb 3.2 oz)   08/16/16 0000 70.8 kg (156 lb)   06/23/16 0000 71.7 kg (158 lb)        BMI kg/m2 Body mass index is 22.84 kg/m².       Labs/Medications         Pertinent Labs -   Results from last 7 days   Lab Units 08/01/21  0446 07/31/21  2130   SODIUM mmol/L 139 143   POTASSIUM mmol/L 4.8 4.2   CHLORIDE mmol/L 105 106   CO2 mmol/L 23.0 20.0*   BUN mg/dL 12 12   CREATININE mg/dL 0.99 1.23   CALCIUM mg/dL 8.3* 9.5   BILIRUBIN mg/dL 0.2 0.3   ALK PHOS U/L 51 53   ALT (SGPT) U/L 21 24   AST (SGOT) U/L 20 23   GLUCOSE mg/dL 129* " "99     Results from last 7 days   Lab Units 08/01/21  0446 07/31/21  2130 07/31/21  2130   MAGNESIUM mg/dL 2.0  --  2.2   PHOSPHORUS mg/dL 3.5  --   --    HEMOGLOBIN g/dL 14.7   < > 16.2   HEMATOCRIT % 43.7   < > 47.9    < > = values in this interval not displayed.     COVID19   Date Value Ref Range Status   07/31/2021 Not Detected Not Detected - Ref. Range Final     No results found for: HGBA1C      Pertinent Medications Benadryl, pepcid, admelog, solu-medrol, seroquel, risperdal, fentanyl, versed    NS @ 50mL/hour      Physical Findings        Overall Physical   Appearance, MSA 8/1: Did not complete NFPE this date    -  Edema  None documented      Gastrointestinal No BM yet this admission     Tubes NG feeding tube in place      Oral/Mouth Cavity WNL     Skin No breakdown documented        Estimated/Assessed Needs       Energy Requirements    Height for Calculation  Height: 182.9 cm (72\")   Weight for Calculation 76.4 kg    Method for Estimation  25-30 kcal/kg   EST Needs (kcal/day) 6148-7178 kcal/day       Protein Requirements    Weight for Calculation 76.4 kg   EST Protein Needs (g/kg) 1.2 g/kg   EST Daily Needs (g/day) 92 g/day       Fluid Requirements     Estimated Needs (mL/day) 1mL/kcal - monitor hydration status        Fluid Deficit    Current Na Level (mEq/L) -   Desired Na Level (mEq/L) -   Estimated Fluid Deficit (L)  -     Current Nutrition Orders & Evaluation of Received Nutrient/Fluid Intake       Oral Nutrition     Current PO Diet NPO Diet   Supplement None    PO Evaluation     % PO Intake NPO since admission    -  Enteral Nutrition    Enteral Route NG tube in place    TF Modular -   TF Delivery Method -   Current Ordered TF  -   Current Ordered H2O flush  -    TF Observation  -   EN Evaluation     TF Changes -    TF Residual -    TF Tolerance -    Average EN Delivered -       Parenteral Nutrition     TPN Route -   Current Ordered TPN VOL  -   Dextrose (g/kcals)  -   Amino Acid (g/kcals) -   Lipids " (mL/Concentration/FREQ)  -   MVI Frequency  -   Trace Element Frequency  -   TPN Observation  -    TPN Evaluation    Total # Days on TPN -   -  Clinical Course    Nutrition Course Details Diets:   NPO since admission     Nutrition support:   TF to begin today 8/1     Nutritional Risk Screening        NRS-2002 Score   -       Nutrition Diagnosis         Nutrition Dx Problem 1 Inadequate oral intake R/t critical illness and ongoing NPO status; as evidenced by clinical course with need for EN.       Nutrition Dx Problem 2 -       Intervention Goal         Intervention Goal(s) Will tolerate EN without difficulty      Nutrition Intervention        RD/Tech Action Will initiate TF today        Nutrition Prescription          Diet Prescription NPO   Supplement Prescription -   -  Enteral Prescription Start cautiously to establish tolerance, using Diabetisource AC @ 35mL/hour + 20mL/hour H2O flush.    END GOAL: Diabetisource AC @ 75mL/hour x 22 hours daily (to allow time off for ADLs) to provide 1980 kcal (100%), 99g PRO (108%), and 1353mL free fluid. Will adjust water flushes per clinical picture.     This formula is free of mammal-based ingredients, appropriate for pt with alpha-gal.       TPN Prescription -     Monitor/Evaluation        Monitor I&O, Pertinent labs, EN delivery/tolerance, Weight, Skin status, GI status, POC/GOC     Electronically signed by:  Juana Camejo RD  08/01/21 15:29 EDT

## 2021-08-01 NOTE — PROCEDURES
Endotracheal Intubation  Date: 8/1/2021  Time: 1350    Indication: Respiratory Failure    Performed as an emergent procedure.  Patient developed sudden, acute respiratory distress.  He initially had stridor however then went silent and was in obvious failure.  He was diaphoretic and distressed.  Decision was made to emergently intubate.  The patient was placed in a flat position. Sedation was obtained using Versed, Fentanyl and Etomidate. The patient was easily ventilated using an ambu bag. The GLIDESCOPE MAC 4 blade was used and inserted into the oropharynx at which time there was a Grade 1 view of the vocal cords.  The vocal cords were nearly swollen shut.  And 8.0 endotracheal tube was inserted with some difficulty and visualized going through the vocal cords. The stylette was removed. Colorimetric change was visualized on the CO2 meter. Breath sounds were heard in bilateral lung fields equally. There was no insufflation auscultated over the epigastrium.  The endotracheal tube was placed at 23 cm, measured at the teeth. Attending physician was available for the entire procedure.   A chest x-ray was ordered to verify endotrachealtube placement.     The patient tolerated the procedure well and there were no immediate complications.

## 2021-08-01 NOTE — PLAN OF CARE
Goal Outcome Evaluation:               Patient on ventilator at 50%/5+. On fentanyl, propofol gtts. Patient restrained and is very agressive when moved or interacted with. NP aware. FC producing well at over 2000cc all night.    Will continue to monitor.

## 2021-08-02 LAB
ALBUMIN SERPL-MCNC: 4.1 G/DL (ref 3.5–5.2)
ALBUMIN/GLOB SERPL: 2.3 G/DL
ALP SERPL-CCNC: 51 U/L (ref 39–117)
ALT SERPL W P-5'-P-CCNC: 20 U/L (ref 1–41)
ANION GAP SERPL CALCULATED.3IONS-SCNC: 9 MMOL/L (ref 5–15)
AST SERPL-CCNC: 19 U/L (ref 1–40)
B BURGDOR IGG+IGM SER-ACNC: <0.91 ISR (ref 0–0.9)
B BURGDOR IGM SER IA-ACNC: <0.8 INDEX (ref 0–0.79)
B MICROTI IGG TITR SER: NORMAL {TITER}
B MICROTI IGM TITR SER: NORMAL {TITER}
BASOPHILS # BLD AUTO: 0 10*3/MM3 (ref 0–0.2)
BASOPHILS NFR BLD AUTO: 0.2 % (ref 0–1.5)
BILIRUB SERPL-MCNC: 0.4 MG/DL (ref 0–1.2)
BUN SERPL-MCNC: 15 MG/DL (ref 6–20)
BUN/CREAT SERPL: 14.4 (ref 7–25)
CALCIUM SPEC-SCNC: 8.8 MG/DL (ref 8.6–10.5)
CHLORIDE SERPL-SCNC: 106 MMOL/L (ref 98–107)
CO2 SERPL-SCNC: 26 MMOL/L (ref 22–29)
CREAT SERPL-MCNC: 1.04 MG/DL (ref 0.76–1.27)
D-LACTATE SERPL-SCNC: 1.2 MMOL/L (ref 0.5–2)
D-LACTATE SERPL-SCNC: 1.9 MMOL/L (ref 0.5–2)
D-LACTATE SERPL-SCNC: 2.3 MMOL/L (ref 0.5–2)
D-LACTATE SERPL-SCNC: 2.7 MMOL/L (ref 0.5–2)
DEPRECATED RDW RBC AUTO: 42.9 FL (ref 37–54)
E CHAFFEENSIS DNA BLD QL NAA+PROBE: NEGATIVE
EOSINOPHIL # BLD AUTO: 0 10*3/MM3 (ref 0–0.4)
EOSINOPHIL NFR BLD AUTO: 0 % (ref 0.3–6.2)
ERYTHROCYTE [DISTWIDTH] IN BLOOD BY AUTOMATED COUNT: 13 % (ref 12.3–15.4)
GFR SERPL CREATININE-BSD FRML MDRD: 80 ML/MIN/1.73
GLOBULIN UR ELPH-MCNC: 1.8 GM/DL
GLUCOSE BLDC GLUCOMTR-MCNC: 113 MG/DL (ref 70–105)
GLUCOSE BLDC GLUCOMTR-MCNC: 131 MG/DL (ref 70–105)
GLUCOSE BLDC GLUCOMTR-MCNC: 156 MG/DL (ref 70–105)
GLUCOSE BLDC GLUCOMTR-MCNC: 161 MG/DL (ref 70–105)
GLUCOSE SERPL-MCNC: 151 MG/DL (ref 65–99)
HCT VFR BLD AUTO: 41.5 % (ref 37.5–51)
HGB BLD-MCNC: 14.1 G/DL (ref 13–17.7)
LYMPHOCYTES # BLD AUTO: 0.3 10*3/MM3 (ref 0.7–3.1)
LYMPHOCYTES NFR BLD AUTO: 3 % (ref 19.6–45.3)
MAGNESIUM SERPL-MCNC: 2.2 MG/DL (ref 1.6–2.6)
MCH RBC QN AUTO: 32.6 PG (ref 26.6–33)
MCHC RBC AUTO-ENTMCNC: 33.9 G/DL (ref 31.5–35.7)
MCV RBC AUTO: 96.3 FL (ref 79–97)
MONOCYTES # BLD AUTO: 0.6 10*3/MM3 (ref 0.1–0.9)
MONOCYTES NFR BLD AUTO: 5.9 % (ref 5–12)
NEUTROPHILS NFR BLD AUTO: 9.1 10*3/MM3 (ref 1.7–7)
NEUTROPHILS NFR BLD AUTO: 90.9 % (ref 42.7–76)
NRBC BLD AUTO-RTO: 0 /100 WBC (ref 0–0.2)
PHOSPHATE SERPL-MCNC: 3.1 MG/DL (ref 2.5–4.5)
PLATELET # BLD AUTO: 237 10*3/MM3 (ref 140–450)
PMV BLD AUTO: 8.4 FL (ref 6–12)
POTASSIUM SERPL-SCNC: 4.8 MMOL/L (ref 3.5–5.2)
PROT SERPL-MCNC: 5.9 G/DL (ref 6–8.5)
R RICKETTSI IGG SER QL IA: NEGATIVE
R RICKETTSI IGM SER-ACNC: 0.22 INDEX (ref 0–0.89)
RBC # BLD AUTO: 4.31 10*6/MM3 (ref 4.14–5.8)
SODIUM SERPL-SCNC: 141 MMOL/L (ref 136–145)
WBC # BLD AUTO: 10.1 10*3/MM3 (ref 3.4–10.8)
WNV IGG SER QL IA: NEGATIVE
WNV IGM SER QL IA: NEGATIVE

## 2021-08-02 PROCEDURE — 94799 UNLISTED PULMONARY SVC/PX: CPT

## 2021-08-02 PROCEDURE — 94003 VENT MGMT INPAT SUBQ DAY: CPT

## 2021-08-02 PROCEDURE — 63710000001 INSULIN LISPRO (HUMAN) PER 5 UNITS: Performed by: NURSE PRACTITIONER

## 2021-08-02 PROCEDURE — 83605 ASSAY OF LACTIC ACID: CPT | Performed by: NURSE PRACTITIONER

## 2021-08-02 PROCEDURE — 25010000002 FENTANYL CITRATE (PF) 50 MCG/ML SOLUTION

## 2021-08-02 PROCEDURE — 25010000002 FONDAPARINUX PER 0.5 MG: Performed by: INTERNAL MEDICINE

## 2021-08-02 PROCEDURE — 25010000002 METHYLPREDNISOLONE PER 125 MG: Performed by: NURSE PRACTITIONER

## 2021-08-02 PROCEDURE — 25010000002 LORAZEPAM PER 2 MG: Performed by: NURSE PRACTITIONER

## 2021-08-02 PROCEDURE — 85025 COMPLETE CBC W/AUTO DIFF WBC: CPT | Performed by: NURSE PRACTITIONER

## 2021-08-02 PROCEDURE — 82962 GLUCOSE BLOOD TEST: CPT

## 2021-08-02 PROCEDURE — 25010000002 DIPHENHYDRAMINE PER 50 MG: Performed by: NURSE PRACTITIONER

## 2021-08-02 PROCEDURE — 25010000002 FENTANYL CITRATE (PF) 2500 MCG/50ML SOLUTION: Performed by: NURSE PRACTITIONER

## 2021-08-02 PROCEDURE — 25010000002 METHYLPREDNISOLONE PER 40 MG: Performed by: NURSE PRACTITIONER

## 2021-08-02 PROCEDURE — 25010000002 MIDAZOLAM PER 1 MG

## 2021-08-02 PROCEDURE — 94660 CPAP INITIATION&MGMT: CPT

## 2021-08-02 PROCEDURE — 25010000002 MIDAZOLAM 50 MG/10ML SOLUTION: Performed by: NURSE PRACTITIONER

## 2021-08-02 PROCEDURE — 83735 ASSAY OF MAGNESIUM: CPT | Performed by: NURSE PRACTITIONER

## 2021-08-02 PROCEDURE — 80053 COMPREHEN METABOLIC PANEL: CPT | Performed by: NURSE PRACTITIONER

## 2021-08-02 PROCEDURE — 84100 ASSAY OF PHOSPHORUS: CPT | Performed by: NURSE PRACTITIONER

## 2021-08-02 RX ORDER — QUETIAPINE FUMARATE 100 MG/1
100 TABLET, FILM COATED ORAL DAILY
Status: DISCONTINUED | OUTPATIENT
Start: 2021-08-02 | End: 2021-08-04 | Stop reason: HOSPADM

## 2021-08-02 RX ORDER — QUETIAPINE FUMARATE 100 MG/1
100 TABLET, FILM COATED ORAL DAILY
COMMUNITY
End: 2022-02-07

## 2021-08-02 RX ORDER — FAMOTIDINE 20 MG/1
40 TABLET, FILM COATED ORAL DAILY
Status: DISCONTINUED | OUTPATIENT
Start: 2021-08-03 | End: 2021-08-04 | Stop reason: HOSPADM

## 2021-08-02 RX ORDER — METHYLPREDNISOLONE SODIUM SUCCINATE 125 MG/2ML
60 INJECTION, POWDER, LYOPHILIZED, FOR SOLUTION INTRAMUSCULAR; INTRAVENOUS EVERY 6 HOURS
Status: DISCONTINUED | OUTPATIENT
Start: 2021-08-02 | End: 2021-08-03

## 2021-08-02 RX ORDER — METHYLPREDNISOLONE SODIUM SUCCINATE 125 MG/2ML
125 INJECTION, POWDER, LYOPHILIZED, FOR SOLUTION INTRAMUSCULAR; INTRAVENOUS EVERY 6 HOURS
Status: DISCONTINUED | OUTPATIENT
Start: 2021-08-02 | End: 2021-08-02

## 2021-08-02 RX ORDER — DIPHENOXYLATE HYDROCHLORIDE AND ATROPINE SULFATE 2.5; .025 MG/1; MG/1
1 TABLET ORAL DAILY
Status: DISCONTINUED | OUTPATIENT
Start: 2021-08-03 | End: 2021-08-04 | Stop reason: HOSPADM

## 2021-08-02 RX ORDER — ETOMIDATE 2 MG/ML
20 INJECTION INTRAVENOUS ONCE
Status: COMPLETED | OUTPATIENT
Start: 2021-08-02 | End: 2021-08-02

## 2021-08-02 RX ORDER — AMMONIA INHALANTS 0.04 G/.3ML
INHALANT RESPIRATORY (INHALATION)
Status: COMPLETED
Start: 2021-08-02 | End: 2021-08-02

## 2021-08-02 RX ORDER — FENTANYL CITRATE 50 UG/ML
INJECTION, SOLUTION INTRAMUSCULAR; INTRAVENOUS
Status: COMPLETED
Start: 2021-08-02 | End: 2021-08-02

## 2021-08-02 RX ORDER — CETIRIZINE HYDROCHLORIDE 10 MG/1
10 TABLET ORAL DAILY
Status: DISCONTINUED | OUTPATIENT
Start: 2021-08-02 | End: 2021-08-04 | Stop reason: HOSPADM

## 2021-08-02 RX ORDER — VENLAFAXINE HYDROCHLORIDE 75 MG/1
225 CAPSULE, EXTENDED RELEASE ORAL
Status: DISCONTINUED | OUTPATIENT
Start: 2021-08-02 | End: 2021-08-04 | Stop reason: HOSPADM

## 2021-08-02 RX ORDER — METHYLPREDNISOLONE SODIUM SUCCINATE 125 MG/2ML
125 INJECTION, POWDER, LYOPHILIZED, FOR SOLUTION INTRAMUSCULAR; INTRAVENOUS ONCE
Status: COMPLETED | OUTPATIENT
Start: 2021-08-02 | End: 2021-08-02

## 2021-08-02 RX ORDER — SUCCINYLCHOLINE CHLORIDE 20 MG/ML
INJECTION INTRAMUSCULAR; INTRAVENOUS
Status: DISPENSED
Start: 2021-08-02 | End: 2021-08-02

## 2021-08-02 RX ORDER — QUETIAPINE FUMARATE 100 MG/1
400 TABLET, FILM COATED ORAL NIGHTLY
Status: DISCONTINUED | OUTPATIENT
Start: 2021-08-02 | End: 2021-08-02

## 2021-08-02 RX ORDER — ETOMIDATE 2 MG/ML
INJECTION INTRAVENOUS
Status: COMPLETED
Start: 2021-08-02 | End: 2021-08-02

## 2021-08-02 RX ORDER — QUETIAPINE FUMARATE 100 MG/1
400 TABLET, FILM COATED ORAL NIGHTLY
Status: DISCONTINUED | OUTPATIENT
Start: 2021-08-02 | End: 2021-08-04 | Stop reason: HOSPADM

## 2021-08-02 RX ORDER — AMMONIA INHALANTS 0.04 G/.3ML
1 INHALANT RESPIRATORY (INHALATION) ONCE
Status: COMPLETED | OUTPATIENT
Start: 2021-08-02 | End: 2021-08-02

## 2021-08-02 RX ORDER — VENLAFAXINE HYDROCHLORIDE 75 MG/1
225 CAPSULE, EXTENDED RELEASE ORAL
Status: DISCONTINUED | OUTPATIENT
Start: 2021-08-03 | End: 2021-08-02

## 2021-08-02 RX ORDER — LORAZEPAM 2 MG/ML
1 INJECTION INTRAMUSCULAR ONCE
Status: COMPLETED | OUTPATIENT
Start: 2021-08-02 | End: 2021-08-02

## 2021-08-02 RX ORDER — FENTANYL CITRATE 50 UG/ML
25 INJECTION, SOLUTION INTRAMUSCULAR; INTRAVENOUS ONCE
Status: COMPLETED | OUTPATIENT
Start: 2021-08-02 | End: 2021-08-02

## 2021-08-02 RX ORDER — LORAZEPAM 1 MG/1
1 TABLET ORAL EVERY 12 HOURS PRN
Status: DISCONTINUED | OUTPATIENT
Start: 2021-08-02 | End: 2021-08-03

## 2021-08-02 RX ORDER — MIDAZOLAM HYDROCHLORIDE 1 MG/ML
INJECTION INTRAMUSCULAR; INTRAVENOUS
Status: COMPLETED
Start: 2021-08-02 | End: 2021-08-02

## 2021-08-02 RX ORDER — QUETIAPINE FUMARATE 100 MG/1
400 TABLET, FILM COATED ORAL NIGHTLY
COMMUNITY
End: 2021-08-06 | Stop reason: SDUPTHER

## 2021-08-02 RX ORDER — FENTANYL CITRATE 50 UG/ML
100 INJECTION, SOLUTION INTRAMUSCULAR; INTRAVENOUS ONCE
Status: COMPLETED | OUTPATIENT
Start: 2021-08-02 | End: 2021-08-02

## 2021-08-02 RX ORDER — MIDAZOLAM HYDROCHLORIDE 1 MG/ML
2 INJECTION INTRAMUSCULAR; INTRAVENOUS ONCE
Status: COMPLETED | OUTPATIENT
Start: 2021-08-02 | End: 2021-08-02

## 2021-08-02 RX ORDER — ALBUTEROL SULFATE 90 UG/1
2 AEROSOL, METERED RESPIRATORY (INHALATION) EVERY 4 HOURS PRN
Status: DISCONTINUED | OUTPATIENT
Start: 2021-08-02 | End: 2021-08-03

## 2021-08-02 RX ADMIN — QUETIAPINE FUMARATE 400 MG: 100 TABLET ORAL at 22:34

## 2021-08-02 RX ADMIN — DEXMEDETOMIDINE HYDROCHLORIDE 1.5 MCG/KG/HR: 100 INJECTION, SOLUTION INTRAVENOUS at 10:10

## 2021-08-02 RX ADMIN — INSULIN LISPRO 2 UNITS: 100 INJECTION, SOLUTION INTRAVENOUS; SUBCUTANEOUS at 05:47

## 2021-08-02 RX ADMIN — MIDAZOLAM HYDROCHLORIDE 2 MG: 1 INJECTION INTRAMUSCULAR; INTRAVENOUS at 10:25

## 2021-08-02 RX ADMIN — DOXYCYCLINE 100 MG: 100 TABLET, FILM COATED ORAL at 22:34

## 2021-08-02 RX ADMIN — METHYLPREDNISOLONE SODIUM SUCCINATE 125 MG: 125 INJECTION, POWDER, FOR SOLUTION INTRAMUSCULAR; INTRAVENOUS at 08:39

## 2021-08-02 RX ADMIN — CETIRIZINE HYDROCHLORIDE 10 MG: 10 TABLET, FILM COATED ORAL at 16:03

## 2021-08-02 RX ADMIN — METHYLPREDNISOLONE SODIUM SUCCINATE 40 MG: 40 INJECTION, POWDER, FOR SOLUTION INTRAMUSCULAR; INTRAVENOUS at 01:06

## 2021-08-02 RX ADMIN — FENTANYL CITRATE 25 MCG: 50 INJECTION, SOLUTION INTRAMUSCULAR; INTRAVENOUS at 09:33

## 2021-08-02 RX ADMIN — FAMOTIDINE 20 MG: 10 INJECTION INTRAVENOUS at 08:32

## 2021-08-02 RX ADMIN — DIPHENHYDRAMINE HYDROCHLORIDE 25 MG: 50 INJECTION, SOLUTION INTRAMUSCULAR; INTRAVENOUS at 08:32

## 2021-08-02 RX ADMIN — RACEPINEPHRINE HYDROCHLORIDE 0.5 ML: 11.25 SOLUTION RESPIRATORY (INHALATION) at 09:28

## 2021-08-02 RX ADMIN — Medication 10 ML: at 20:36

## 2021-08-02 RX ADMIN — AMMONIA INHALANTS 1 EACH: 0.04 INHALANT RESPIRATORY (INHALATION) at 10:25

## 2021-08-02 RX ADMIN — LORAZEPAM 1 MG: 1 TABLET ORAL at 14:22

## 2021-08-02 RX ADMIN — DIPHENHYDRAMINE HYDROCHLORIDE 25 MG: 50 INJECTION, SOLUTION INTRAMUSCULAR; INTRAVENOUS at 14:22

## 2021-08-02 RX ADMIN — MIDAZOLAM 6 MG/HR: 5 INJECTION INTRAMUSCULAR; INTRAVENOUS at 01:06

## 2021-08-02 RX ADMIN — DOXYCYCLINE 100 MG: 100 TABLET, FILM COATED ORAL at 08:32

## 2021-08-02 RX ADMIN — DIPHENHYDRAMINE HYDROCHLORIDE 25 MG: 50 INJECTION, SOLUTION INTRAMUSCULAR; INTRAVENOUS at 01:06

## 2021-08-02 RX ADMIN — DIPHENHYDRAMINE HYDROCHLORIDE 25 MG: 50 INJECTION, SOLUTION INTRAMUSCULAR; INTRAVENOUS at 20:36

## 2021-08-02 RX ADMIN — METHYLPREDNISOLONE SODIUM SUCCINATE 60 MG: 125 INJECTION, POWDER, FOR SOLUTION INTRAMUSCULAR; INTRAVENOUS at 22:34

## 2021-08-02 RX ADMIN — FENTANYL CITRATE 100 MCG: 50 INJECTION, SOLUTION INTRAMUSCULAR; INTRAVENOUS at 10:27

## 2021-08-02 RX ADMIN — MIDAZOLAM 2 MG: 1 INJECTION INTRAMUSCULAR; INTRAVENOUS at 10:25

## 2021-08-02 RX ADMIN — METHYLPREDNISOLONE SODIUM SUCCINATE 40 MG: 40 INJECTION, POWDER, FOR SOLUTION INTRAMUSCULAR; INTRAVENOUS at 08:32

## 2021-08-02 RX ADMIN — BUDESONIDE 0.5 MG: 0.5 SUSPENSION RESPIRATORY (INHALATION) at 19:05

## 2021-08-02 RX ADMIN — FENTANYL CITRATE 260 MCG/HR: 50 INJECTION, SOLUTION INTRAMUSCULAR; INTRAVENOUS at 02:23

## 2021-08-02 RX ADMIN — IPRATROPIUM BROMIDE AND ALBUTEROL SULFATE 3 ML: 2.5; .5 SOLUTION RESPIRATORY (INHALATION) at 19:10

## 2021-08-02 RX ADMIN — FENTANYL CITRATE 200 MCG/HR: 50 INJECTION, SOLUTION INTRAMUSCULAR; INTRAVENOUS at 06:31

## 2021-08-02 RX ADMIN — FONDAPARINUX SODIUM 2.5 MG: 2.5 INJECTION SUBCUTANEOUS at 16:02

## 2021-08-02 RX ADMIN — Medication 10 ML: at 08:33

## 2021-08-02 RX ADMIN — DEXMEDETOMIDINE HYDROCHLORIDE 1 MCG/KG/HR: 100 INJECTION, SOLUTION INTRAVENOUS at 02:06

## 2021-08-02 RX ADMIN — ETOMIDATE 20 MG: 2 INJECTION INTRAVENOUS at 10:27

## 2021-08-02 RX ADMIN — INSULIN LISPRO 2 UNITS: 100 INJECTION, SOLUTION INTRAVENOUS; SUBCUTANEOUS at 00:50

## 2021-08-02 RX ADMIN — IPRATROPIUM BROMIDE AND ALBUTEROL SULFATE 3 ML: 2.5; .5 SOLUTION RESPIRATORY (INHALATION) at 07:53

## 2021-08-02 RX ADMIN — BUDESONIDE 0.5 MG: 0.5 SUSPENSION RESPIRATORY (INHALATION) at 07:53

## 2021-08-02 RX ADMIN — METHYLPREDNISOLONE SODIUM SUCCINATE 60 MG: 125 INJECTION, POWDER, FOR SOLUTION INTRAMUSCULAR; INTRAVENOUS at 16:03

## 2021-08-02 RX ADMIN — QUETIAPINE FUMARATE 100 MG: 100 TABLET ORAL at 16:02

## 2021-08-02 RX ADMIN — LORAZEPAM 1 MG: 2 INJECTION INTRAMUSCULAR; INTRAVENOUS at 20:35

## 2021-08-02 RX ADMIN — INSULIN LISPRO 2 UNITS: 100 INJECTION, SOLUTION INTRAVENOUS; SUBCUTANEOUS at 18:18

## 2021-08-02 RX ADMIN — VENLAFAXINE HYDROCHLORIDE 225 MG: 75 CAPSULE, EXTENDED RELEASE ORAL at 16:02

## 2021-08-02 RX ADMIN — ETOMIDATE 20 MG: 40 INJECTION, SOLUTION INTRAVENOUS at 10:27

## 2021-08-02 NOTE — PLAN OF CARE
Goal Outcome Evaluation:  Plan of Care Reviewed With: patient        Progress: improving     Problem: Adult Inpatient Plan of Care  Goal: Plan of Care Review  Outcome: Unable to Meet, Plan Revised  Flowsheets (Taken 8/2/2021 6254)  Progress: improving  Plan of Care Reviewed With: patient  Goal: Patient-Specific Goal (Individualized)  Outcome: Unable to Meet, Plan Revised  Goal: Absence of Hospital-Acquired Illness or Injury  Outcome: Unable to Meet, Plan Revised  Goal: Optimal Comfort and Wellbeing  Outcome: Unable to Meet, Plan Revised  Goal: Readiness for Transition of Care  Outcome: Unable to Meet, Plan Revised     Problem: Skin Injury Risk Increased  Goal: Skin Health and Integrity  Outcome: Unable to Meet, Plan Revised     Problem: Fall Injury Risk  Goal: Absence of Fall and Fall-Related Injury  Outcome: Unable to Meet, Plan Revised     Problem: Restraint, Nonbehavioral (Nonviolent)  Goal: Discontinuation Criteria Achieved  Outcome: Unable to Meet, Plan Revised  Goal: Personal Dignity and Safety Maintained  Outcome: Unable to Meet, Plan Revised     Problem: Aspiration (Enteral Nutrition)  Goal: Absence of Aspiration Signs and Symptoms  Outcome: Unable to Meet, Plan Revised     Problem: Device-Related Complication Risk (Enteral Nutrition)  Goal: Safe, Effective Therapy Delivery  Outcome: Unable to Meet, Plan Revised     Problem: Feeding Intolerance (Enteral Nutrition)  Goal: Feeding Tolerance  Outcome: Unable to Meet, Plan Revised

## 2021-08-02 NOTE — CASE MANAGEMENT/SOCIAL WORK
Discharge Planning Assessment   [facility]     Patient Name: Warren Giraldo  MRN: [id number]  Today's Date: 8/2/2021    Admit Date: 7/31/2021    Discharge Needs Assessment     Row Name 08/02/21 1403       Living Environment    Lives With  spouse    Current Living Arrangements  home/apartment/condo    Quality of Family Relationships  supportive    Able to Return to Prior Arrangements  yes       Transition Planning    Patient/Family Anticipates Transition to  home with family    Transportation Anticipated  family or friend will provide       Discharge Needs Assessment    Readmission Within the Last 30 Days  no previous admission in last 30 days    Equipment Currently Used at Home  none        Discharge Plan     Row Name 08/02/21 1406       Plan    Plan  d/c Plan : Anticipate routine to home with family    Plan Comments  Pt is off the vent , had a allergic reaction to something he ate , Pt has a psych hx .        Continued Care and Services - Admitted Since 7/31/2021    Coordination has not been started for this encounter.         Demographic Summary     Row Name 08/02/21 1403       General Information    Admission Type  inpatient    Arrived From  emergency department        Functional Status     Row Name 08/02/21 1403       Functional Status    Usual Activity Tolerance  good    Current Activity Tolerance  moderate       Mental Status    General Appearance WDL  WDL       Mental Status Summary    Recent Changes in Mental Status/Cognitive Functioning  mental status                Lydia Mclaughlin RN

## 2021-08-02 NOTE — PROGRESS NOTES
PULMONARY/CRITICAL CARE PROGRESS NOTE       NAME:     Warren Giraldo   AGE:     40 y.o.   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    Respiratory arrest (CMS/[facility])    Aspiration pneumonia (CMS/[facility])    Sepsis without septic shock (CMS/[facility])       Multidisciplinary Rounds:  -self-extubated  -So far tolerating nasal cannula  -Sedation off    Assessment and plan  Acute respiratory failure requiring intubation, ?2/2 anaphylaxis from food allergy  Witnessed respiratory arrest at home  Possible aspiration pneumonia  ?Cardiac arrest  -Events at home noted, unclear if patient had cardiac arrest  -Patient intubated for airway protection  -Planned extubation  however failed after 6 hours and required reintubation  -Self extubated   -Wean support to keep O2 saturation > 91%  -Continue Benadryl and Pepcid  -Continue systemic steroids  -Duoneb  -Pulmicort     Rule out sepsis without shock, secondary to possible aspiration pneumonia  -Witnessed emesis prior to arrival and in ED  -IVF bolus 30 mL/kg per sepsis protocol given in ED  -Continue IVFs  -Lactic Acid 2.2, monitor and trend labs until normalized.  -Blood cultures-pending  -UA-negative  -Procalcitonin-not elevated, CRP-not elevated  -Received cefepime empirically.  Will discontinue and monitor off antibiotics     Gastroesophageal reflux disease with esophagitis  Peptic ulcer disease  -Continue home sucralfate when clinically appropriate  -Continue home Pepcid when clinically appropriate     Numerous nonobstructing bilateral renal calculi  -Osorio catheter placed in ED  -Continue fluids  -Renal ultrasound  reviewed, nonobstructing stone in the right kidney  -Consider nephrology consult if needed     Anxiety  Depressive disorder  -Continue home Seroquel when clinically appropriate  -Continue home Lamictal when clinically appropriate  -Continue home venlafaxine when clinically appropriate     Code Status: FULL  VTE Prophylaxis: SCDs, Arixtra  PUD Prophylaxis: Pepcid      [place] & SUBJECTIVE   Patient presented to [facility] on 7/31/2021 via EMS after collapse at home.  Patient has severe allergies, and wife was at home when patient collapsed.  Per wife patient was not breathing and she was unsure if she felt a pulse.  She gave patient epi and attempted to deliver breaths, however did not feel like it was working.  She began chest compressions while on the phone with EMS.  Patient has beef and pork allergy, and had chicken salad today with which wife believes there may have been some cross-contamination.  Wife states that this is happened before, with most recent allergic reaction happening roughly 2 weeks ago however not this severe.  When EMS arrived patient was found to have a pulse and was moving around although not following commands with vomiting.  Patient was given additional Benadryl IV and Zofran in route.  Wife states that EMS attempted to intubate patient however they were unsuccessful.   Patient was given sepsis bolus in ED and was intubated in ED for airway protection.     In the ED, labs were obtained with abnormalities as follows: Glucose 110, ionized calcium 0.83, PTT 20.6.  ABG: pH 7.525, PCO2 22.6, PO2 114.6, HCO3 18.7.  UA negative for UTI.  CXR was negative for acute cardiopulmonary normalities.  CT head without contrast was negative for acute intracranial abnormality.  CT abdomen pelvis showed stomach distention with air.  Double areas of cortical hypoenhancement involving both kidneys, which could be related to hypotension, acute tubular necrosis, or polynephritis.  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    REVIEW OF SYSTEMS:  8/1: intubated, awake and agitated  8/2: Self extubated this morning.  No shortness of air.  Patient remains anxious with all sedation off.    MEDICATIONS     SCHEDULED MEDICATIONS:   budesonide, 0.5 mg, Nebulization, BID - RT  diphenhydrAMINE, 25 mg, Intravenous, Q6H  doxycycline, 100 mg, Oral, Q12H  famotidine, 20 mg, Intravenous, BID  fondaparinux, 2.5 mg, Subcutaneous, Q24H  insulin lispro, 0-7 Units, Subcutaneous, Q6H  ipratropium-albuterol, 3 mL, Nebulization, Q6H - RT  methylPREDNISolone sodium succinate, 125 mg, Intravenous, Q6H  QUEtiapine, 50 mg, Nasogastric, Nightly  risperiDONE, 0.25 mg, Oral, Q12H  sodium chloride, 10 mL, Intravenous, Q12H        CONTINUOUS INFUSIONS:   dexmedetomidine, 0.2-1.5 mcg/kg/hr, Last Rate: 1 mcg/kg/hr (08/02/21 0206)  fentanyl 10 mcg/mL,  mcg/hr, Last Rate: 200 mcg/hr (08/02/21 0631)  midazolam, 1-10 mg/hr, Last Rate: 5 mg/hr (08/02/21 0549)  propofol, 5-50 mcg/kg/min, Last Rate: Stopped (08/02/21 0401)  sodium chloride, 50 mL/hr, Last Rate: 50 mL/hr (08/01/21 1701)        PRN MEDS:    acetaminophen **OR** acetaminophen    dextrose    dextrose    glucagon (human recombinant)    insulin lispro **AND** insulin lispro    nitroglycerin    ondansetron **OR** ondansetron    [COMPLETED] Insert peripheral IV **AND** sodium chloride    sodium chloride    vecuronium    OBJECTIVE     VITAL SIGNS:  /86   Pulse 86   Temp 97.2 °F (36.2 °C)   Resp 16   Ht 182.9 cm (72\")   Wt 73.3 kg (161 lb 9.6 oz)   SpO2 99%   BMI 21.92 kg/m²     I/O FROM PREVIOUS 3 SHIFTS:  I/O last 3 completed shifts:  In: 5476 [I.V.:1716; Other:222; NG/GT:267; IV Piggyback:3271]  Out: 4550 [Urine:4550]      I/O PREVIOUS 24 HOURS:   Intake/Output                         07/31/21 0701 - 08/01/21 0700 08/01/21 0701 - 08/02/21 0700     [id number] 5981-5336 Total " 7282-7585 1834-7572 Total                 Intake    P.O.  --  0 0  --  -- --    I.V.  --  36 36  301  1379 1680    Other  --  -- --  --  222 222    Flush/ Irrigation Intake (mL) (NG/OG Tube Nasogastric Right nostril) -- -- -- -- 222 222    NG/GT  --  -- --  --  267 267    IV Piggyback  --  3271 3271  --  -- --    Total Intake -- 3307 3307 301 1868 2169       Output    Urine  --  2600 2600  1100  850 1950    Total Output -- 2600 2600 9275 040 3628             NET BALANCE SINCE ADMISSION:  Net IO Since Admission: 926 mL [08/02/21 0916]     PHYSICAL EXAM:  General Appearance:  Alert, agitated at times  Head:  Normocephalic, without obvious abnormality, atraumatic.   Eyes:  PERRL, conjunctiva/corneas clear, EOM not assessed     Neck:  Supple, no JVD, trachea midline  Lungs:   Coarse but clear throughout, no rhonchi or wheezing, respirations unlabored without accessory muscle use  Chest wall:  Symmetrical chest wall movement  Heart:  Sinus tachycardia, S1 and S2 normal, no murmur, rub or gallop  Abdomen:  Soft, non-distended, bowel sounds active all four quadrants.  Nontender.  No rebound or guarding  Extremities:  No clubbing, no cyanosis or edema  Skin:  No rashes or lesions  Neurologic:   Alert and oriented x3.  No lateralizing deficits      RESULTS     LABS:  Lab Results (last 24 hours)       Procedure Component Value Units Date/Time    Comprehensive Metabolic Panel [[id number]]  (Abnormal) Collected: 08/02/21 0610    Specimen: Blood Updated: 08/02/21 0708     Glucose 151 mg/dL      BUN 15 mg/dL      Creatinine 1.04 mg/dL      Sodium 141 mmol/L      Potassium 4.8 mmol/L      Comment: Slight hemolysis detected by analyzer. Results may be affected.        Chloride 106 mmol/L      CO2 26.0 mmol/L      Calcium 8.8 mg/dL      Total Protein 5.9 g/dL      Albumin 4.10 g/dL      ALT (SGPT) 20 U/L      AST (SGOT) 19 U/L      Alkaline Phosphatase 51 U/L      Total Bilirubin 0.4 mg/dL      eGFR Non African Amer 80 mL/min/1.73       Globulin 1.8 gm/dL      A/G Ratio 2.3 g/dL      BUN/Creatinine Ratio 14.4     Anion Gap 9.0 mmol/L     Narrative:      GFR Normal >60  Chronic Kidney Disease <60  Kidney Failure <15      Phosphorus [[id number]]  (Normal) Collected: 08/02/21 0610    Specimen: Blood Updated: 08/02/21 0708     Phosphorus 3.1 mg/dL     Magnesium [[id number]]  (Normal) Collected: 08/02/21 0610    Specimen: Blood Updated: 08/02/21 0708     Magnesium 2.2 mg/dL     Lactic Acid, Plasma [[id number]]  (Abnormal) Collected: 08/02/21 0610    Specimen: Blood Updated: 08/02/21 0700     Lactate 2.3 mmol/L     CBC & Differential [[id number]]  (Abnormal) Collected: 08/02/21 0610    Specimen: Blood Updated: 08/02/21 0636    Narrative:      The following orders were created for panel order CBC & Differential.  Procedure                               Abnormality         Status                     ---------                               -----------         ------                     CBC Auto Differential[[id number]]        Abnormal            Final result                 Please view results for these tests on the individual orders.    CBC Auto Differential [[id number]]  (Abnormal) Collected: 08/02/21 0610    Specimen: Blood Updated: 08/02/21 0636     WBC 10.10 10*3/mm3      RBC 4.31 10*6/mm3      Hemoglobin 14.1 g/dL      Hematocrit 41.5 %      MCV 96.3 fL      MCH 32.6 pg      MCHC 33.9 g/dL      RDW 13.0 %      RDW-SD 42.9 fl      MPV 8.4 fL      Platelets 237 10*3/mm3      Neutrophil % 90.9 %      Lymphocyte % 3.0 %      Monocyte % 5.9 %      Eosinophil % 0.0 %      Basophil % 0.2 %      Neutrophils, Absolute 9.10 10*3/mm3      Lymphocytes, Absolute 0.30 10*3/mm3      Monocytes, Absolute 0.60 10*3/mm3      Eosinophils, Absolute 0.00 10*3/mm3      Basophils, Absolute 0.00 10*3/mm3      nRBC 0.0 /100 WBC     POC Glucose Once [[id number]]  (Abnormal) Collected: 08/02/21 0522    Specimen: Blood Updated: 08/02/21 0523     Glucose 156 mg/dL      Comment:  Serial Number: [id number]:  [id number]       POC Glucose Once [[id number]]  (Abnormal) Collected: 08/02/21 0041    Specimen: Blood Updated: 08/02/21 0043     Glucose 161 mg/dL      Comment: Serial Number: [id number]:  [id number]       Lactic Acid, Plasma [[id number]]  (Normal) Collected: 08/02/21 0007    Specimen: Blood Updated: 08/02/21 0038     Lactate 1.2 mmol/L     Blood Culture - Blood, Arm, Right [[id number]] Collected: 07/31/21 2232    Specimen: Blood from Arm, Right Updated: 08/01/21 2245     Blood Culture No growth at 24 hours    Blood Culture - Blood, Arm, Left [[id number]] Collected: 07/31/21 2227    Specimen: Blood from Arm, Left Updated: 08/01/21 2245     Blood Culture No growth at 24 hours    Lactic Acid, Plasma [[id number]]  (Normal) Collected: 08/01/21 1831    Specimen: Blood Updated: 08/01/21 1856     Lactate 1.4 mmol/L     POC Glucose Once [[id number]]  (Abnormal) Collected: 08/01/21 1815    Specimen: Blood Updated: 08/01/21 1818     Glucose 119 mg/dL      Comment: Serial Number: [id number]:  [id number]                RADIOLOGY:  XR Chest 1 View    Result Date: 8/1/2021  DATE OF EXAM: 8/1/2021 2:35 PM  PROCEDURE: XR CHEST 1 VW-  INDICATIONS: Acute respiratory failure, endotracheal tube insertion.  COMPARISON: 08/01/2021 at 0439 hours.  TECHNIQUE: Single radiographic view of the chest was obtained.  FINDINGS: The tip of the endotracheal tube is in good position located between the thoracic inlet and aortic knob. There has been placement of a nasogastric tube with its tip terminating in the fundus of the stomach. The heart size is normal. The pulmonary vascular markings are normal. The lungs and pleural spaces are clear of active disease.       1. The endotracheal tube is in good position. 2. Placement of a nasogastric tube with the tip terminating in the fundus of the stomach. 3. No active pulmonary disease.  Electronically Signed By-Dread Donaldson MD On:8/1/2021 2:59 PM This report was  finalized on [id number] by  Dread Donaldson MD.    XR Abdomen KUB    Result Date: 8/1/2021  DATE OF EXAM: 8/1/2021 2:30 PM  PROCEDURE: XR ABDOMEN KUB-  INDICATIONS: Nasogastric tube placement.  COMPARISON: 01/17/2015.  TECHNIQUE: Single radiographic view of the abdomen was obtained.   FINDINGS: The tip of the nasogastric tube terminates in the fundus of the stomach. The pelvis and most of the lower abdomen were excluded from the field-of-view. The visualized bowel gas pattern is unremarkable.      The tip of the nasogastric tube terminates within the fundus of the stomach.  Electronically Signed By-Dread Donaldson MD On:8/1/2021 3:00 PM This report was finalized on [id number] by  Dread Donaldson MD.      ECHOCARDIOGRAM:        I reviewed the patient's new clinical results.    This note has been scribed by me for pulmonary attending physician.      Electronically signed by DOLORES Wiggins, 08/02/21 at 09:16 EDT.     I personally have examined  and interviewed the patient. I have reviewed the history, data, problems, assessment and plan with our NP.  Critical care time in direct medical management (  34 ) minutes  Electronically signed by Samson Roman MD, D,ABS, 08/02/21, 10:06 PM EDT.

## 2021-08-02 NOTE — PROGRESS NOTES
"Nutrition Services    Patient Name: Warren Giraldo  YOB: 1980  MRN: [id number]  Admission date: 7/31/2021      PPE Documentation        PPE Worn By Provider RD did not enter room for this encounter   PPE Worn By Patient  -     PROGRESS NOTE      Encounter Information: Tube feed check on. Pt was re-intubated yesterday evening and self-extubated this AM. Diabetisource documented at 35 mL/hr overnight. Pt may require re-intubation       Labs (reviewed below): Reviewed       GI Function:  Residuals WNL.  No BM documented this admit (x2 days)       Nutrition Intervention: Continue current TF regimen       PO Diet/Supplements: NPO Diet   EN Prescription: Diabetisource AC at 35 mL/hr + 20 mL/hr water flush        Intake/Output:   Intake/Output Summary (Last 24 hours) at 8/2/2021 0942  Last data filed at 8/2/2021 0550  Gross per 24 hour   Intake 2169 ml   Output 1950 ml   Net 219 ml            Height: Height: 182.9 cm (72\")   Weight: Weight: 73.3 kg (161 lb 9.6 oz) (08/02/21 0550)   BMI: Body mass index is 21.92 kg/m².     Results from last 7 days   Lab Units 08/02/21  0610 08/01/21  0446 07/31/21  2130   SODIUM mmol/L 141 139 143   POTASSIUM mmol/L 4.8 4.8 4.2   CHLORIDE mmol/L 106 105 106   CO2 mmol/L 26.0 23.0 20.0*   BUN mg/dL 15 12 12   CREATININE mg/dL 1.04 0.99 1.23   CALCIUM mg/dL 8.8 8.3* 9.5   BILIRUBIN mg/dL 0.4 0.2 0.3   ALK PHOS U/L 51 51 53   ALT (SGPT) U/L 20 21 24   AST (SGOT) U/L 19 20 23   GLUCOSE mg/dL 151* 129* 99     Results from last 7 days   Lab Units 08/02/21  0610 08/01/21  0446 08/01/21 0446 07/31/21  2130 07/31/21  2130   MAGNESIUM mg/dL 2.2  --  2.0  --  2.2   PHOSPHORUS mg/dL 3.1   < > 3.5  --   --    HEMOGLOBIN g/dL 14.1   < > 14.7   < > 16.2   HEMATOCRIT % 41.5   < > 43.7   < > 47.9    < > = values in this interval not displayed.     COVID19   Date Value Ref Range Status   07/31/2021 Not Detected Not Detected - Ref. Range Final     No results found for: HGBA1C    Vitals:    " 08/02/21 0931   BP: 121/80   Pulse: 85   Resp:    Temp: 98.1 °F (36.7 °C)   SpO2: 99%       RD to follow up per protocol.    Electronically signed by:  Helena Meyer RD  08/02/21 09:42 EDT

## 2021-08-02 NOTE — PAYOR COMM NOTE
"AUTHORIZATION PENDING:   PLEASE CALL OR FAX DETERMINATION TO CONTACT BELOW. THANK YOU.        Cristine Barrow RN MSN  /UR  [facility]  [phone] office  [phone] fax  [email or web address]    [facility]  NPI: [id number]  Tax: [phone]            Meera Giraldo (40 y.o. Male)     Date of Birth Social Security Number Address Home Phone MRN    1980  [address] [place] [address] [phone] [id number]    Denominational Marital Status          Religion ([facility])        Admission Date Admission Type Admitting Provider Attending Provider Department, Room/Bed    7/31/21 Emergency Priscilla Newman MD Draw, Azmi, MD [facility] INTENSIVE CARE UNIT, 2303/1    Discharge Date Discharge Disposition Discharge Destination                       Attending Provider: Priscilla Newman MD    Allergies: Bee Venom, Beef-derived Products, Latex, Other, Pork-derived Products    Isolation: None   Infection: None   Code Status: CPR    Ht: 182.9 cm (72\")   Wt: 73.3 kg (161 lb 9.6 oz)    Admission Cmt: None   Principal Problem: None                Active Insurance as of 7/31/2021     Primary Coverage     Payor Plan Insurance Group Employer/Plan Group    [facility] [employer] [facility] adicate timeads [facility] PPO [id number]     Payor Plan Address Payor Plan Phone Number Payor Plan Fax Number Effective Dates    [address] [phone]  1/1/2019 - None Entered    [place] [address]       Subscriber Name Subscriber Birth Date Member ID       MEERA GIRALDO 1980 [id number]                 Emergency Contacts      (Rel.) Home Phone Work Phone Mobile Phone    GARRETT GIRALDO (Spouse) [phone] -- [phone]        08/01/21 0048  Inpatient Admission Once    Completed   Level of Care: Critical Care    Diagnosis: Acute respiratory failure, unspecified whether with hypoxia or hypercapnia (CMS/HCC) [7714924]    Admitting Physician: " PRISCILLA NEWMAN [[id number]]    Attending Physician: PRISCILLA NEWMAN [[id number]]    Certification: I Certify That Inpatient Hospital Services Are Medically Necessary For Greater Than 2 Midnights              Criteria Review   Verified inpatient order: 21 0048           Admitted from the ER after a collapse at home.   Possible anaphylaxis vs cardiac arrest. Has severe pork and beef allergies.   Possible aspiration PNA.  In the ICU on 4 liters NPPV satting 99%.  Vitals on admission: T98.1 - P109 - R30 - /92  CT head and CT chest negative.   NG tube in place.   Labs: WBC 8.0. Lactate 6.5. pH 7.525. PCO2 22.6 PO2 114.6  Meds: IV benadryl. IV solumedrol.   Drips: Precedex, Fentanyl, and Versed  Clinical documentation supports inpatient status.         Systemic or Infectious Condition GRG  Clinical Indications for Admission to Inpatient Care  Hospital admission[A] is needed for appropriate care of the patient because of 1 or more of the following:   Allergic reaction with severe symptoms that persist despite observation care (as appropriate), including 1 or more of the following(40)(41)(56)(57)(58)(59)(60)(61)(62):  Respiratory distress  Hemodynamic instability          History & Physical      Priscilla Newman MD at 21 2330          PULMONARY/CRITICAL CARE HISTORY & PHYSICAL       PATIENT NAME:     Warren Giraldo  :     1980    MRN:     [id number]       ROOM:     [facility] ED      PRIMARY CARE PHYSICIAN:  Baldev Sandy MD    SUBJECTIVE     CHIEF COMPLAINT:   Respiratory arrest, following anaphylaxis    HISTORY OF PRESENT ILLNESS:  Warren Giraldo is a 40 y.o. male  has a past medical history of Acne varioliformis, Acute reaction to stress, Allergic rhinitis, Allergy to alpha-gal, Anxiety, Anxiety, Costochondritis (2013), Depression, Gastroesophageal reflux disease with esophagitis, History of kidney stones, Migraine with aura, and PUD (peptic ulcer disease).   Patient presented [facility] 7/[facility]/2021 via EMS after collapse at home.  Patient has severe allergies, and wife was at home when patient collapsed.  Per wife patient was not breathing and she was unsure if she felt a pulse.  She gave patient epi and attempted to deliver breaths, however did not feel like it was working.  She began chest compressions while on the phone with EMS.  Patient has beef and pork allergy, and had chicken salad today with which wife believes there may have been some cross-contamination.  Wife states that this is happened before, with most recent allergic reaction happening roughly 2 weeks ago however not this severe.  When EMS arrived patient was found to have a pulse and was moving around although not following commands with vomiting.  Patient was given additional Benadryl IV and Zofran in route.  Wife states that EMS attempted to intubate patient however they were unsuccessful.   Patient was given sepsis bolus in ED and was intubated in ED for airway protection.    In the ED, labs were obtained with abnormalities as follows: Glucose 110, ionized calcium 0.83, PTT 20.6.  ABG: pH 7.525, PCO2 22.6, PO2 114.6, HCO3 18.7.  UA negative for UTI.  CXR was negative for acute cardiopulmonary normalities.  CT head without contrast was negative for acute intracranial abnormality.  CT abdomen pelvis showed stomach distention with air.  Double areas of cortical hypoenhancement involving both kidneys, which could be related to hypotension, acute tubular necrosis, or polynephritis.  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    Pulmonary/Intensivist service was contacted for admission to ICU and further evaluation and treatment.      REVIEW OF SYSTEMS:  Review of systems could not be obtained due to   patient intubated.      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  -on vent, continue support to keep O2 saturation > 91%  -CXR Reviewed  -EKG Reviewed  -ABG, monitor as ordered  -Duoneb  -Pulmicort    Rule out sepsis without shock, secondary to possible aspiration pneumonia  -Witnessed emesis prior to arrival and in ED  -IVF bolus 30 mL/kg per sepsis protocol given in ED  -Continue IVFs  -Lactic Acid 2.2, monitor and trend labs until normalized.  -Cultures-pending  -UA-negative  -Procalcitonin-pending, CRP-Pending  -Cefepime; deescalate pending culture results    Gastroesophageal reflux disease with esophagitis  Peptic ulcer disease  -Continue home sucralfate when clinically appropriate  -Continue home Pepcid when clinically appropriate    Numerous nonobstructing bilateral renal calculi  -Osorio catheter placed in ED  -Continue fluids  -Renal ultrasound  -Consider nephrology consult if needed    Anxiety  Depressive disorder  -Continue home Seroquel when clinically appropriate  -Continue home Lamictal when clinically appropriate  -Continue home venlafaxine when clinically appropriate    Code Status: FULL  VTE Prophylaxis: SCDs  PUD Prophylaxis: Pepcid      HOSPITAL MEDICATIONS     SCHEDULED MEDICATIONS:  budesonide, 0.5 mg, Nebulization, BID - RT  chlorhexidine, 15 mL, Mouth/Throat, Q12H  famotidine, 20 mg, Intravenous, BID  ipratropium-albuterol, 3 mL, Nebulization, 4x Daily - RT  midazolam, 2 mg, Intravenous, Once  sodium  chloride, 10 mL, Intravenous, Q12H         CONTINUOUS INFUSIONS:    fentanyl 10 mcg/mL, 100 mcg/hr, Last Rate: 250 mcg/hr (08/01/21 9247)  propofol, 5-50 mcg/kg/min, Last Rate: 50 mcg/kg/min (08/01/21 9913)         PRN MEDICATIONS:   •  acetaminophen **OR** acetaminophen  •  nitroglycerin  •  ondansetron **OR** ondansetron  •  [COMPLETED] Insert peripheral IV **AND** sodium chloride  •  sodium chloride       OBJECTIVE     VITAL SIGNS:  /82   Pulse 104   Resp (!) 30   SpO2 100%     Wt Readings from Last 3 Encounters:   07/23/21 75.7 kg (166 lb 12.8 oz)   07/21/21 77.1 kg (170 lb)   03/20/20 78.4 kg (172 lb 13.5 oz)       INTAKE/OUTPUT:    Intake/Output Summary (Last 24 hours) at 7/31/2021 2330  Last data filed at 7/31/2021 2327  Gross per 24 hour   Intake 3271 ml   Output --   Net 3271 ml       PHYSICAL EXAM:   Constitutional:  Well developed, well nourished, no acute distress, non-toxic appearance   Eyes:  PERRL, conjunctiva normal, EOMI   HENT:  Atraumatic, external ears normal, nose normal, ETT noted oropharynx moist, no pharyngeal exudates. Neck-normal range of motion, no tenderness  Respiratory: Clear to ascultation bilateral, non-labored respirations without accessory muscle use  Cardiovascular:  Normal rate, normal rhythm, no murmurs, no gallops, no rubs   GI:  Soft, nondistended, hypoactive bowel sounds, nontender, no rebound or guarding   :  No costovertebral angle tenderness, Osorio-catheter noted  Musculoskeletal:  No edema, no tenderness, no deformities  Integument:  Well hydrated, no rash   Neurologic: Unable to assess as patient is intubated and sedated, normal motor function, normal sensory function, no gross motor deficits noted   Psychiatric: Unable to assess as patient is intubated and sedated.      HISTORY     HISTORY:  Past Medical History:   Diagnosis Date   • Acne varioliformis    • Acute reaction to stress    • Allergic rhinitis    • Allergy to alpha-gal    • Anxiety    • Anxiety    •  Costochondritis 03/04/2013   • Depression    • Gastroesophageal reflux disease with esophagitis    • History of kidney stones    • Migraine with aura    • PUD (peptic ulcer disease)      Past Surgical History:   Procedure Laterality Date   • APPENDECTOMY     • CHOLECYSTECTOMY     • HYDROCELE EXCISION / REPAIR Left      Family History   Problem Relation Age of Onset   • Diabetes Father         passed away due to complications   • Coronary artery disease Father    • Mental illness Neg Hx    • Alcohol abuse Neg Hx    • Drug abuse Neg Hx      Social History     Socioeconomic History   • Marital status:      Spouse name: Not on file   • Number of children: Not on file   • Years of education: Not on file   • Highest education level: Not on file   Tobacco Use   • Smoking status: Never Smoker   • Smokeless tobacco: Never Used   Substance and Sexual Activity   • Alcohol use: Yes     Alcohol/week: 3.0 standard drinks     Types: 3 Cans of beer per week   • Drug use: Defer   • Sexual activity: Defer        HOME MEDICATIONS:   Prior to Admission medications    Medication Sig Start Date End Date Taking? Authorizing Provider   albuterol sulfate  (90 Base) MCG/ACT inhaler Inhale 2 puffs Every 4 (Four) Hours As Needed for Wheezing. 7/23/21   Gustavo Escobedo Sr., MD   cetirizine (zyrTEC) 10 MG tablet Take 10 mg by mouth Daily.    Nurse aJniya Rivas RN   doxycycline (MONODOX) 100 MG capsule Take 1 capsule by mouth 2 (Two) Times a Day for 14 days. 7/23/21 8/6/21  Gustavo Escobedo Sr., MD   EPINEPHrine (EPIPEN) 0.3 MG/0.3ML solution auto-injector injection Inject 0.3 mg into the appropriate muscle as directed by prescriber. 10/6/17   Nurse Janiya Rivas RN   famotidine (PEPCID) 10 MG tablet Take 10 mg by mouth 2 (Two) Times a Day.    Nurse Janiya Rivas RN   lamoTRIgine (LaMICtal) 100 MG tablet Take 100 mg by mouth Daily.    Nurse Janiya Rivas RN   LORazepam (ATIVAN) 1 MG tablet TK 1 T PO BID PRN 7/29/19    Emergency, Nurse Janiya, RN   multivitamin (ONE-A-DAY MENS PO) Take  by mouth Daily.    Emergency, Nurse Janiya, RN   predniSONE (DELTASONE) 10 MG tablet Start with 4 tablets tomorrow and wean by 1 tablet a day over 4 days 7/21/21   Simone Moulton MD   QUEtiapine XR (SEROQUEL XR) 400 MG 24 hr tablet Take  by mouth Daily.    Emergency, Nurse Janiya, RN   venlafaxine XR (EFFEXOR XR) 150 MG 24 hr capsule Take  by mouth.    Emergency, Nurse Janiya RN     IMMUNIZATIONS:  Immunization History   Administered Date(s) Administered   • Td, Not Adsorbed 12/17/2019        ALLERGIES:  Bee venom, Beef-derived products, Latex, Other, and Pork-derived products      RESULTS     LABS:  Lab Results (last 24 hours)     Procedure Component Value Units Date/Time    Blood Gas, Arterial - [[id number]]  (Abnormal) Collected: 07/31/21 2124    Specimen: Arterial Blood Updated: 07/31/21 2128     Site Right Radial     Rahat's Test Positive     pH, Arterial 7.525 pH units      pCO2, Arterial 22.6 mm Hg      pO2, Arterial 114.6 mm Hg      HCO3, Arterial 18.7 mmol/L      Base Excess, Arterial -2.0 mmol/L      Comment: Serial Number: [id number]:  [id number]        O2 Saturation, Arterial 99.0 %      CO2 Content 19.4 mmol/L      Barometric Pressure for Blood Gas --     Comment: N/A        Modality Cannula     FIO2 40 %      Hemodilution No    POCT Electrolytes +HGB +HCT [[id number]]  (Abnormal) Collected: 07/31/21 2124    Specimen: Blood Updated: 07/31/21 2132     Sodium 144 mmol/L      POC Potassium 4.1 mmol/L      Ionized Calcium 0.83 mmol/L      Comment: Serial Number: [id number]:  [id number]        Glucose 110 mg/dL      Hematocrit 43 %      Hemoglobin 14.7 g/dL     POC Lactate [[id number]]  (Abnormal) Collected: 07/31/21 2124    Specimen: Blood Updated: 07/31/21 2131     Lactate 6.5 mmol/L      Comment: Serial Number: [id number]:  [id number]       POC Glucose Once [[id number]]  (Abnormal) Collected: 07/31/21 2124    Specimen: Blood Updated: 07/31/21  2128     Glucose 110 mg/dL      Comment: Serial Number: [id number]:  [id number]       CBC & Differential [[id number]]  (Normal) Collected: 07/31/21 2130    Specimen: Blood from Arm, Left Updated: 07/31/21 2135    Narrative:      The following orders were created for panel order CBC & Differential.  Procedure                               Abnormality         Status                     ---------                               -----------         ------                     CBC Auto Differential[[id number]]        Normal              Final result                 Please view results for these tests on the individual orders.    Comprehensive Metabolic Panel [[id number]]  (Abnormal) Collected: 07/31/21 2130    Specimen: Blood Updated: 07/31/21 2157     Glucose 99 mg/dL      BUN 12 mg/dL      Creatinine 1.23 mg/dL      Sodium 143 mmol/L      Potassium 4.2 mmol/L      Comment: Slight hemolysis detected by analyzer. Results may be affected.        Chloride 106 mmol/L      CO2 20.0 mmol/L      Calcium 9.5 mg/dL      Total Protein 7.0 g/dL      Albumin 4.70 g/dL      ALT (SGPT) 24 U/L      AST (SGOT) 23 U/L      Alkaline Phosphatase 53 U/L      Total Bilirubin 0.3 mg/dL      eGFR Non African Amer 66 mL/min/1.73      Globulin 2.3 gm/dL      A/G Ratio 2.0 g/dL      BUN/Creatinine Ratio 9.8     Anion Gap 17.0 mmol/L     Narrative:      GFR Normal >60  Chronic Kidney Disease <60  Kidney Failure <15      Protime-INR [[id number]]  (Normal) Collected: 07/31/21 2130    Specimen: Blood Updated: 07/31/21 2153     Protime 11.2 Seconds      INR 1.01    aPTT [[id number]]  (Abnormal) Collected: 07/31/21 2130    Specimen: Blood Updated: 07/31/21 2153     PTT 20.6 seconds     Lipase [[id number]]  (Normal) Collected: 07/31/21 2130    Specimen: Blood Updated: 07/31/21 2157     Lipase 29 U/L     Troponin [[id number]]  (Normal) Collected: 07/31/21 2130    Specimen: Blood Updated: 07/31/21 2157     Troponin T <0.010 ng/mL     Narrative:      Troponin T  "Reference Range:  <= 0.03 ng/mL-   Negative for AMI  >0.03 ng/mL-     Abnormal for myocardial necrosis.  Clinicians would have to utilize clinical acumen, EKG, Troponin and serial changes to determine if it is an Acute Myocardial Infarction or myocardial injury due to an underlying chronic condition.       Results may be falsely decreased if patient taking Biotin.      Procalcitonin [[id number]]  (Normal) Collected: 07/31/21 2130    Specimen: Blood Updated: 07/31/21 2201     Procalcitonin 0.03 ng/mL     Narrative:      As a Marker for Sepsis (Non-Neonates):     1. <0.5 ng/mL represents a low risk of severe sepsis and/or septic shock.  2. >2 ng/mL represents a high risk of severe sepsis and/or septic shock.    As a Marker for Lower Respiratory Tract Infections that require antibiotic therapy:  PCT on Admission     Antibiotic Therapy             6-12 Hrs later  >0.5                          Strongly Recommended            >0.25 - <0.5             Recommended  0.1 - 0.25                  Discouraged                       Remeasure/reassess PCT  <0.1                         Strongly Discouraged         Remeasure/reassess PCT      As 28 day mortality risk marker: \"Change in Procalcitonin Result\" (>80% or <=80%) if Day 0 (or Day 1) and Day 4 values are available. Refer to http://www.[employer]-pct-calculator.com/    Change in PCT <=80 %   A decrease of PCT levels below or equal to 80% defines a positive change in PCT test result representing a higher risk for 28-day all-cause mortality of patients diagnosed with severe sepsis or septic shock.    Change in PCT >80 %   A decrease of PCT levels of more than 80% defines a negative change in PCT result representing a lower risk for 28-day all-cause mortality of patients diagnosed with severe sepsis or septic shock.              Results may be falsely decreased if patient taking Biotin.     Acetaminophen Level [[id number]]  (Normal) Collected: 07/31/21 2130    Specimen: Blood " Updated: 07/31/21 2157     Acetaminophen <5.0 mcg/mL     Narrative:      Acetaminophen Therapeutic Range  5-20 ug/mL      Hours after ingestion            Toxic Value    4 Hours                           150 ug/mL    8 Hours                            70 ug/mL   12 Hours                            40 ug/mL   16 Hours                            20 ug/mL    These values apply to a single ingestion only.     Ethanol [[id number]] Collected: 07/31/21 2130    Specimen: Blood Updated: 07/31/21 2157     Ethanol % 0.164 %     Narrative:      Plasma Ethanol Clinical Symptoms:    ETOH (%)               Clinical Symptom  .01-.05              No apparent influence  .03-.12              Euphoria, Diminished judgment and attention   .09-.25              Impaired comprehension, Muscle incoordination  .18-.30              Confusion, Staggered gait, Slurred speech  .25-.40              Markedly decreased response to stimuli, unable to stand or                        walk, vomitting, sleep or stupor  .35-.50              Comatose, Anesthesia, Subnormal body temperature        Salicylate Level [[id number]]  (Normal) Collected: 07/31/21 2130    Specimen: Blood Updated: 07/31/21 2214     Salicylate <0.3 mg/dL     CK [[id number]]  (Normal) Collected: 07/31/21 2130    Specimen: Blood Updated: 07/31/21 2157     Creatine Kinase 109 U/L     Magnesium [[id number]]  (Normal) Collected: 07/31/21 2130    Specimen: Blood Updated: 07/31/21 2157     Magnesium 2.2 mg/dL     TSH [[id number]]  (Normal) Collected: 07/31/21 2130    Specimen: Blood Updated: 07/31/21 2201     TSH 1.660 uIU/mL     CBC Auto Differential [[id number]]  (Normal) Collected: 07/31/21 2130    Specimen: Blood from Arm, Left Updated: 07/31/21 2135     WBC 8.00 10*3/mm3      RBC 4.96 10*6/mm3      Hemoglobin 16.2 g/dL      Hematocrit 47.9 %      MCV 96.6 fL      MCH 32.6 pg      MCHC 33.7 g/dL      RDW 12.9 %      RDW-SD 43.3 fl      MPV 8.2 fL      Platelets 314 10*3/mm3      Neutrophil %  56.5 %      Lymphocyte % 30.8 %      Monocyte % 10.7 %      Eosinophil % 1.4 %      Basophil % 0.6 %      Neutrophils, Absolute 4.50 10*3/mm3      Lymphocytes, Absolute 2.50 10*3/mm3      Monocytes, Absolute 0.90 10*3/mm3      Eosinophils, Absolute 0.10 10*3/mm3      Basophils, Absolute 0.10 10*3/mm3      nRBC 0.1 /100 WBC     Urinalysis With Culture If Indicated - Urine, Catheter [[id number]]  (Normal) Collected: 07/31/21 2132    Specimen: Urine, Catheter Updated: 07/31/21 2139     Color, UA Yellow     Appearance, UA Clear     pH, UA 6.0     Specific Gravity, UA <=1.005     Glucose, UA Negative     Ketones, UA Negative     Bilirubin, UA Negative     Blood, UA Negative     Protein, UA Negative     Leuk Esterase, UA Negative     Nitrite, UA Negative     Urobilinogen, UA 0.2 E.U./dL    Narrative:      Urine microscopic not indicated.    Urine Drug Screen - Urine, Clean Catch [[id number]]  (Normal) Collected: 07/31/21 2134    Specimen: Urine, Clean Catch Updated: 07/31/21 2259     Amphet/Methamphet, Screen Negative     Barbiturates Screen, Urine Negative     Benzodiazepine Screen, Urine Negative     Cocaine Screen, Urine Negative     Opiate Screen Negative     THC, Screen, Urine Negative     Methadone Screen, Urine Negative     Oxycodone Screen, Urine Negative    Narrative:      Negative Thresholds Per Drugs Screened:    Amphetamines                 500 ng/ml  Barbiturates                 200 ng/ml  Benzodiazepines              100 ng/ml  Cocaine                      300 ng/ml  Methadone                    300 ng/ml  Opiates                      300 ng/ml  Oxycodone                    100 ng/ml  THC                           50 ng/ml    The Normal Value for all drugs tested is negative. This report includes final unconfirmed screening results to be used for medical treatment purposes only. Unconfirmed results must not be used for non-medical purposes such as employment or legal testing. Clinical consideration should be  applied to any drug of abuse test, particularly when unconfirmed results are used.          All urine drugs of abuse requests without chain of custody are for medical screening purposes only.  False positives are possible.      Blood Culture - Blood, Arm, Left [[id number]] Collected: 07/31/21 2227    Specimen: Blood from Arm, Left Updated: 07/31/21 2234    Blood Culture - Blood, Arm, Right [[id number]] Collected: 07/31/21 2232    Specimen: Blood from Arm, Right Updated: 07/31/21 2234            MICRO:  Microbiology Results (last 10 days)     ** No results found for the last 240 hours. **            RADIOLOGY STUDIES:  Imaging Results (Last 72 Hours)     Procedure Component Value Units Date/Time    CT Chest Pulmonary Embolism [[id number]] Collected: 07/31/21 2247     Updated: 07/31/21 2251    Narrative:      Exam: CTA thorax, PE protocol    Date: July 31, 2021    History: Unresponsive, cardiac arrest, respiratory failure    Comparison: None available    Technique: Contiguous axial CT images obtained of the thorax following the uneventful intravenous administration of 100 mL Isovue-370 contrast. Additionally, sagittal, coronal and 3-D reformatted images were obtained. CT dose lowering techniques were   used, to include: automated exposure control, adjustment for patient size, and or use of iterative reconstruction.    Findings:    Thoracic aorta: There is limited characterization of the ascending aorta secondary to motion artifact. There is no aneurysm.    HEART: Normal.    Pulmonary arteries: The pulmonary arteries are reasonably well visualized. There is no filling defect to suggest an acute pulmonary embolus.    Mediastinum: There is a well-positioned endotracheal tube. There is no pathologic mediastinal adenopathy.    Lung parenchyma: There is compressive bibasilar atelectasis. There is no pneumothorax or pleural fluid collection.    Upper abdomen: For evaluation of the abdomen, please see the full dictated report of  the CT study of the abdomen and pelvis.    Osseous structures: No acute fractures are identified.      Impression:      1. No pulmonary embolus.  2. No acute abnormality.    Slot 63    Electronically signed by:  Leodan Topete M.D.    7/31/2021 8:50 PM    CT Head Without Contrast [[id number]] Collected: 07/31/21 2245     Updated: 07/31/21 2249    Narrative:      EXAMINATION: CT HEAD WO CONTRAST    DATE: 7/31/2021 10:10 PM     INDICATION: Unresponsive. Cardiac arrest.     COMPARISON: None available.     TECHNIQUE: Thin section noncontrast axial images were obtained through the head. Coronal reformatted images were created.  CT dose lowering techniques were used, to include: automated exposure control, adjustment for patient size, and or use of iterative   reconstruction    FINDINGS:    No acute intracranial hemorrhage. No acute territorial infarct. Gray-white differentiation is maintained.  No acute territorial infarct. No intracranial mass or mass effect.     No hydrocephalus. Basal cisterns are patent.       Globes and orbits are unremarkable.  Mild-to-moderate mucosal thickening in the right paranasal sinuses. Mastoid air cells are clear.  Calvarium is intact.        Impression:        1.  No acute intracranial abnormality. No findings of diffuse anoxic brain injury. If there is further concern, suggest repeat exam in 24 hours.  2.  Paranasal sinus mucosal thickening.          Electronically signed by:  Neo Carrillo DO    7/31/2021 8:48 PM    XR Chest 1 View [[id number]] Collected: 07/31/21 2236     Updated: 07/31/21 2241    Narrative:      FRONTAL VIEW OF THE CHEST    CLINICAL INDICATION: Altered mental status.    COMPARISON: 7/31/2021.    FINDINGS: No focal consolidation, pleural effusion or pneumothorax. Cardiomediastinal morphology is normal. Osseous structures are unremarkable.      Impression:      No acute cardiopulmonary abnormality.    Electronically signed by:  Ryland Massey M.D.    7/31/2021 8:40 PM    CT  Abdomen Pelvis With Contrast [[id number]] Collected: 07/31/21 2235     Updated: 07/31/21 2240    Narrative:      Exam: CT abdomen and pelvis with contrast    Date: July 31, 2021    History: Unresponsive, abdominal pain    Comparison: August 22, 2019    Technique: Contiguous axial CT images were obtained of the abdomen and pelvis following the uneventful intravenous administration of 100 mL Isovue-370 contrast. Additionally, sagittal and coronal reformatted images were obtained.  CT dose lowering   techniques were used, to include: automated exposure control, adjustment for patient size, and or use of iterative reconstruction.    Findings:    Lung bases: There is compressive atelectasis in the lung bases. The heart is not enlarged.    Liver: The liver is mildly hypodense.    Spleen: Normal.    Pancreas: Normal.    Venograms: Normal.    Gallbladder: The gallbladder is surgically absent.    Kidneys: There are subtle areas of cortical hypoenhancement involving both kidneys. There are numerous nonobstructing bilateral renal calculi. There is no hydronephrosis or obstructive uropathy.    Abdominal mesentery: There is no pathologic abdominal mass or adenopathy.    Bowel loops: There is a moderate to large amount of retained colonic fecal debris. The appendix is surgically absent. The stomach is distended with air, fluid and debris. There is no small bowel obstruction.    CT PELVIS:  There is a Osorio catheter within the urinary bladder.    Abdominal aorta: There is no aneurysm or dissection.    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 There is no evidence of osseous collapse. No acute fractures are identified.      Impression:      1. The stomach is distended with air, fluid and debris. Etiology is uncertain.  2. Subtle areas of cortical hypoenhancement involving both kidneys. This could be related to hypotension, acute tubular necrosis or pyelonephritis.  3.  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  4. Numerous nonobstructing bilateral renal calculi.  5. Constipation.      Slot 63    Electronically signed by:  Leodan Topete M.D.    7/31/2021 8:39 PM    XR Chest 1 View [[id number]] Collected: 07/31/21 2234     Updated: 07/31/21 2236    Narrative:      Exam:  Single view chest    Date: July 31, 2021    History: Respiratory failure, unresponsive    Comparison: Same day    Technique: Single frontal radiograph of the chest was obtained    Findings:    There is a well-positioned endotracheal tube. The heart is not enlarged. Mediastinum and pulmonary vasculature are unremarkable. There is no pneumothorax or sizable pleural fluid collection.      Impression:      Impression:  1. Well-positioned endotracheal tube.    Slot 63      Electronically signed by:  Leodan Topete M.D.    7/31/2021 8:35 PM        I  reviewed the patient's new clinical results.    I discussed the patient's findings and my recommendations with patient, family and nursing staff.  I have discussed plan with on-call attending physician, who agrees with this plan of care.    Appropriate PPE worn during assessment of patient per established guidelines.      Electronically signed by DOLORES Sullivan, 07/31/21, 11:30 PM EDT.       Electronically signed by Priscilla Newman MD at 08/01/21 2336          Emergency Department Notes      Marily Callahan RN at 07/31/21 2200        EMS reports that they received the call for cardiac arrest. On arrival they said patient had pulse. Family at scene states patient had allergic reaction( alpha gal) and gave self epi pen. Later spouse came home and gave patient a 2nd epi pen. At that point she called EMS because she states patient had seizure like activity and then lost pulse. Family reports no drug use. Patient noted unresponsive but vomiting. Patient vitals stable on arrival.      Marily Callahan, FRANSISCO  08/01/21  0629      Electronically signed by Marily Callahan RN at 08/01/21 0629     Nestor Hawk MD at 07/31/21 2105      Procedure Orders    1. Intubation [[id number]] ordered by Nestor Hawk MD               Subjective   Chief complaint history of present illness is all provided by family as patient is not verbally responsive    Chief complaint possible allergic reaction alpha gal trouble breathing    History of present illness this is a 40-year-old male with history of alpha gal who reportedly ate some chicken salad today and then started to feel ill he took some Benadryl but continued to still have some problems he gave himself an EpiPen ultimately his wife states that he collapsed she gave him an additional injection epi and she started CPR because she cannot again respond unclear if he had a pulse or not EMS arrived shortly afterwards and found to have a pulse and he was moving around but not following commands and vomiting.  Patient was given additional Benadryl IV in route and patient was given Zofran in route.  He remained hemodynamically stable moving everything and vomiting but would not speak.  No other history is available family reports no trauma he said his Covid vaccinations and has been no injury.  He states that he has had some previous problems with this alpha gal he recently had been seen at the urgent care and he just finished some steroids yesterday.  No reported illness recently patient did provide any other history at this time.          Review of Systems   Unable to perform ROS: Mental status change       Past Medical History:   Diagnosis Date   • Acne varioliformis    • Acute reaction to stress    • Allergic rhinitis    • Allergy to alpha-gal    • Anxiety    • Anxiety    • Costochondritis 03/04/2013   • Depression    • Gastroesophageal reflux disease with esophagitis    • History of kidney stones    • Migraine with aura    • PUD (peptic ulcer disease)        Allergies   Allergen Reactions   •  Bee Venom Anaphylaxis   • Beef-Derived Products Anaphylaxis     Alpha-gal   • Latex Anaphylaxis   • Other Anaphylaxis     Has Alpha gal so any mammal products   • Pork-Derived Products Anaphylaxis     Alpha-gal         Past Surgical History:   Procedure Laterality Date   • APPENDECTOMY     • CHOLECYSTECTOMY     • HYDROCELE EXCISION / REPAIR Left        Family History   Problem Relation Age of Onset   • Diabetes Father         passed away due to complications   • Coronary artery disease Father    • Mental illness Neg Hx    • Alcohol abuse Neg Hx    • Drug abuse Neg Hx        Social History     Socioeconomic History   • Marital status:      Spouse name: Not on file   • Number of children: Not on file   • Years of education: Not on file   • Highest education level: Not on file   Tobacco Use   • Smoking status: Never Smoker   • Smokeless tobacco: Never Used   Substance and Sexual Activity   • Alcohol use: Yes     Alcohol/week: 3.0 standard drinks     Types: 3 Cans of beer per week   • Drug use: Defer   • Sexual activity: Defer     Prior to Admission medications    Medication Sig Start Date End Date Taking? Authorizing Provider   albuterol sulfate  (90 Base) MCG/ACT inhaler Inhale 2 puffs Every 4 (Four) Hours As Needed for Wheezing. 7/23/21   Gustavo Escobedo Sr., MD   cetirizine (zyrTEC) 10 MG tablet Take 10 mg by mouth Daily.    Emergency, Nurse Janiya RN   doxycycline (MONODOX) 100 MG capsule Take 1 capsule by mouth 2 (Two) Times a Day for 14 days. 7/23/21 8/6/21  Gustavo Escobedo Sr., MD   EPINEPHrine (EPIPEN) 0.3 MG/0.3ML solution auto-injector injection Inject 0.3 mg into the appropriate muscle as directed by prescriber. 10/6/17   Emergency, Nurse Janiya RN   famotidine (PEPCID) 10 MG tablet Take 10 mg by mouth 2 (Two) Times a Day.    Emergency, Nurse Janiya RN   lamoTRIgine (LaMICtal) 100 MG tablet Take 100 mg by mouth Daily.    Emergency, Nurse FRANSISCO Denson   LORazepam (ATIVAN) 1 MG tablet TK 1 T PO BID PRN  7/29/19   Emergency, Nurse FRANSISCO Denson   multivitamin (ONE-A-DAY MENS PO) Take  by mouth Daily.    Emergency, Nurse Janiya RN   predniSONE (DELTASONE) 10 MG tablet Start with 4 tablets tomorrow and wean by 1 tablet a day over 4 days 7/21/21   Simone Moulton MD   QUEtiapine XR (SEROQUEL XR) 400 MG 24 hr tablet Take  by mouth Daily.    Emergency, Nurse Janiya, RN   venlafaxine XR (EFFEXOR XR) 150 MG 24 hr capsule Take  by mouth.    Emergency, Nurse Epic, RN           Objective   Physical Exam  40-year-old male with EMS bagging him but he is moving everything and vomiting.  Patient has this all over him.  Nonbloody.  HEENT no obvious trauma pupils equal round reactive and all extraocular muscles are intact the mouth is otherwise clear he is a nasal trumpet in the right nares.  Neck no JVD no bruits supple no meningeal signs  Lungs good breath sounds bilaterally sats are 100%.  Heart regular without murmur  Abdomen soft without tenderness or masses no obvious trauma  Extremities no edema good and equal pulses upper and lower extremities there is no palp cords or Homans' sign no evidence of DVT.  Neurologic patient is awake but he will not speak at this time he is moving everything he does not follow bands.  Does have some moaning speech Alesia Coma Scale 10  Intubation    Date/Time: 7/31/2021 11:23 PM  Performed by: Nestor Hawk MD  Authorized by: Nestor Hawk MD     Consent:     Consent obtained:  Emergent situation  Pre-procedure details:     Patient status:  Altered mental status    Pretreatment meds: Propofol.    Paralytics:  Succinylcholine  Procedure details:     Preoxygenation:  Nasal cannula    CPR in progress: no      Intubation method:  Oral    Tube size (mm):  7.5    Tube type:  Double lumen    Number of attempts:  1    Cricoid pressure: yes    Placement assessment:     ETT to lip:  23 cm    Tube secured with:  ETT lechuga    Breath sounds:  Absent over the epigastrium and equal    Placement  verification: chest rise, CXR verification, direct visualization, equal breath sounds and ETCO2 detector      CXR findings:  ETT in proper place  Post-procedure details:     Patient tolerance of procedure:  Tolerated well, no immediate complications              ED Course      Results for orders placed or performed during the hospital encounter of 07/31/21   Comprehensive Metabolic Panel    Specimen: Blood   Result Value Ref Range    Glucose 99 65 - 99 mg/dL    BUN 12 6 - 20 mg/dL    Creatinine 1.23 0.76 - 1.27 mg/dL    Sodium 143 136 - 145 mmol/L    Potassium 4.2 3.5 - 5.2 mmol/L    Chloride 106 98 - 107 mmol/L    CO2 20.0 (L) 22.0 - 29.0 mmol/L    Calcium 9.5 8.6 - 10.5 mg/dL    Total Protein 7.0 6.0 - 8.5 g/dL    Albumin 4.70 3.50 - 5.20 g/dL    ALT (SGPT) 24 1 - 41 U/L    AST (SGOT) 23 1 - 40 U/L    Alkaline Phosphatase 53 39 - 117 U/L    Total Bilirubin 0.3 0.0 - 1.2 mg/dL    eGFR Non African Amer 66 >60 mL/min/1.73    Globulin 2.3 gm/dL    A/G Ratio 2.0 g/dL    BUN/Creatinine Ratio 9.8 7.0 - 25.0    Anion Gap 17.0 (H) 5.0 - 15.0 mmol/L   Protime-INR    Specimen: Blood   Result Value Ref Range    Protime 11.2 9.6 - 11.7 Seconds    INR 1.01 0.93 - 1.10   aPTT    Specimen: Blood   Result Value Ref Range    PTT 20.6 (L) 24.0 - 31.0 seconds   Lipase    Specimen: Blood   Result Value Ref Range    Lipase 29 13 - 60 U/L   Urinalysis With Culture If Indicated - Urine, Catheter    Specimen: Urine, Catheter   Result Value Ref Range    Color, UA Yellow Yellow, Straw    Appearance, UA Clear Clear    pH, UA 6.0 5.0 - 8.0    Specific Gravity, UA <=1.005 1.005 - 1.030    Glucose, UA Negative Negative    Ketones, UA Negative Negative    Bilirubin, UA Negative Negative    Blood, UA Negative Negative    Protein, UA Negative Negative    Leuk Esterase, UA Negative Negative    Nitrite, UA Negative Negative    Urobilinogen, UA 0.2 E.U./dL 0.2 - 1.0 E.U./dL   Troponin    Specimen: Blood   Result Value Ref Range    Troponin T <0.010  0.000 - 0.030 ng/mL   Procalcitonin    Specimen: Blood   Result Value Ref Range    Procalcitonin 0.03 0.00 - 0.25 ng/mL   Acetaminophen Level    Specimen: Blood   Result Value Ref Range    Acetaminophen <5.0 0.0 - 30.0 mcg/mL   Ethanol    Specimen: Blood   Result Value Ref Range    Ethanol % 0.164 %   Urine Drug Screen - Urine, Clean Catch    Specimen: Urine, Clean Catch   Result Value Ref Range    Amphet/Methamphet, Screen Negative Negative    Barbiturates Screen, Urine Negative Negative    Benzodiazepine Screen, Urine Negative Negative    Cocaine Screen, Urine Negative Negative    Opiate Screen Negative Negative    THC, Screen, Urine Negative Negative    Methadone Screen, Urine Negative Negative    Oxycodone Screen, Urine Negative Negative   Salicylate Level    Specimen: Blood   Result Value Ref Range    Salicylate <0.3 <=30.0 mg/dL   CK    Specimen: Blood   Result Value Ref Range    Creatine Kinase 109 20 - 200 U/L   Magnesium    Specimen: Blood   Result Value Ref Range    Magnesium 2.2 1.6 - 2.6 mg/dL   TSH    Specimen: Blood   Result Value Ref Range    TSH 1.660 0.270 - 4.200 uIU/mL   CBC Auto Differential    Specimen: Arm, Left; Blood   Result Value Ref Range    WBC 8.00 3.40 - 10.80 10*3/mm3    RBC 4.96 4.14 - 5.80 10*6/mm3    Hemoglobin 16.2 13.0 - 17.7 g/dL    Hematocrit 47.9 37.5 - 51.0 %    MCV 96.6 79.0 - 97.0 fL    MCH 32.6 26.6 - 33.0 pg    MCHC 33.7 31.5 - 35.7 g/dL    RDW 12.9 12.3 - 15.4 %    RDW-SD 43.3 37.0 - 54.0 fl    MPV 8.2 6.0 - 12.0 fL    Platelets 314 140 - 450 10*3/mm3    Neutrophil % 56.5 42.7 - 76.0 %    Lymphocyte % 30.8 19.6 - 45.3 %    Monocyte % 10.7 5.0 - 12.0 %    Eosinophil % 1.4 0.3 - 6.2 %    Basophil % 0.6 0.0 - 1.5 %    Neutrophils, Absolute 4.50 1.70 - 7.00 10*3/mm3    Lymphocytes, Absolute 2.50 0.70 - 3.10 10*3/mm3    Monocytes, Absolute 0.90 0.10 - 0.90 10*3/mm3    Eosinophils, Absolute 0.10 0.00 - 0.40 10*3/mm3    Basophils, Absolute 0.10 0.00 - 0.20 10*3/mm3    nRBC 0.1  0.0 - 0.2 /100 WBC   Blood Gas, Arterial -    Specimen: Arterial Blood   Result Value Ref Range    Site Right Radial     Rahat's Test Positive     pH, Arterial 7.525 (H) 7.350 - 7.450 pH units    pCO2, Arterial 22.6 (L) 35.0 - 48.0 mm Hg    pO2, Arterial 114.6 (H) 83.0 - 108.0 mm Hg    HCO3, Arterial 18.7 (L) 21.0 - 28.0 mmol/L    Base Excess, Arterial -2.0 (L) 0.0 - 3.0 mmol/L    O2 Saturation, Arterial 99.0 (H) 94.0 - 98.0 %    CO2 Content 19.4 (L) 22 - 29 mmol/L    Barometric Pressure for Blood Gas      Modality Cannula     FIO2 40 %    Hemodilution No    POCT Electrolytes +HGB +HCT    Specimen: Blood   Result Value Ref Range    Sodium 144 138 - 146 mmol/L    POC Potassium 4.1 3.5 - 4.5 mmol/L    Ionized Calcium 0.83 (C) 1.15 - 1.33 mmol/L    Glucose 110 (H) 74 - 100 mg/dL    Hematocrit 43 38 - 51 %    Hemoglobin 14.7 12.0 - 17.0 g/dL   POC Lactate    Specimen: Blood   Result Value Ref Range    Lactate 6.5 (C) 0.5 - 2.0 mmol/L   POC Glucose Once    Specimen: Blood   Result Value Ref Range    Glucose 110 (H) 74 - 100 mg/dL     XR Chest 1 View    Result Date: 7/31/2021  No acute cardiopulmonary abnormality. Electronically signed by:  Ryland Massey M.D.  7/31/2021 8:40 PM    Medications   sodium chloride 0.9 % flush 10 mL (has no administration in time range)   propofol (DIPRIVAN) infusion 10 mg/mL 100 mL (25 mcg/kg/min × 75.7 kg Intravenous Rate/Dose Change 7/31/21 0601)   fentaNYL 1000 mcg in 100 mL NS infusion (has no administration in time range)   nitroglycerin (NITROSTAT) SL tablet 0.4 mg (has no administration in time range)   sodium chloride 0.9 % flush 10 mL (has no administration in time range)   sodium chloride 0.9 % flush 10 mL (has no administration in time range)   acetaminophen (TYLENOL) tablet 650 mg (has no administration in time range)     Or   acetaminophen (TYLENOL) suppository 650 mg (has no administration in time range)   ondansetron (ZOFRAN) tablet 4 mg ( Oral Not Given:  See Alt 7/31/21 3826)      Or   ondansetron (ZOFRAN) injection 4 mg (4 mg Intravenous Given 7/31/21 2336)   chlorhexidine (PERIDEX) 0.12 % solution 15 mL (has no administration in time range)   famotidine (PEPCID) injection 20 mg (has no administration in time range)   LORazepam (ATIVAN) injection 1 mg (1 mg Intravenous Given 7/31/21 2125)   sodium chloride 0.9 % bolus 1,000 mL (0 mL Intravenous Stopped 7/31/21 2235)   methylPREDNISolone sodium succinate (SOLU-Medrol) injection 125 mg (125 mg Intravenous Given 7/31/21 2318)   sodium chloride 0.9 % bolus 2,271 mL (0 mL/kg × 75.7 kg Intravenous Stopped 7/31/21 2327)   succinylcholine (ANECTINE) injection 100 mg (100 mg Intravenous Given 7/31/21 2155)   sterile water (preservative free) injection 10 mL (10 mL Injection Given 7/31/21 2205)   vecuronium (NORCURON) injection 10 mg (10 mg Intravenous Given 7/31/21 2205)   cefepime 2 gm IVPB in 100 ml NS (MBP) (2 g Intravenous New Bag 7/31/21 2318)   iopamidol (ISOVUE-370) 76 % injection 100 mL (100 mL Intravenous Given 7/31/21 2224)   famotidine (PEPCID) injection 20 mg (20 mg Intravenous Given 7/31/21 2337)          EKG my interpretation normal sinus rhythm rate 100 normal axis hypertrophy QTC of 439 normal EKG                                  MDM  Number of Diagnoses or Management Options  Acute respiratory failure, unspecified whether with hypoxia or hypercapnia (CMS/HCC): new and requires workup  Allergic reaction to alpha-gal: established and worsening  Diagnosis management comments: Medical decision making.  Patient IV established placed on a cardiac monitor.  He started IV normal saline 30 mill per kilo's.  Patient's vital signs and blood pressure remained stable at this point.  Labs are sent blood gas obtained.  EKG was a sinus rhythm without acute findings.  Case 7.5 PCO2 22 PO2 of 114 on 2 L of oxygen.  The patient lactate was 6.5 cultures obtained white count was normal Tylenol aspirin negative drug screen negative.  The patient had  alcohol 0.164 Tylenol and aspirin negative urine chemistries unremarkable chest x-ray #1 unremarkable chest x-ray #2 ET tube in good position.  Scanned CT head without without acute findings per radiology reading CT scan of the chest PE protocol was negative per radiology acute readings.  CT abdomen pelvis had some bilateral avascular necrosis of the femoral heads but otherwise no acute findings.  Patient remained stable he was starting to wake up he had been sedated with propofol.  He is initially been paralyzed with Norcuron to go get CT scans.  On repeat examination at 11:10 PM he started to move around his eyes were open there was no focal findings he was hemodynamically stable.  Patient had been given Solu-Medrol as well and Pepcid 20 mg IV.  See no evidence of acute myocardial infarction acute stroke DVT pulmonary embolism or dissection lactate was elevated but I find no other source of infection he has had no pneumonia his urine is clean.  There is no obvious cellulitis swelling at this point.  But he had been given a dose of cefepime.  He has had no hypotension.  Sats are 100%.  Made aware of the findings.  He will be admitted to ICU to ICU intensivist nurse practitioner notified.  Patient currently stable.  Critical care 30 minutes labs EKG reviewed by me CT reports reviewed by me.  Patient has no other hives or edema there was no airway edema on inspection when intubated.  He had also given Pepcid 20 mg IV       Amount and/or Complexity of Data Reviewed  Clinical lab tests: reviewed  Tests in the radiology section of CPT®: reviewed  Discuss the patient with other providers: yes  Independent visualization of images, tracings, or specimens: yes    Risk of Complications, Morbidity, and/or Mortality  Presenting problems: high  Diagnostic procedures: high    Critical Care  Total time providing critical care: 30-74 minutes    Patient Progress  Patient progress: stable      Final diagnoses:   Acute respiratory  failure, unspecified whether with hypoxia or hypercapnia (CMS/HCC)   Allergic reaction to alpha-gal       ED Disposition  ED Disposition     ED Disposition Condition Comment    Decision to Admit  Level of Care: Critical Care [6]   Admitting Physician: PRISCILLA NEWMAN [[id number]]   Attending Physician: PRISCILLA NEWMAN [[id number]]            No follow-up provider specified.       Medication List      ASK your doctor about these medications    venlafaxine 225 MG tablet sustained-release 24 hour 24 hr tablet  Ask about: Which instructions should I use?             Nestor Hawk MD  21      Electronically signed by Nestor Hawk MD at 21          Physician Progress Notes (all)      Priscilla Newman MD at 21 0756          PULMONARY/CRITICAL CARE PROGRESS NOTE       NAME:     Warren Giraldo   AGE:     40 y.o.   SEX:  male   : 1980       MRN:       [id number]          RM: [facility] ICU 2303/     ASSESSMENT & PLAN     F/U: Acute respiratory failure    Active Problems:    Anxiety    Depressive disorder    Gastroesophageal reflux disease with esophagitis    PUD (peptic ulcer disease)    Acute respiratory failure requiring reintubation (CMS/HCC)    Mixed anxiety depressive disorder    Anaphylaxis due to food    Respiratory arrest (CMS/HCC)    Aspiration pneumonia (CMS/HCC)    Sepsis without septic shock (CMS/HCC)       Multidisciplinary Rounds:  -Events noted  -intubated, awake and agitated  -sedation discontinued, +leak test  -proceed with extubation    Assessment and plan  Acute respiratory failure requiring intubation, ?2/2 anaphylaxis from food allergy  Witnessed respiratory arrest at home  Possible aspiration pneumonia  ?Cardiac arrest  -Events at home noted, unclear if patient had cardiac arrest  -Patient intubated for airway protection  -Vent settings, ABGs, chest x-ray reviewed  -Wean support to keep O2 saturation > 91%  -Continue Benadryl and Pepcid  -Continue systemic  steroids  -Duoneb  -Pulmicort     Rule out sepsis without shock, secondary to possible aspiration pneumonia  -Witnessed emesis prior to arrival and in ED  -IVF bolus 30 mL/kg per sepsis protocol given in ED  -Continue IVFs  -Lactic Acid 2.2, monitor and trend labs until normalized.  -Blood cultures-pending  -UA-negative  -Procalcitonin-not elevated, CRP-not elevated  -Received cefepime empirically.  Will discontinue and monitor off antibiotics     Gastroesophageal reflux disease with esophagitis  Peptic ulcer disease  -Continue home sucralfate when clinically appropriate  -Continue home Pepcid when clinically appropriate     Numerous nonobstructing bilateral renal calculi  -Osorio catheter placed in ED  -Continue fluids  -Renal ultrasound reviewed, nonobstructing stone in the right kidney  -Consider nephrology consult if needed     Anxiety  Depressive disorder  -Continue home Seroquel when clinically appropriate  -Continue home Lamictal when clinically appropriate  -Continue home venlafaxine when clinically appropriate     Code Status: FULL  VTE Prophylaxis: SCDs, Arixtra  PUD Prophylaxis: Pepcid      [place] & SUBJECTIVE   Patient presented to [facility] on 7/31/2021 via EMS after collapse at home.  Patient has severe allergies, and wife was at home when patient collapsed.  Per wife patient was not breathing and she was unsure if she felt a pulse.  She gave patient epi and attempted to deliver breaths, however did not feel like it was working.  She began chest compressions while on the phone with EMS.  Patient has beef and pork allergy, and had chicken salad today with which wife believes there may have been some cross-contamination.  Wife states that this is happened before, with most recent allergic reaction happening roughly 2 weeks ago however not this severe.  When EMS arrived patient was found to have a pulse and was moving around although not following commands with vomiting.  Patient was given  "additional Benadryl IV and Zofran in route.  Wife states that EMS attempted to intubate patient however they were unsuccessful.   Patient was given sepsis bolus in ED and was intubated in ED for airway protection.     In the ED, labs were obtained with abnormalities as follows: Glucose 110, ionized calcium 0.83, PTT 20.6.  ABG: pH 7.525, PCO2 22.6, PO2 114.6, HCO3 18.7.  UA negative for UTI.  CXR was negative for acute cardiopulmonary normalities.  CT head without contrast was negative for acute intracranial abnormality.  CT abdomen pelvis showed stomach distention with air.  Double areas of cortical hypoenhancement involving both kidneys, which could be related to hypotension, acute tubular necrosis, or polynephritis.  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    REVIEW OF SYSTEMS:  8/1: intubated, awake and agitated    MEDICATIONS     SCHEDULED MEDICATIONS:   budesonide, 0.5 mg, Nebulization, BID - RT  chlorhexidine, 15 mL, Mouth/Throat, Q12H  diphenhydrAMINE, 25 mg, Intravenous, Q6H  famotidine, 20 mg, Intravenous, BID  ipratropium-albuterol, 3 mL, Nebulization, 4x Daily - RT  methylPREDNISolone sodium succinate, 20 mg, Intravenous, Q6H  sodium chloride, 10 mL, Intravenous, Q12H        CONTINUOUS INFUSIONS:   dexmedetomidine, 0.2-1.5 mcg/kg/hr, Last Rate: Stopped (08/01/21 0755)  fentanyl 10 mcg/mL, 100 mcg/hr, Last Rate: Stopped (08/01/21 0729)  propofol, 5-50 mcg/kg/min, Last Rate: Stopped (08/01/21 0729)        PRN MEDS:  •  acetaminophen **OR** acetaminophen  •  nitroglycerin  •  ondansetron **OR** ondansetron  •  [COMPLETED] Insert peripheral IV **AND** sodium chloride  •  sodium chloride    OBJECTIVE     VITAL SIGNS:  /72   Pulse 109   Temp 97.7 °F (36.5 °C)   Resp 26   Ht 182.9 cm (72\")   Wt 76.4 kg (168 lb 6.9 oz)   SpO2 98%   BMI 22.84 kg/m²     I/O FROM PREVIOUS 3 SHIFTS:  I/O last 3 " completed shifts:  In: 3307 [I.V.:36; IV Piggyback:3271]  Out: 2600 [Urine:2600]      I/O PREVIOUS 24 HOURS:   Intake/Output                 07/31/21 0701 - 08/01/21 0700     2920-2745 4067-5596 Total              Intake    P.O.  --  0 0    I.V.  --  36 36    IV Piggyback  --  3271 3271    Total Intake -- 3307 3307       Output    Urine  --  2600 2600    Total Output -- 2600 2600           NET BALANCE SINCE ADMISSION:  Net IO Since Admission: 707 mL [08/01/21 0756]     PHYSICAL EXAM:  General Appearance:  Alert, agitated  Head:  Normocephalic, without obvious abnormality, atraumatic. OETT  Eyes:  PERRL, conjunctiva/corneas clear, EOM not assessed     Neck:  Supple, no JVD, trachea midline  Lungs:   Coarse but clear throughout, no rhonchi or wheezing, respirations unlabored without accessory muscle use  Chest wall:  Symmetrical chest wall movement  Heart:  Sinus tachycardia, S1 and S2 normal, no murmur, rub or gallop  Abdomen:  Soft, non-distended, bowel sounds active all four quadrants  Extremities:  No clubbing, no cyanosis or edema  Skin:  No rashes or lesions  Neurologic:   Alert and agitated. Follows commands.  No lateralizing deficits noted      RESULTS     LABS:  Lab Results (last 24 hours)     Procedure Component Value Units Date/Time    Lactic Acid, Plasma [[id number]]  (Normal) Collected: 08/01/21 0641    Specimen: Blood Updated: 08/01/21 0710     Lactate 1.6 mmol/L     C-reactive Protein [[id number]]  (Normal) Collected: 08/01/21 0446    Specimen: Blood Updated: 08/01/21 0640     C-Reactive Protein <0.30 mg/dL     Procalcitonin [[id number]]  (Normal) Collected: 08/01/21 0446    Specimen: Blood Updated: 08/01/21 0636     Procalcitonin <0.02 ng/mL     Narrative:      As a Marker for Sepsis (Non-Neonates):     1. <0.5 ng/mL represents a low risk of severe sepsis and/or septic shock.  2. >2 ng/mL represents a high risk of severe sepsis and/or septic shock.    As a Marker for Lower Respiratory Tract Infections  "that require antibiotic therapy:  PCT on Admission     Antibiotic Therapy             6-12 Hrs later  >0.5                          Strongly Recommended            >0.25 - <0.5             Recommended  0.1 - 0.25                  Discouraged                       Remeasure/reassess PCT  <0.1                         Strongly Discouraged         Remeasure/reassess PCT      As 28 day mortality risk marker: \"Change in Procalcitonin Result\" (>80% or <=80%) if Day 0 (or Day 1) and Day 4 values are available. Refer to [email or web address]pct-[email or web address]/    Change in PCT <=80 %   A decrease of PCT levels below or equal to 80% defines a positive change in PCT test result representing a higher risk for 28-day all-cause mortality of patients diagnosed with severe sepsis or septic shock.    Change in PCT >80 %   A decrease of PCT levels of more than 80% defines a negative change in PCT result representing a lower risk for 28-day all-cause mortality of patients diagnosed with severe sepsis or septic shock.              Results may be falsely decreased if patient taking Biotin.     Comprehensive Metabolic Panel [[id number]]  (Abnormal) Collected: 08/01/21 0446    Specimen: Blood Updated: 08/01/21 0518     Glucose 129 mg/dL      BUN 12 mg/dL      Creatinine 0.99 mg/dL      Sodium 139 mmol/L      Potassium 4.8 mmol/L      Comment: Slight hemolysis detected by analyzer. Results may be affected.        Chloride 105 mmol/L      CO2 23.0 mmol/L      Calcium 8.3 mg/dL      Total Protein 6.1 g/dL      Albumin 4.30 g/dL      ALT (SGPT) 21 U/L      AST (SGOT) 20 U/L      Alkaline Phosphatase 51 U/L      Total Bilirubin 0.2 mg/dL      eGFR Non African Amer 85 mL/min/1.73      Globulin 1.8 gm/dL      A/G Ratio 2.4 g/dL      BUN/Creatinine Ratio 12.1     Anion Gap 11.0 mmol/L     Narrative:      GFR Normal >60  Chronic Kidney Disease <60  Kidney Failure <15      Magnesium [[id number]]  (Normal) Collected: 08/01/21 0446    Specimen: Blood " Updated: 08/01/21 0518     Magnesium 2.0 mg/dL     Phosphorus [[id number]]  (Normal) Collected: 08/01/21 0446    Specimen: Blood Updated: 08/01/21 0518     Phosphorus 3.5 mg/dL     CBC & Differential [[id number]]  (Abnormal) Collected: 08/01/21 0446    Specimen: Blood Updated: 08/01/21 0500    Narrative:      The following orders were created for panel order CBC & Differential.  Procedure                               Abnormality         Status                     ---------                               -----------         ------                     CBC Auto Differential[[id number]]        Abnormal            Final result                 Please view results for these tests on the individual orders.    CBC Auto Differential [[id number]]  (Abnormal) Collected: 08/01/21 0446    Specimen: Blood Updated: 08/01/21 0500     WBC 8.30 10*3/mm3      RBC 4.53 10*6/mm3      Hemoglobin 14.7 g/dL      Hematocrit 43.7 %      MCV 96.4 fL      MCH 32.5 pg      MCHC 33.7 g/dL      RDW 13.1 %      RDW-SD 43.8 fl      MPV 8.3 fL      Platelets 286 10*3/mm3      Neutrophil % 94.0 %      Lymphocyte % 4.9 %      Monocyte % 0.7 %      Eosinophil % 0.0 %      Basophil % 0.4 %      Neutrophils, Absolute 7.80 10*3/mm3      Lymphocytes, Absolute 0.40 10*3/mm3      Monocytes, Absolute 0.10 10*3/mm3      Eosinophils, Absolute 0.00 10*3/mm3      Basophils, Absolute 0.00 10*3/mm3      nRBC 0.0 /100 WBC     Blood Gas, Arterial - [[id number]]  (Abnormal) Collected: 08/01/21 0433    Specimen: Arterial Blood Updated: 08/01/21 0437     Site Right Radial     Rahat's Test Positive     pH, Arterial 7.281 pH units      pCO2, Arterial 54.8 mm Hg      pO2, Arterial 212.1 mm Hg      HCO3, Arterial 25.8 mmol/L      Base Excess, Arterial -1.9 mmol/L      Comment: Serial Number: [id number]:  [id number]        O2 Saturation, Arterial 99.6 %      CO2 Content 27.5 mmol/L      Barometric Pressure for Blood Gas --     Comment: N/A        Modality Adult Vent     FIO2 50 %       Ventilator Mode ;VS     Set Tidal Volume 400     Rate 22.0000 Breaths/minute      PEEP 5     PIP 11 cmH2O      Hemodilution No     Respiratory Rate 18    STAT Lactic Acid, Reflex [[id number]]  (Abnormal) Collected: 08/01/21 0101    Specimen: Blood Updated: 08/01/21 0128     Lactate 2.2 mmol/L     POC Glucose Once [[id number]]  (Abnormal) Collected: 08/01/21 0103    Specimen: Blood Updated: 08/01/21 0108     Glucose 116 mg/dL      Comment: Serial Number: [id number]:  [id number]       COVID PRE-OP / PRE-PROCEDURE SCREENING ORDER (NO ISOLATION) - Swab, Nasopharynx [[id number]]  (Normal) Collected: 07/31/21 2305    Specimen: Swab from Nasopharynx Updated: 08/01/21 0030    Narrative:      The following orders were created for panel order COVID PRE-OP / PRE-PROCEDURE SCREENING ORDER (NO ISOLATION) - Swab, Nasopharynx.  Procedure                               Abnormality         Status                     ---------                               -----------         ------                     COVID-19,CEPHEID,COR/JENNIFER...[[id number]]  Normal              Final result                 Please view results for these tests on the individual orders.    COVID-19,CEPHEID,COR/JENNIFER/PAD/DIANDRA IN-HOUSE(OR EMERGENT/ADD-ON),NP SWAB IN TRANSPORT MEDIA 3-4 HR TAT, RT-PCR - Swab, Nasopharynx [[id number]]  (Normal) Collected: 07/31/21 2305    Specimen: Swab from Nasopharynx Updated: 08/01/21 0030     COVID19 Not Detected    Narrative:      Fact sheet for providers: https://www.fda.gov/media/257646/download     Fact sheet for patients: https://www.fda.gov/media/078644/download  Fact sheet for providers: https://www.fda.gov/media/812250/download     Fact sheet for patients: https://www.fda.gov/media/826166/download    Urine Drug Screen - Urine, Clean Catch [[id number]]  (Normal) Collected: 07/31/21 2134    Specimen: Urine, Clean Catch Updated: 07/31/21 2259     Amphet/Methamphet, Screen Negative     Barbiturates Screen, Urine Negative      Benzodiazepine Screen, Urine Negative     Cocaine Screen, Urine Negative     Opiate Screen Negative     THC, Screen, Urine Negative     Methadone Screen, Urine Negative     Oxycodone Screen, Urine Negative    Narrative:      Negative Thresholds Per Drugs Screened:    Amphetamines                 500 ng/ml  Barbiturates                 200 ng/ml  Benzodiazepines              100 ng/ml  Cocaine                      300 ng/ml  Methadone                    300 ng/ml  Opiates                      300 ng/ml  Oxycodone                    100 ng/ml  THC                           50 ng/ml    The Normal Value for all drugs tested is negative. This report includes final unconfirmed screening results to be used for medical treatment purposes only. Unconfirmed results must not be used for non-medical purposes such as employment or legal testing. Clinical consideration should be applied to any drug of abuse test, particularly when unconfirmed results are used.          All urine drugs of abuse requests without chain of custody are for medical screening purposes only.  False positives are possible.      Blood Culture - Blood, Arm, Left [[id number]] Collected: 07/31/21 2227    Specimen: Blood from Arm, Left Updated: 07/31/21 2234    Blood Culture - Blood, Arm, Right [[id number]] Collected: 07/31/21 2232    Specimen: Blood from Arm, Right Updated: 07/31/21 2234    Salicylate Level [[id number]]  (Normal) Collected: 07/31/21 2130    Specimen: Blood Updated: 07/31/21 2214     Salicylate <0.3 mg/dL     Procalcitonin [[id number]]  (Normal) Collected: 07/31/21 2130    Specimen: Blood Updated: 07/31/21 2201     Procalcitonin 0.03 ng/mL     Narrative:      As a Marker for Sepsis (Non-Neonates):     1. <0.5 ng/mL represents a low risk of severe sepsis and/or septic shock.  2. >2 ng/mL represents a high risk of severe sepsis and/or septic shock.    As a Marker for Lower Respiratory Tract Infections that require antibiotic therapy:  PCT on  "Admission     Antibiotic Therapy             6-12 Hrs later  >0.5                          Strongly Recommended            >0.25 - <0.5             Recommended  0.1 - 0.25                  Discouraged                       Remeasure/reassess PCT  <0.1                         Strongly Discouraged         Remeasure/reassess PCT      As 28 day mortality risk marker: \"Change in Procalcitonin Result\" (>80% or <=80%) if Day 0 (or Day 1) and Day 4 values are available. Refer to [email or web address][email or web address]/    Change in PCT <=80 %   A decrease of PCT levels below or equal to 80% defines a positive change in PCT test result representing a higher risk for 28-day all-cause mortality of patients diagnosed with severe sepsis or septic shock.    Change in PCT >80 %   A decrease of PCT levels of more than 80% defines a negative change in PCT result representing a lower risk for 28-day all-cause mortality of patients diagnosed with severe sepsis or septic shock.              Results may be falsely decreased if patient taking Biotin.     TSH [[id number]]  (Normal) Collected: 07/31/21 2130    Specimen: Blood Updated: 07/31/21 2201     TSH 1.660 uIU/mL     Comprehensive Metabolic Panel [[id number]]  (Abnormal) Collected: 07/31/21 2130    Specimen: Blood Updated: 07/31/21 2157     Glucose 99 mg/dL      BUN 12 mg/dL      Creatinine 1.23 mg/dL      Sodium 143 mmol/L      Potassium 4.2 mmol/L      Comment: Slight hemolysis detected by analyzer. Results may be affected.        Chloride 106 mmol/L      CO2 20.0 mmol/L      Calcium 9.5 mg/dL      Total Protein 7.0 g/dL      Albumin 4.70 g/dL      ALT (SGPT) 24 U/L      AST (SGOT) 23 U/L      Alkaline Phosphatase 53 U/L      Total Bilirubin 0.3 mg/dL      eGFR Non African Amer 66 mL/min/1.73      Globulin 2.3 gm/dL      A/G Ratio 2.0 g/dL      BUN/Creatinine Ratio 9.8     Anion Gap 17.0 mmol/L     Narrative:      GFR Normal >60  Chronic Kidney Disease <60  Kidney Failure <15      " Troponin [[id number]]  (Normal) Collected: 07/31/21 2130    Specimen: Blood Updated: 07/31/21 2157     Troponin T <0.010 ng/mL     Narrative:      Troponin T Reference Range:  <= 0.03 ng/mL-   Negative for AMI  >0.03 ng/mL-     Abnormal for myocardial necrosis.  Clinicians would have to utilize clinical acumen, EKG, Troponin and serial changes to determine if it is an Acute Myocardial Infarction or myocardial injury due to an underlying chronic condition.       Results may be falsely decreased if patient taking Biotin.      Lipase [[id number]]  (Normal) Collected: 07/31/21 2130    Specimen: Blood Updated: 07/31/21 2157     Lipase 29 U/L     Acetaminophen Level [[id number]]  (Normal) Collected: 07/31/21 2130    Specimen: Blood Updated: 07/31/21 2157     Acetaminophen <5.0 mcg/mL     Narrative:      Acetaminophen Therapeutic Range  5-20 ug/mL      Hours after ingestion            Toxic Value    4 Hours                           150 ug/mL    8 Hours                            70 ug/mL   12 Hours                            40 ug/mL   16 Hours                            20 ug/mL    These values apply to a single ingestion only.     CK [[id number]]  (Normal) Collected: 07/31/21 2130    Specimen: Blood Updated: 07/31/21 2157     Creatine Kinase 109 U/L     Ethanol [[id number]] Collected: 07/31/21 2130    Specimen: Blood Updated: 07/31/21 2157     Ethanol % 0.164 %     Narrative:      Plasma Ethanol Clinical Symptoms:    ETOH (%)               Clinical Symptom  .01-.05              No apparent influence  .03-.12              Euphoria, Diminished judgment and attention   .09-.25              Impaired comprehension, Muscle incoordination  .18-.30              Confusion, Staggered gait, Slurred speech  .25-.40              Markedly decreased response to stimuli, unable to stand or                        walk, vomitting, sleep or stupor  .35-.50              Comatose, Anesthesia, Subnormal body temperature        Magnesium  [[id number]]  (Normal) Collected: 07/31/21 2130    Specimen: Blood Updated: 07/31/21 2157     Magnesium 2.2 mg/dL     Protime-INR [[id number]]  (Normal) Collected: 07/31/21 2130    Specimen: Blood Updated: 07/31/21 2153     Protime 11.2 Seconds      INR 1.01    aPTT [[id number]]  (Abnormal) Collected: 07/31/21 2130    Specimen: Blood Updated: 07/31/21 2153     PTT 20.6 seconds     Urinalysis With Culture If Indicated - Urine, Catheter [[id number]]  (Normal) Collected: 07/31/21 2132    Specimen: Urine, Catheter Updated: 07/31/21 2139     Color, UA Yellow     Appearance, UA Clear     pH, UA 6.0     Specific Gravity, UA <=1.005     Glucose, UA Negative     Ketones, UA Negative     Bilirubin, UA Negative     Blood, UA Negative     Protein, UA Negative     Leuk Esterase, UA Negative     Nitrite, UA Negative     Urobilinogen, UA 0.2 E.U./dL    Narrative:      Urine microscopic not indicated.    CBC & Differential [[id number]]  (Normal) Collected: 07/31/21 2130    Specimen: Blood from Arm, Left Updated: 07/31/21 2135    Narrative:      The following orders were created for panel order CBC & Differential.  Procedure                               Abnormality         Status                     ---------                               -----------         ------                     CBC Auto Differential[[id number]]        Normal              Final result                 Please view results for these tests on the individual orders.    CBC Auto Differential [[id number]]  (Normal) Collected: 07/31/21 2130    Specimen: Blood from Arm, Left Updated: 07/31/21 2135     WBC 8.00 10*3/mm3      RBC 4.96 10*6/mm3      Hemoglobin 16.2 g/dL      Hematocrit 47.9 %      MCV 96.6 fL      MCH 32.6 pg      MCHC 33.7 g/dL      RDW 12.9 %      RDW-SD 43.3 fl      MPV 8.2 fL      Platelets 314 10*3/mm3      Neutrophil % 56.5 %      Lymphocyte % 30.8 %      Monocyte % 10.7 %      Eosinophil % 1.4 %      Basophil % 0.6 %      Neutrophils, Absolute 4.50  10*3/mm3      Lymphocytes, Absolute 2.50 10*3/mm3      Monocytes, Absolute 0.90 10*3/mm3      Eosinophils, Absolute 0.10 10*3/mm3      Basophils, Absolute 0.10 10*3/mm3      nRBC 0.1 /100 WBC     POCT Electrolytes +HGB +HCT [[id number]]  (Abnormal) Collected: 07/31/21 2124    Specimen: Blood Updated: 07/31/21 2132     Sodium 144 mmol/L      POC Potassium 4.1 mmol/L      Ionized Calcium 0.83 mmol/L      Comment: Serial Number: [id number]:  [id number]        Glucose 110 mg/dL      Hematocrit 43 %      Hemoglobin 14.7 g/dL     POC Lactate [[id number]]  (Abnormal) Collected: 07/31/21 2124    Specimen: Blood Updated: 07/31/21 2131     Lactate 6.5 mmol/L      Comment: Serial Number: [id number]:  [id number]       POC Glucose Once [[id number]]  (Abnormal) Collected: 07/31/21 2124    Specimen: Blood Updated: 07/31/21 2128     Glucose 110 mg/dL      Comment: Serial Number: [id number]:  [id number]       Blood Gas, Arterial - [[id number]]  (Abnormal) Collected: 07/31/21 2124    Specimen: Arterial Blood Updated: 07/31/21 2128     Site Right Radial     Rahat's Test Positive     pH, Arterial 7.525 pH units      pCO2, Arterial 22.6 mm Hg      pO2, Arterial 114.6 mm Hg      HCO3, Arterial 18.7 mmol/L      Base Excess, Arterial -2.0 mmol/L      Comment: Serial Number: [id number]:  [id number]        O2 Saturation, Arterial 99.0 %      CO2 Content 19.4 mmol/L      Barometric Pressure for Blood Gas --     Comment: N/A        Modality Cannula     FIO2 40 %      Hemodilution No           RADIOLOGY:  CT Head Without Contrast    Result Date: 7/31/2021  EXAMINATION: CT HEAD WO CONTRAST DATE: 7/31/2021 10:10 PM  INDICATION: Unresponsive. Cardiac arrest.  COMPARISON: None available.  TECHNIQUE: Thin section noncontrast axial images were obtained through the head. Coronal reformatted images were created.  CT dose lowering techniques were used, to include: automated exposure control, adjustment for patient size, and or use of iterative  reconstruction  FINDINGS: No acute intracranial hemorrhage. No acute territorial infarct. Gray-white differentiation is maintained. No acute territorial infarct. No intracranial mass or mass effect. No hydrocephalus. Basal cisterns are patent. Globes and orbits are unremarkable. Mild-to-moderate mucosal thickening in the right paranasal sinuses. Mastoid air cells are clear. Calvarium is intact.     1.  No acute intracranial abnormality. No findings of diffuse anoxic brain injury. If there is further concern, suggest repeat exam in 24 hours. 2.  Paranasal sinus mucosal thickening. Electronically signed by:  Neo Carrillo DO  7/31/2021 8:48 PM    CT Abdomen Pelvis With Contrast    Result Date: 7/31/2021  Exam: CT abdomen and pelvis with contrast Date: July 31, 2021 History: Unresponsive, abdominal pain Comparison: August 22, 2019 Technique: Contiguous axial CT images were obtained of the abdomen and pelvis following the uneventful intravenous administration of 100 mL Isovue-370 contrast. Additionally, sagittal and coronal reformatted images were obtained.  CT dose lowering techniques were used, to include: automated exposure control, adjustment for patient size, and or use of iterative reconstruction. Findings: Lung bases: There is compressive atelectasis in the lung bases. The heart is not enlarged. Liver: The liver is mildly hypodense. Spleen: Normal. Pancreas: Normal. Venograms: Normal. Gallbladder: The gallbladder is surgically absent. Kidneys: There are subtle areas of cortical hypoenhancement involving both kidneys. There are numerous nonobstructing bilateral renal calculi. There is no hydronephrosis or obstructive uropathy. Abdominal mesentery: There is no pathologic abdominal mass or adenopathy. Bowel loops: There is a moderate to large amount of retained colonic fecal debris. The appendix is surgically absent. The stomach is distended with air, fluid and debris. There is no small bowel obstruction. CT PELVIS: There is a Osorio  catheter within the urinary bladder. Abdominal aorta: There is no aneurysm or dissection. 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 There is no evidence of osseous collapse. No acute fractures are identified.     1. The stomach is distended with air, fluid and debris. Etiology is uncertain. 2. Subtle areas of cortical hypoenhancement involving both kidneys. This could be related to hypotension, acute tubular necrosis or pyelonephritis. 3. 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 4. Numerous nonobstructing bilateral renal calculi. 5. Constipation. Slot 63 Electronically signed by:  Leodan Topete M.D.  7/31/2021 8:39 PM    XR Chest 1 View    Result Date: 7/31/2021  FRONTAL VIEW OF THE CHEST CLINICAL INDICATION: Altered mental status. COMPARISON: 7/31/2021. FINDINGS: No focal consolidation, pleural effusion or pneumothorax. Cardiomediastinal morphology is normal. Osseous structures are unremarkable.     No acute cardiopulmonary abnormality. Electronically signed by:  Ryland Massey M.D.  7/31/2021 8:40 PM    XR Chest 1 View    Result Date: 7/31/2021  Exam:  Single view chest Date: July 31, 2021 History: Respiratory failure, unresponsive Comparison: Same day Technique: Single frontal radiograph of the chest was obtained Findings: There is a well-positioned endotracheal tube. The heart is not enlarged. Mediastinum and pulmonary vasculature are unremarkable. There is no pneumothorax or sizable pleural fluid collection.     Impression: 1. Well-positioned endotracheal tube. Slot 63 Electronically signed by:  Leodan Topete M.D.  7/31/2021 8:35 PM    CT Chest Pulmonary Embolism    Result Date: 7/31/2021  Exam: CTA thorax, PE protocol Date: July 31, 2021 History: Unresponsive, cardiac arrest, respiratory failure Comparison: None available Technique: Contiguous axial CT images obtained of the thorax following the  uneventful intravenous administration of 100 mL Isovue-370 contrast. Additionally, sagittal, coronal and 3-D reformatted images were obtained. CT dose lowering techniques were used, to include: automated exposure control, adjustment for patient size, and or use of iterative reconstruction. Findings: Thoracic aorta: There is limited characterization of the ascending aorta secondary to motion artifact. There is no aneurysm. HEART: Normal. Pulmonary arteries: The pulmonary arteries are reasonably well visualized. There is no filling defect to suggest an acute pulmonary embolus. Mediastinum: There is a well-positioned endotracheal tube. There is no pathologic mediastinal adenopathy. Lung parenchyma: There is compressive bibasilar atelectasis. There is no pneumothorax or pleural fluid collection. Upper abdomen: For evaluation of the abdomen, please see the full dictated report of the CT study of the abdomen and pelvis. Osseous structures: No acute fractures are identified.     1. No pulmonary embolus. 2. No acute abnormality. Slot 63 Electronically signed by:  Leodan Topete M.D.  7/31/2021 8:50 PM      ECHOCARDIOGRAM:       I reviewed the patient's new clinical results.    This note has been scribed by me for pulmonary attending physician.    Electronically signed by DOLORES Wiggins, 08/01/21 at 07:56 EDT.     Electronically signed by Priscilla Newman MD at 08/01/21 3250

## 2021-08-03 PROBLEM — F41.8 MIXED ANXIETY DEPRESSIVE DISORDER: Status: RESOLVED | Noted: 2021-08-01 | Resolved: 2021-08-03

## 2021-08-03 PROBLEM — T78.1XXA ALLERGIC REACTION TO ALPHA-GAL: Status: ACTIVE | Noted: 2021-08-03

## 2021-08-03 PROBLEM — J96.00 ACUTE RESPIRATORY FAILURE REQUIRING REINTUBATION: Status: RESOLVED | Noted: 2021-08-01 | Resolved: 2021-08-03

## 2021-08-03 PROBLEM — F31.30 BIPOLAR AFFECTIVE DISORDER, DEPRESSED: Chronic | Status: ACTIVE | Noted: 2021-08-03

## 2021-08-03 LAB
ALBUMIN SERPL-MCNC: 3.8 G/DL (ref 3.5–5.2)
ALBUMIN/GLOB SERPL: 2.2 G/DL
ALP SERPL-CCNC: 44 U/L (ref 39–117)
ALT SERPL W P-5'-P-CCNC: 18 U/L (ref 1–41)
ANION GAP SERPL CALCULATED.3IONS-SCNC: 6 MMOL/L (ref 5–15)
AST SERPL-CCNC: 16 U/L (ref 1–40)
BASOPHILS # BLD AUTO: 0 10*3/MM3 (ref 0–0.2)
BASOPHILS NFR BLD AUTO: 0.1 % (ref 0–1.5)
BILIRUB SERPL-MCNC: 0.6 MG/DL (ref 0–1.2)
BUN SERPL-MCNC: 19 MG/DL (ref 6–20)
BUN/CREAT SERPL: 19.8 (ref 7–25)
CALCIUM SPEC-SCNC: 8.8 MG/DL (ref 8.6–10.5)
CHLORIDE SERPL-SCNC: 106 MMOL/L (ref 98–107)
CO2 SERPL-SCNC: 28 MMOL/L (ref 22–29)
CREAT SERPL-MCNC: 0.96 MG/DL (ref 0.76–1.27)
D-LACTATE SERPL-SCNC: 1.1 MMOL/L (ref 0.5–2)
D-LACTATE SERPL-SCNC: 1.2 MMOL/L (ref 0.5–2)
D-LACTATE SERPL-SCNC: 3.1 MMOL/L (ref 0.5–2)
DEPRECATED RDW RBC AUTO: 42.9 FL (ref 37–54)
EOSINOPHIL # BLD AUTO: 0 10*3/MM3 (ref 0–0.4)
EOSINOPHIL NFR BLD AUTO: 0 % (ref 0.3–6.2)
ERYTHROCYTE [DISTWIDTH] IN BLOOD BY AUTOMATED COUNT: 12.9 % (ref 12.3–15.4)
GFR SERPL CREATININE-BSD FRML MDRD: 88 ML/MIN/1.73
GLOBULIN UR ELPH-MCNC: 1.7 GM/DL
GLUCOSE BLDC GLUCOMTR-MCNC: 132 MG/DL (ref 70–105)
GLUCOSE BLDC GLUCOMTR-MCNC: 134 MG/DL (ref 70–105)
GLUCOSE SERPL-MCNC: 117 MG/DL (ref 65–99)
HCT VFR BLD AUTO: 39.2 % (ref 37.5–51)
HGB BLD-MCNC: 13.1 G/DL (ref 13–17.7)
LYMPHOCYTES # BLD AUTO: 0.9 10*3/MM3 (ref 0.7–3.1)
LYMPHOCYTES NFR BLD AUTO: 7.7 % (ref 19.6–45.3)
MAGNESIUM SERPL-MCNC: 2.2 MG/DL (ref 1.6–2.6)
MCH RBC QN AUTO: 32.2 PG (ref 26.6–33)
MCHC RBC AUTO-ENTMCNC: 33.4 G/DL (ref 31.5–35.7)
MCV RBC AUTO: 96.6 FL (ref 79–97)
MONOCYTES # BLD AUTO: 0.9 10*3/MM3 (ref 0.1–0.9)
MONOCYTES NFR BLD AUTO: 8.2 % (ref 5–12)
NEUTROPHILS NFR BLD AUTO: 84 % (ref 42.7–76)
NEUTROPHILS NFR BLD AUTO: 9.6 10*3/MM3 (ref 1.7–7)
NRBC BLD AUTO-RTO: 0 /100 WBC (ref 0–0.2)
PHOSPHATE SERPL-MCNC: 3.4 MG/DL (ref 2.5–4.5)
PLATELET # BLD AUTO: 217 10*3/MM3 (ref 140–450)
PMV BLD AUTO: 8.1 FL (ref 6–12)
POTASSIUM SERPL-SCNC: 4.1 MMOL/L (ref 3.5–5.2)
PROT SERPL-MCNC: 5.5 G/DL (ref 6–8.5)
RBC # BLD AUTO: 4.06 10*6/MM3 (ref 4.14–5.8)
SODIUM SERPL-SCNC: 140 MMOL/L (ref 136–145)
WBC # BLD AUTO: 11.4 10*3/MM3 (ref 3.4–10.8)

## 2021-08-03 PROCEDURE — 25010000002 DIPHENHYDRAMINE PER 50 MG: Performed by: NURSE PRACTITIONER

## 2021-08-03 PROCEDURE — 85025 COMPLETE CBC W/AUTO DIFF WBC: CPT | Performed by: NURSE PRACTITIONER

## 2021-08-03 PROCEDURE — 25010000002 FONDAPARINUX PER 0.5 MG: Performed by: INTERNAL MEDICINE

## 2021-08-03 PROCEDURE — 25010000002 METHYLPREDNISOLONE PER 125 MG: Performed by: NURSE PRACTITIONER

## 2021-08-03 PROCEDURE — 83735 ASSAY OF MAGNESIUM: CPT | Performed by: NURSE PRACTITIONER

## 2021-08-03 PROCEDURE — 83605 ASSAY OF LACTIC ACID: CPT | Performed by: NURSE PRACTITIONER

## 2021-08-03 PROCEDURE — 82962 GLUCOSE BLOOD TEST: CPT

## 2021-08-03 PROCEDURE — 84100 ASSAY OF PHOSPHORUS: CPT | Performed by: NURSE PRACTITIONER

## 2021-08-03 PROCEDURE — 80053 COMPREHEN METABOLIC PANEL: CPT | Performed by: NURSE PRACTITIONER

## 2021-08-03 PROCEDURE — 99233 SBSQ HOSP IP/OBS HIGH 50: CPT | Performed by: FAMILY MEDICINE

## 2021-08-03 PROCEDURE — 36415 COLL VENOUS BLD VENIPUNCTURE: CPT | Performed by: NURSE PRACTITIONER

## 2021-08-03 PROCEDURE — 63710000001 PREDNISONE PER 1 MG: Performed by: INTERNAL MEDICINE

## 2021-08-03 PROCEDURE — 99251 PR INITL INPATIENT CONSULT NEW/ESTAB PT 20 MIN: CPT

## 2021-08-03 RX ORDER — LORAZEPAM 2 MG/ML
1 INJECTION INTRAMUSCULAR
Status: DISCONTINUED | OUTPATIENT
Start: 2021-08-03 | End: 2021-08-04 | Stop reason: HOSPADM

## 2021-08-03 RX ORDER — LORAZEPAM 1 MG/1
2 TABLET ORAL
Status: DISCONTINUED | OUTPATIENT
Start: 2021-08-03 | End: 2021-08-04 | Stop reason: HOSPADM

## 2021-08-03 RX ORDER — BUSPIRONE HYDROCHLORIDE 5 MG/1
5 TABLET ORAL EVERY 8 HOURS PRN
Status: DISCONTINUED | OUTPATIENT
Start: 2021-08-03 | End: 2021-08-03

## 2021-08-03 RX ORDER — LAMOTRIGINE 100 MG/1
200 TABLET ORAL NIGHTLY
Status: DISCONTINUED | OUTPATIENT
Start: 2021-08-03 | End: 2021-08-03

## 2021-08-03 RX ORDER — DIPHENHYDRAMINE HYDROCHLORIDE 50 MG/ML
12.5 INJECTION INTRAMUSCULAR; INTRAVENOUS EVERY 12 HOURS
Status: COMPLETED | OUTPATIENT
Start: 2021-08-03 | End: 2021-08-04

## 2021-08-03 RX ORDER — LORAZEPAM 2 MG/ML
2 INJECTION INTRAMUSCULAR
Status: DISCONTINUED | OUTPATIENT
Start: 2021-08-03 | End: 2021-08-04 | Stop reason: HOSPADM

## 2021-08-03 RX ORDER — PREDNISONE 20 MG/1
40 TABLET ORAL
Status: DISCONTINUED | OUTPATIENT
Start: 2021-08-03 | End: 2021-08-04 | Stop reason: HOSPADM

## 2021-08-03 RX ORDER — LORAZEPAM 1 MG/1
1 TABLET ORAL
Status: DISCONTINUED | OUTPATIENT
Start: 2021-08-03 | End: 2021-08-04 | Stop reason: HOSPADM

## 2021-08-03 RX ORDER — INSULIN LISPRO 100 [IU]/ML
0-7 INJECTION, SOLUTION INTRAVENOUS; SUBCUTANEOUS AS NEEDED
Status: DISCONTINUED | OUTPATIENT
Start: 2021-08-03 | End: 2021-08-04 | Stop reason: HOSPADM

## 2021-08-03 RX ORDER — LORAZEPAM 1 MG/1
1 TABLET ORAL ONCE
Status: COMPLETED | OUTPATIENT
Start: 2021-08-03 | End: 2021-08-03

## 2021-08-03 RX ORDER — LAMOTRIGINE 100 MG/1
200 TABLET ORAL DAILY
Status: DISCONTINUED | OUTPATIENT
Start: 2021-08-04 | End: 2021-08-04 | Stop reason: HOSPADM

## 2021-08-03 RX ORDER — INSULIN LISPRO 100 [IU]/ML
0-7 INJECTION, SOLUTION INTRAVENOUS; SUBCUTANEOUS
Status: DISCONTINUED | OUTPATIENT
Start: 2021-08-03 | End: 2021-08-04 | Stop reason: HOSPADM

## 2021-08-03 RX ADMIN — QUETIAPINE FUMARATE 100 MG: 100 TABLET ORAL at 09:47

## 2021-08-03 RX ADMIN — VENLAFAXINE HYDROCHLORIDE 225 MG: 75 CAPSULE, EXTENDED RELEASE ORAL at 09:47

## 2021-08-03 RX ADMIN — Medication 10 ML: at 21:35

## 2021-08-03 RX ADMIN — DOXYCYCLINE 100 MG: 100 TABLET, FILM COATED ORAL at 09:48

## 2021-08-03 RX ADMIN — DIPHENHYDRAMINE HYDROCHLORIDE 25 MG: 50 INJECTION, SOLUTION INTRAMUSCULAR; INTRAVENOUS at 01:30

## 2021-08-03 RX ADMIN — CETIRIZINE HYDROCHLORIDE 10 MG: 10 TABLET, FILM COATED ORAL at 09:48

## 2021-08-03 RX ADMIN — PREDNISONE 40 MG: 20 TABLET ORAL at 09:47

## 2021-08-03 RX ADMIN — LORAZEPAM 1 MG: 1 TABLET ORAL at 22:55

## 2021-08-03 RX ADMIN — ACETAMINOPHEN 650 MG: 325 TABLET, FILM COATED ORAL at 15:38

## 2021-08-03 RX ADMIN — DIPHENHYDRAMINE HYDROCHLORIDE 12.5 MG: 50 INJECTION, SOLUTION INTRAMUSCULAR; INTRAVENOUS at 21:34

## 2021-08-03 RX ADMIN — METHYLPREDNISOLONE SODIUM SUCCINATE 60 MG: 125 INJECTION, POWDER, FOR SOLUTION INTRAMUSCULAR; INTRAVENOUS at 05:00

## 2021-08-03 RX ADMIN — FAMOTIDINE 40 MG: 20 TABLET ORAL at 09:47

## 2021-08-03 RX ADMIN — Medication 10 ML: at 10:06

## 2021-08-03 RX ADMIN — DIPHENHYDRAMINE HYDROCHLORIDE 25 MG: 50 INJECTION, SOLUTION INTRAMUSCULAR; INTRAVENOUS at 09:47

## 2021-08-03 RX ADMIN — DEXMEDETOMIDINE HYDROCHLORIDE 0.5 MCG/KG/HR: 100 INJECTION, SOLUTION INTRAVENOUS at 00:15

## 2021-08-03 RX ADMIN — DOXYCYCLINE 100 MG: 100 TABLET, FILM COATED ORAL at 21:34

## 2021-08-03 RX ADMIN — FONDAPARINUX SODIUM 2.5 MG: 2.5 INJECTION SUBCUTANEOUS at 15:38

## 2021-08-03 RX ADMIN — LORAZEPAM 2 MG: 1 TABLET ORAL at 10:06

## 2021-08-03 RX ADMIN — THERA TABS 1 TABLET: TAB at 09:48

## 2021-08-03 RX ADMIN — BUSPIRONE HYDROCHLORIDE 5 MG: 5 TABLET ORAL at 17:14

## 2021-08-03 RX ADMIN — QUETIAPINE FUMARATE 400 MG: 100 TABLET ORAL at 21:34

## 2021-08-03 NOTE — PLAN OF CARE
Goal Outcome Evaluation:         Patient became very agitated and fearful and anxious in the beginning of the shift. Nurse gave patient ordered dose of ativan and the patient responded well to treatment. Patient was then put on bedside commode, but did not have bowel movement. Patient was cleaned up for the night and given night time meds and settled very well after. Patient remains on precedex drip. Family is at bedside and assists patient with daily activities and trying to keep patient calm. Vitals remained stable throughout shift.

## 2021-08-03 NOTE — CONSULTS
[facility] Medicine Services - Consult Note    Patient Name: Warren Giraldo  : 1980  MRN: [id number]  Primary Care Physician:  Baldev Sandy MD  Referring Physician: Baldev Sandy, *  Date of admission: 2021    Consults    Subjective      Reason for Consult/ Chief Complaint: collapse at home, assumed anaphylactic reaction    HPI taken from pulmonary note and edited:   History of Present Illness: Warren Giraldo is a 40 y.o. male who presented to [facility] ED on 2021 via EMS after collapse at home.  Patient has severe alpha gal allergy so he is allergic to any mammal product. The allergy started in 2020 from a tick-bite. The patient had chicken salad at home on the day of admission 2021. The wife believes there was some cross-contamination of mammal products in the chicken salad. She witnessed the patient collapse at home. Per wife patient was not breathing and she was unsure if she felt a pulse.  She gave patient epi and attempted to deliver breaths; however, did not feel like it was working.  She began chest compressions while on the phone with EMS. When EMS arrived the patient was found to have a pulse and was moving around although not following commands and was vomiting.  Patient was given additional Benadryl IV and Zofran in route.  Wife states that EMS attempted to intubate patient however they were unsuccessful.   Patient was given sepsis bolus in ED and was intubated in ED for airway protection. The wife stated the patient had an anaphylactic reaction approximately two weeks ago and recovered with epi injection. He is being managed by [facility] for his alpha gal allergy; however, they are seeking a specialist more familiar to this allergy. Patient had another recent tick bit and had negative tick panel. He has been on doxycycline empirically as an outpatient.      In the ED, labs were obtained with abnormalities as  follows: Glucose 110, ionized calcium 0.83, PTT 20.6.  ABG: pH 7.525, PCO2 22.6, PO2 114.6, HCO3 18.7.  UA negative for UTI.  CXR was negative for acute cardiopulmonary normalities.  CT head without contrast was negative for acute intracranial abnormality.  CT abdomen pelvis showed stomach distention with air.  Double areas of cortical hypoenhancement involving both kidneys, which could be related to hypotension, acute tubular necrosis, or polynephritis.  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     Date:   8/1: Extubated and had to be reintubated.     8/2: Self extubated.  No shortness of air. O2 being weaned. On precedex drip.     8/3: Weaned to room air. No complaints. Dietician consulted due to patient's severe mammal allergy. Psych consulted due to patient's anxiety. Pt felt stable for transfer out of ICU and hospitalist group consulted for medical management.     Review of Systems   Constitutional: Negative.   HENT: Positive for sore throat.    Eyes: Negative.    Cardiovascular: Negative.    Respiratory: Negative.    Endocrine: Negative.    Hematologic/Lymphatic: Negative.    Skin: Negative.    Musculoskeletal: Negative.    Gastrointestinal: Negative.    Genitourinary: Negative.    Neurological: Negative.    Psychiatric/Behavioral: Negative.    Allergic/Immunologic: Negative.    All other systems reviewed and are negative.      Personal History     Past Medical History:   Diagnosis Date   • Acne varioliformis    • Acute reaction to stress    • Allergic rhinitis    • Allergy to alpha-gal    • Anxiety    • Anxiety    • Costochondritis 03/04/2013   • Depression    • Gastroesophageal reflux disease with esophagitis    • History of kidney stones    • Migraine with aura    • PUD (peptic ulcer disease)        Past Surgical History:   Procedure Laterality Date   • APPENDECTOMY     • CHOLECYSTECTOMY     • HYDROCELE  EXCISION / REPAIR Left        Family History: family history includes Coronary artery disease in his father; Diabetes in his father. Otherwise pertinent FHx was reviewed and not pertinent to current issue.    Social History:  reports that he has never smoked. He has never used smokeless tobacco. He reports current alcohol use of about 14.0 standard drinks of alcohol per week. Drug use questions deferred to the physician.    Home Medications:   EPINEPHrine, LORazepam, QUEtiapine, cetirizine, doxycycline, famotidine, lamoTRIgine, multivitamin, and venlafaxine    Allergies:  Allergies   Allergen Reactions   • Bee Venom Anaphylaxis   • Beef-Derived Products Anaphylaxis     Alpha-gal   • Latex Anaphylaxis   • Other Anaphylaxis     Has Alpha gal so any mammal products   • Pork-Derived Products Anaphylaxis     Alpha-gal           Objective      Vitals:  Temp:  [97.7 °F (36.5 °C)-102.1 °F (38.9 °C)] 97.7 °F (36.5 °C)  Heart Rate:  [] 59  Resp:  [16-37] 16  BP: ()/(66-98) 95/66  Flow (L/min):  [2] 2    Physical Exam  Vitals and nursing note reviewed.   HENT:      Head: Normocephalic and atraumatic.   Eyes:      Extraocular Movements: Extraocular movements intact.      Pupils: Pupils are equal, round, and reactive to light.   Cardiovascular:      Rate and Rhythm: Normal rate and regular rhythm.      Pulses: Normal pulses.      Heart sounds: Normal heart sounds.   Pulmonary:      Effort: Pulmonary effort is normal.      Breath sounds: Normal breath sounds.   Abdominal:      General: Bowel sounds are normal.      Palpations: Abdomen is soft.      Tenderness: There is no abdominal tenderness.   Musculoskeletal:         General: Normal range of motion.      Cervical back: Normal range of motion.   Skin:     General: Skin is warm and dry.   Neurological:      Mental Status: He is alert and oriented to person, place, and time.   Psychiatric:         Mood and Affect: Mood normal.         Behavior: Behavior normal.          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       Plan:     Acute respiratory failure requiring intubation, ?2/2 anaphylaxis from food allergy  Witnessed respiratory arrest at home  Possible aspiration pneumonia  ?Cardiac arrest  -Events at home noted, unclear if patient had cardiac arrest  -Patient intubated for airway protection in ED.   -Planned extubation 8/1 however failed after 6 hours and required reintubation  -Self extubated 8/2  -Now on room air  -Continue Benadryl and Pepcid  -Steroids weaned to prednisone  -can only take xopenex and family will get home breathing treatments if needed  -on arixtra    Severe alpha-gel anaphylactic reaction  -avoid all mammal products  -continue prednisone   -dietician consulted to assist with food while in the hospital     Rule out sepsis without shock, secondary to possible aspiration pneumonia, Ruled Out  -Witnessed emesis prior to arrival and in ED  -IVF bolus 30 mL/kg per sepsis protocol given in ED, now off IVFs  -Lactic Acid 2.2 >2.3 >1.9 > 2.7 > 3.1 > 1.2  -Blood cultures-pending  -UA-negative  -Procalcitonin-not elevated, CRP-not elevated  -Received cefepime empirically.  Will discontinue and monitor off  antibiotics     Gastroesophageal reflux disease with esophagitis  Peptic ulcer disease  -continue home pepcid     Numerous nonobstructing bilateral renal calculi  -Osorio catheter placed in ED  -Continue fluids  -Renal ultrasound reviewed, nonobstructing stone in the right kidney  -renal function normal      Anxiety  Depressive disorder  -Continue home Seroquel  -Continue home venlafaxine  -Psych consulted. Continue home lamictal if ok with psych team    This patient has been examined wearing appropriate Personal Protective Equipment 08/03/21      Signature: Electronically signed by DOLORES Graves, 08/03/21, 11:22 AM EDT.    Attending attestation:    I performed a history and physical examination of the patient. I reviewed the nurse practitioner's note and agree with the documented findings and plan of care.    S:    Patient is a 40-year-old male with history of GERD, alpha gal allergy presenting for evaluation of anaphylactic reaction.  Patient went into respiratory failure requiring intubation now extubated being transferred out of the ICU hospitalist consulted for medical management.    O:    Vital signs reviewed    General: Male lying in bed breathing comfortably on room air no acute distress  HEENT: NC/AT, EOMI, mucosa moist  Heart: Regular, rate controlled  Chest: Normal work of breathing, moving air well no wheezing  Abdominal: Soft. NT/ND.   Musculoskeletal: Normal ROM.  No edema. No calf tenderness.  Neurological: AAOx3, no focal deficits  Skin: Skin is warm and dry. No rash  Psychiatric: Normal mood and affect.    A/P    Anaphylactic reaction-thought to be due to alpha gal reaction at this time, patient has required antihistamines epinephrine and steroids  -Airway clear at this time  -Family requesting nutrition consult to discuss dietary changes, discussed that patient may benefit from seeing an allergist on an outpatient basis  -Continue steroids    Acute respiratory failure-with hypoxia  secondary to possible anaphylactic reaction, patient now extubated on room air  -Family reports patient has severe reaction to albuterol requesting only Xopenex  -Continue steroids  -Patient initially failed extubation required reintubation then self extubated  -Continues on room air  -Monitor for any signs of aspiration    Fever-transient of 102 overnight, family reports that patient has had fevers previously with albuterol and is currently refusing it cannot rule out any underlying pulmonary infection from aspiration  -CT chest on admission showing no infiltrates  -Repeat chest x-rays with intubations showing no infiltrates  -If fever returns repeat chest x-ray  -Family is reporting drug reaction  -Blood cultures no growth x2 days  -Covid negative  -If fever returns check for DVTs    Leukocytosis-mild may be secondary to steroids  -Monitor closely  -If fever returns repeat chest x-ray in cultures    Lactic acidosis-has since resolved uncertain if due to sympathetic overdrive versus intravascular depletion  -No further monitoring     Nephrolithiasis-noted incidentally on CT imaging, renal ultrasound showing nonobstructive process  -Patient asymptomatic    GERD/anxiety/depression-chronic in nature  -Resume home medication as clinically appropriate    Electronically signed by Sarbjit Goyal MD, 08/03/21, 2:06 PM EDT.

## 2021-08-03 NOTE — PROGRESS NOTES
PULMONARY/CRITICAL CARE PROGRESS NOTE       NAME:     Warren Giraldo   AGE:     40 y.o.   SEX:  male   : 1980       MRN:       [id number]          RM: [facility] ICU      ASSESSMENT & PLAN     F/U: Acute respiratory failure    Active Problems:    Anxiety    Depressive disorder    Gastroesophageal reflux disease with esophagitis    PUD (peptic ulcer disease)    Acute respiratory failure requiring reintubation (CMS/[facility])    Mixed anxiety depressive disorder    Anaphylaxis due to food    Respiratory arrest (CMS/[facility])    Aspiration pneumonia (CMS/[facility])    Sepsis without septic shock (CMS/[facility])     Multidisciplinary Rounds:  -MOF, hospitalist consulted  -[facility] protocol  -Psych consult  -Room Air    Assessment and plan  Acute respiratory failure requiring intubation, ?2/2 anaphylaxis from food allergy  Witnessed respiratory arrest at home  Possible aspiration pneumonia  ?Cardiac arrest  -Events at home noted, unclear if patient had cardiac arrest  -Patient intubated for airway protection  -Planned extubation  however failed after 6 hours and required reintubation  -Self extubated   -Wean support to keep O2 saturation > 91%  -Continue Benadryl and Pepcid  -Continue systemic steroids  -Takes Xoponex, can take PRN, discontinued RT meds  -Room Air     Rule out sepsis without shock, secondary to possible aspiration pneumonia, Ruled Out  -Witnessed emesis prior to arrival and in ED  -IVF bolus 30 mL/kg per sepsis protocol given in ED  -Continue IVFs  -Lactic Acid 2.2, monitor and trend labs until normalized.  -Blood cultures-pending  -UA-negative  -Procalcitonin-not elevated, CRP-not elevated  -Received cefepime empirically.  Will discontinue and monitor off antibiotics     Gastroesophageal reflux disease with esophagitis  Peptic ulcer disease  -Continue home sucralfate when clinically appropriate  -Continue home Pepcid when clinically appropriate     Numerous nonobstructing bilateral renal calculi  -Osorio  catheter placed in ED  -Continue fluids  -Renal ultrasound reviewed, nonobstructing stone in the right kidney  -Consider nephrology consult if needed     Anxiety  Depressive disorder  -Continue home Seroquel when clinically appropriate  -Continue home Lamictal when clinically appropriate  -Continue home venlafaxine when clinically appropriate  -Psych consulted    Code Status: FULL  VTE Prophylaxis: SCDs, Arixtra  PUD Prophylaxis: Pepcid      [place] & SUBJECTIVE     Patient presented to [facility] on 7/31/2021 via EMS after collapse at home.  Patient has severe allergies, and wife was at home when patient collapsed.  Per wife patient was not breathing and she was unsure if she felt a pulse.  She gave patient epi and attempted to deliver breaths, however did not feel like it was working.  She began chest compressions while on the phone with EMS.  Patient has beef and pork allergy, and had chicken salad today with which wife believes there may have been some cross-contamination.  Wife states that this is happened before, with most recent allergic reaction happening roughly 2 weeks ago however not this severe.  When EMS arrived patient was found to have a pulse and was moving around although not following commands with vomiting.  Patient was given additional Benadryl IV and Zofran in route.  Wife states that EMS attempted to intubate patient however they were unsuccessful.   Patient was given sepsis bolus in ED and was intubated in ED for airway protection.     In the ED, labs were obtained with abnormalities as follows: Glucose 110, ionized calcium 0.83, PTT 20.6.  ABG: pH 7.525, PCO2 22.6, PO2 114.6, HCO3 18.7.  UA negative for UTI.  CXR was negative for acute cardiopulmonary normalities.  CT head without contrast was negative for acute intracranial abnormality.  CT abdomen pelvis showed stomach distention with air.  Double areas of cortical hypoenhancement involving both kidneys, which could be related to  "hypotension, acute tubular necrosis, or polynephritis.  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    8/1: intubated, awake and agitated  8/2: Self extubated this morning.  No shortness of air.  Patient remains anxious with all sedation off.  8/3: Room air, denies chest pain, shortness of breath, abdominal pain.    REVIEW OF SYSTEMS:  Pertinent items are noted in HPI, all other systems reviewed and negative      MEDICATIONS     SCHEDULED MEDICATIONS:   budesonide, 0.5 mg, Nebulization, BID - RT  cetirizine, 10 mg, Oral, Daily  diphenhydrAMINE, 25 mg, Intravenous, Q6H  doxycycline, 100 mg, Oral, Q12H  famotidine, 40 mg, Oral, Daily  fondaparinux, 2.5 mg, Subcutaneous, Q24H  insulin lispro, 0-7 Units, Subcutaneous, Q6H  ipratropium-albuterol, 3 mL, Nebulization, Q6H - RT  methylPREDNISolone sodium succinate, 60 mg, Intravenous, Q6H  multivitamin, 1 tablet, Oral, Daily  QUEtiapine, 100 mg, Oral, Daily  QUEtiapine, 400 mg, Oral, Nightly  risperiDONE, 0.25 mg, Oral, Q12H  sodium chloride, 10 mL, Intravenous, Q12H  venlafaxine XR, 225 mg, Oral, Daily With Breakfast        CONTINUOUS INFUSIONS:   dexmedetomidine, 0.2-1.5 mcg/kg/hr, Last Rate: 0.5 mcg/kg/hr (08/03/21 0015)  sodium chloride, 50 mL/hr, Last Rate: 50 mL/hr (08/01/21 1701)        PRN MEDS:  •  acetaminophen **OR** acetaminophen  •  albuterol sulfate HFA  •  dextrose  •  dextrose  •  glucagon (human recombinant)  •  insulin lispro **AND** insulin lispro  •  LORazepam  •  nitroglycerin  •  ondansetron **OR** ondansetron  •  [COMPLETED] Insert peripheral IV **AND** sodium chloride  •  sodium chloride  •  vecuronium    OBJECTIVE     VITAL SIGNS:  /77   Pulse 64   Temp 97.7 °F (36.5 °C) (Oral)   Resp 16   Ht 182.9 cm (72\")   Wt 73.3 kg (161 lb 9.6 oz)   SpO2 96%   BMI 21.92 kg/m²     I/O FROM PREVIOUS 3 SHIFTS:  I/O last 3 completed shifts:  In: 2761 " [I.V.:1881; Other:262; NG/GT:267]  Out: 2900 [Urine:2900]      I/O PREVIOUS 24 HOURS:   Intake/Output                             08/01/21 0701 - 08/02/21 0700 08/02/21 0701 - 08/03/21 0700 08/03/21 0701 - 08/04/21 0700     5413-0225 1688-4766 Total 4129-5007 6731-3724 Total 1317-3083 9550-2804 Total                    Intake    I.V.  301  1379 1680  502  -- 502  --  -- --    Other  --  222 222  40  -- 40  --  -- --    Flush/ Irrigation Intake (mL) ([REMOVED] NG/OG Tube Nasogastric Right nostril) -- 222 222 40 -- 40 -- -- --    NG/GT  --  267 267  --  -- --  --  -- --    Total Intake 301 1868 2169 542 -- 542 -- -- --       Output    Urine  1100  850 1950  1100  950 2050  1100  -- 1100    Total Output 5054 181 5778 0812 830 7605 1100 -- 1100           NET BALANCE SINCE ADMISSION:  Net IO Since Admission: -1,682 mL [08/03/21 0855]     PHYSICAL EXAM:  General Appearance:  Alert, agitated at times  Head:  Normocephalic, without obvious abnormality, atraumatic.   Eyes:  PERRL, conjunctiva/corneas clear, EOM not assessed     Neck:  Supple, no JVD, trachea midline  Lungs:   Coarse but clear throughout, no rhonchi or wheezing, respirations unlabored without accessory muscle use  Chest wall:  Symmetrical chest wall movement  Heart:  Sinus tachycardia, S1 and S2 normal, no murmur, rub or gallop  Abdomen:  Soft, non-distended, bowel sounds active all four quadrants.  Nontender.  No rebound or guarding  Extremities:  No clubbing, no cyanosis or edema  Skin:  No rashes or lesions  Neurologic:   Alert and oriented x3.  No lateralizing deficits      RESULTS     LABS:  Lab Results (last 24 hours)     Procedure Component Value Units Date/Time    Lactic Acid, Plasma [[id number]]  (Normal) Collected: 08/03/21 0736    Specimen: Blood Updated: 08/03/21 0829     Lactate 1.2 mmol/L     Comprehensive Metabolic Panel [[id number]]  (Abnormal) Collected: 08/03/21 0736    Specimen: Blood Updated: 08/03/21 0826     Glucose 117 mg/dL      BUN 19  mg/dL      Creatinine 0.96 mg/dL      Sodium 140 mmol/L      Potassium 4.1 mmol/L      Chloride 106 mmol/L      CO2 28.0 mmol/L      Calcium 8.8 mg/dL      Total Protein 5.5 g/dL      Albumin 3.80 g/dL      ALT (SGPT) 18 U/L      AST (SGOT) 16 U/L      Alkaline Phosphatase 44 U/L      Total Bilirubin 0.6 mg/dL      eGFR Non African Amer 88 mL/min/1.73      Globulin 1.7 gm/dL      A/G Ratio 2.2 g/dL      BUN/Creatinine Ratio 19.8     Anion Gap 6.0 mmol/L     Narrative:      GFR Normal >60  Chronic Kidney Disease <60  Kidney Failure <15      Phosphorus [[id number]]  (Normal) Collected: 08/03/21 0736    Specimen: Blood Updated: 08/03/21 0826     Phosphorus 3.4 mg/dL     Magnesium [[id number]]  (Normal) Collected: 08/03/21 0736    Specimen: Blood Updated: 08/03/21 0826     Magnesium 2.2 mg/dL     CBC & Differential [[id number]]  (Abnormal) Collected: 08/03/21 0736    Specimen: Blood Updated: 08/03/21 0759    Narrative:      The following orders were created for panel order CBC & Differential.  Procedure                               Abnormality         Status                     ---------                               -----------         ------                     CBC Auto Differential[[id number]]        Abnormal            Final result                 Please view results for these tests on the individual orders.    CBC Auto Differential [[id number]]  (Abnormal) Collected: 08/03/21 0736    Specimen: Blood Updated: 08/03/21 0759     WBC 11.40 10*3/mm3      RBC 4.06 10*6/mm3      Hemoglobin 13.1 g/dL      Hematocrit 39.2 %      MCV 96.6 fL      MCH 32.2 pg      MCHC 33.4 g/dL      RDW 12.9 %      RDW-SD 42.9 fl      MPV 8.1 fL      Platelets 217 10*3/mm3      Neutrophil % 84.0 %      Lymphocyte % 7.7 %      Monocyte % 8.2 %      Eosinophil % 0.0 %      Basophil % 0.1 %      Neutrophils, Absolute 9.60 10*3/mm3      Lymphocytes, Absolute 0.90 10*3/mm3      Monocytes, Absolute 0.90 10*3/mm3      Eosinophils, Absolute 0.00  10*3/mm3      Basophils, Absolute 0.00 10*3/mm3      nRBC 0.0 /100 WBC     Lactic Acid, Plasma [[id number]]  (Abnormal) Collected: 08/02/21 2341    Specimen: Blood Updated: 08/03/21 0024     Lactate 3.1 mmol/L     Blood Culture - Blood, Arm, Right [[id number]] Collected: 07/31/21 2232    Specimen: Blood from Arm, Right Updated: 08/02/21 2246     Blood Culture No growth at 2 days    Blood Culture - Blood, Arm, Left [[id number]] Collected: 07/31/21 2227    Specimen: Blood from Arm, Left Updated: 08/02/21 2246     Blood Culture No growth at 2 days    Lactic Acid, Plasma [[id number]]  (Abnormal) Collected: 08/02/21 1904    Specimen: Blood Updated: 08/02/21 1935     Lactate 2.7 mmol/L     POC Glucose Once [[id number]]  (Abnormal) Collected: 08/02/21 1733    Specimen: Blood Updated: 08/02/21 1735     Glucose 113 mg/dL      Comment: Serial Number: [id number]:  [id number]       Lactic Acid, Plasma [[id number]]  (Normal) Collected: 08/02/21 1302    Specimen: Blood Updated: 08/02/21 1339     Lactate 1.9 mmol/L            RADIOLOGY:  No Radiology Exams Resulted Within Past 24 Hours    ECHOCARDIOGRAM:  None previous for comparison.     I reviewed the patient's new clinical results.    This note has been scribed by me for pulmonary attending physician.      Electronically signed by DOLORES Cherry, 08/03/21 at 08:55 EDT.     I personally have examined  and interviewed the patient. I have reviewed the history, data, problems, assessment and plan with our NP.  Critical care time in direct medical management (   ) minutes  Electronically signed by Samson Roman MD, D,ABSM, 08/03/21, 9:27 PM EDT.

## 2021-08-03 NOTE — PLAN OF CARE
Goal Outcome Evaluation:                   Patient downgrade from ICU. Admitted for anaphylactic shock. Patient is doing well, no complaints.

## 2021-08-03 NOTE — PROGRESS NOTES
Nutrition Services    Patient Name: Warren Giraldo  YOB: 1980  MRN: [id number]  Admission date: 7/31/2021    Comment:     Will continue to monitor PO intakes for adequacy     Caution to starting any oral nutritional supplements given multiple food allergies.     PPE Documentation        PPE Worn By Provider Mask, Eye Protection    PPE Worn By Patient  N/A     CLINICAL NUTRITION ASSESSMENT       Reason for Assessment Follow up protocol     8/1: Tube feeding consult      H&P:  Pt admitted after collapsing at home likely due to food allergy and anaphylaxis. Pt with Hx alpha-gal.    Past Medical History:   Diagnosis Date   • Acne varioliformis    • Acute reaction to stress    • Allergic rhinitis    • Allergy to alpha-gal    • Anxiety    • Anxiety    • Costochondritis 03/04/2013   • Depression    • Gastroesophageal reflux disease with esophagitis    • History of kidney stones    • Migraine with aura    • PUD (peptic ulcer disease)        Past Surgical History:   Procedure Laterality Date   • APPENDECTOMY     • CHOLECYSTECTOMY     • HYDROCELE EXCISION / REPAIR Left       Current Problems:   Acute respiratory failure requiring intubation, ?2/2 anaphylaxis from food allergy  --Witnessed respiratory arrest at home  --Possible aspiration pneumonia  --Cardiac arrest? - events at home noted, unclear if patient had cardiac arrest  -Patient intubated 7/31 for airway protection  -extubated 8/2     Rule out sepsis without shock, secondary to possible aspiration pneumonia  -Witnessed emesis prior to arrival and in ED  -Cefepime in place      Gastroesophageal reflux disease with esophagitis  Peptic ulcer disease     Numerous nonobstructing bilateral renal calculi     Anxiety  Depressive disorder       Nutrition/Diet History         Narrative     8/3: Visited patient this AM who was sleeping. Patient's wife present and able to provide information. She reports patient was dx'd with Alpha-Gal about 11 months ago, and  "up until recently only required to avoid beef/pork products. Wife states patient had consumed chicken salad which she believed contained milk products. Wife thinks it was this food item that caused the anaphylaxis response. She now wishes for the patient to avoid foods which contain beef, pork, and milk protein. Patient can tolerate chicken and fish. Wife is also very concerned about cross contamination of foods and wishes to supplement patient's hospital diet with food brought in from home. Explained patient will still be able to pick and choose from food items from our menu that they deem safe to consume.     For baseline nutrition, wife thinks patient has been maintaining his weight around 160 lbs and appetite was at baseline prior to intubation. She describes his food intake as \"sporadiac\" but \"always has ate like that\".     8/1: Received consult for tube feedings. Pt with Hx alpha-gal and possible anaphylactic food allergy response, which resulted in this hospitalization. Pt just extubated this morning, but not yet ready to re-start PO. NG already is in place for nutrition.      Functional Status Currently critically ill; prior to admission was independent for ADLs     Food Allergies Beef, Pork and milk protein r/t Alpha-Gal      Anthropometrics        Current Height, Weight Height: 182.9 cm (72\")  Weight: 73.3 kg (161 lb 9.6 oz) (08/02/21 0550)        Admit Height, Weight -    Flowsheet Rows      First Filed Value   Admission Height  182.9 cm (72\") Documented at 08/01/2021 0315   Admission Weight  76.4 kg (168 lb 6.9 oz) Documented at 08/01/2021 0100             Ideal Body Weight (IBW) 178 lb   % Ideal Body Weight 90 %       Usual Body Weight UTD   % Usual Body Weight UTD   Wt Change Observation 8/3: Down 4.1% since last full review     8/1: Current weight is C/W extended weight Hx per documentation   Weight Hx    Wt Readings from Last 30 Encounters:   08/02/21 0550 73.3 kg (161 lb 9.6 oz)   08/01/21 0100 76.4 " kg (168 lb 6.9 oz)   07/23/21 1352 75.7 kg (166 lb 12.8 oz)   07/21/21 1623 77.1 kg (170 lb)   03/20/20 1851 78.4 kg (172 lb 13.5 oz)   12/17/19 0832 74.8 kg (165 lb)   08/22/19 1031 72.6 kg (160 lb 0.9 oz)   08/22/19 1000 74.8 kg (165 lb)   03/27/19 0000 72.1 kg (159 lb)   03/18/19 0000 72.1 kg (159 lb)   02/11/19 0000 73.6 kg (162 lb 3.2 oz)   07/12/18 0000 73.5 kg (162 lb)   12/23/16 0000 79.4 kg (175 lb)   09/24/16 1221 74.5 kg (164 lb 3.2 oz)   08/16/16 0000 70.8 kg (156 lb)   06/23/16 0000 71.7 kg (158 lb)        BMI kg/m2 Body mass index is 21.92 kg/m².     Labs/Medications         Pertinent Labs -   Results from last 7 days   Lab Units 08/03/21  0736 08/02/21  0610 08/01/21  0446   SODIUM mmol/L 140 141 139   POTASSIUM mmol/L 4.1 4.8 4.8   CHLORIDE mmol/L 106 106 105   CO2 mmol/L 28.0 26.0 23.0   BUN mg/dL 19 15 12   CREATININE mg/dL 0.96 1.04 0.99   CALCIUM mg/dL 8.8 8.8 8.3*   BILIRUBIN mg/dL 0.6 0.4 0.2   ALK PHOS U/L 44 51 51   ALT (SGPT) U/L 18 20 21   AST (SGOT) U/L 16 19 20   GLUCOSE mg/dL 117* 151* 129*     Results from last 7 days   Lab Units 08/03/21  0736 08/02/21  0610 08/02/21  0610 08/01/21  0446 08/01/21  0446   MAGNESIUM mg/dL 2.2  --  2.2  --  2.0   PHOSPHORUS mg/dL 3.4   < > 3.1   < > 3.5   HEMOGLOBIN g/dL 13.1   < > 14.1   < > 14.7   HEMATOCRIT % 39.2   < > 41.5   < > 43.7    < > = values in this interval not displayed.     COVID19   Date Value Ref Range Status   07/31/2021 Not Detected Not Detected - Ref. Range Final     No results found for: HGBA1C      Pertinent Medications Benadryl, Adoxa, Pepcid, admelog, MVI, prednisone, Seroquel, Effexor,      Physical Findings        Overall Physical   Appearance, MSA 8/3: Unable to fully visualize patient as he was sleeping under blankets     8/1: Did not complete NFPE this date    -  Edema  None documented      Gastrointestinal No BM this admission (If does not go today will make day #3)      Tubes None      Oral/Mouth Cavity Wife reports patient  "does not have issues with chewing or swallowing. Has a permanent retainer in place. Has his own teeth.      Skin No breakdown documented        Estimated/Assessed Needs    Estimated 8/1/21    Energy Requirements    Height for Calculation  Height: 182.9 cm (72\")   Weight for Calculation 76.4 kg    Method for Estimation  25-30 kcal/kg   EST Needs (kcal/day) 4364-7109 kcal/day       Protein Requirements    Weight for Calculation 76.4 kg   EST Protein Needs (g/kg) 1.2 g/kg   EST Daily Needs (g/day) 92 g/day       Fluid Requirements     Estimated Needs (mL/day) 1mL/kcal - monitor hydration status        Fluid Deficit    Current Na Level (mEq/L) -   Desired Na Level (mEq/L) -   Estimated Fluid Deficit (L)  -     Current Nutrition Orders & Evaluation of Received Nutrient/Fluid Intake       Oral Nutrition     Current PO Diet NPO Diet   Supplement None    PO Evaluation     % PO Intake 8/3: 0% r/t mostly NPO, diet just adavanced     8/1: NPO since admission    -  Enteral Nutrition    Enteral Route -    TF Modular -   TF Delivery Method -   Current Ordered TF  -   Current Ordered H2O flush  -    TF Observation  -   EN Evaluation     TF Changes -    TF Residual -    TF Tolerance -    Average EN Delivered -       Parenteral Nutrition     TPN Route -   Current Ordered TPN VOL  -   Dextrose (g/kcals)  -   Amino Acid (g/kcals) -   Lipids (mL/Concentration/FREQ)  -   MVI Frequency  -   Trace Element Frequency  -   TPN Observation  -    TPN Evaluation    Total # Days on TPN -   -  Clinical Course    Nutrition Course Details PO Diet:    7/31 to 8/2 NPO  8/3 Regular     Nutrition Support:  8/1-8/2 On tube feed  - Did not reach goal rate      Nutritional Risk Screening        NRS-2002 Score   -       Nutrition Diagnosis         Nutrition Dx Problem 1 Unable to determine at this time.       Nutrition Dx Problem 2 -       Intervention Goal         Intervention Goal(s) PO intakes to be > 75% of meals      Nutrition Intervention      "   RD/Tech Action Will continue to monitor PO intakes for adequacy     Caution to starting oral nutritional supplements given multiple food allergies      Nutrition Prescription          Diet Prescription Regular    Supplement Prescription -   -  Enteral Prescription -        TPN Prescription -     Monitor/Evaluation        Monitor PO intakes, Labs, BM, Weight, Skin and medication changes      Electronically signed by:  Xiomy Carranza RD  08/03/21 13:15 EDT

## 2021-08-03 NOTE — CONSULTS
Referring Provider: Dr Sarbjit Goyal MD  Reason for Consultation: Psychiatric Medication Management      Chief complaint Home medications not given correctly.    Subjective     History of present illness:    The patient is a 40 y.o. male who was admitted secondary to LOC determined to be from an anaphylactic reaction to a food allergy. Psychiatry was consulted for medication management, due to confusion about what the patient needed to receive while staying in the hospital to meet his psychiatric management needs. Upon evaluation the patient and his wife explained he has no current psychiatric symptoms and has been stable on medication for bipolar disorder and PTSD for 4 years now. The patient and his wife were concerned because the patient was almost given Risperdol instead of Lamictal for a reason they did not understand. The patient has a history of reacting poorly to Risperdol in the past, as they contributed a week long psychiatric hospital stay at [facility] in 2014 to that medication. The patient's wife also was concerned that the patient was having difficulty getting his Lamictal in the morning instead of the evening prior to bed when it was presented to him. This was part of the regime the patient had with his at home medications. The patient denies any SI/HI, AVH, rich, anxiety or depressive symptoms.    Review of Systems   Pertinent items are noted in HPI    History    Past Psychiatric History:  Bipolar Disorder, unspecified  PTSD  -Psychiatric Hospitalizations: Once at [facility] in 2014  -Suicide Attempts: None  -Currently managed by: Gloria Paniagua at [facility].    Past Medical History:   Diagnosis Date   • Acne varioliformis    • Acute reaction to stress    • Allergic rhinitis    • Allergy to alpha-gal    • Anxiety    • Anxiety    • Costochondritis 03/04/2013   • Depression    • Gastroesophageal reflux disease with esophagitis    • History of kidney stones    • Migraine with aura     • PUD (peptic ulcer disease)           Family History   Problem Relation Age of Onset   • Diabetes Father         passed away due to complications   • Coronary artery disease Father    • Mental illness Neg Hx    • Alcohol abuse Neg Hx    • Drug abuse Neg Hx         Social History     Tobacco Use   • Smoking status: Never Smoker   • Smokeless tobacco: Never Used   Vaping Use   • Vaping Use: Never used   Substance Use Topics   • Alcohol use: Yes     Alcohol/week: 14.0 standard drinks     Types: 14 Cans of beer per week   • Drug use: Defer          Medications Prior to Admission   Medication Sig Dispense Refill Last Dose   • cetirizine (zyrTEC) 10 MG tablet Take 10 mg by mouth Daily.      • doxycycline (MONODOX) 100 MG capsule Take 1 capsule by mouth 2 (Two) Times a Day for 14 days. 28 capsule 0    • EPINEPHrine (EPIPEN) 0.3 MG/0.3ML solution auto-injector injection Inject 0.3 mg into the appropriate muscle as directed by prescriber.      • famotidine (PEPCID) 20 MG tablet Take 40 mg by mouth 2 (Two) Times a Day.      • lamoTRIgine (LaMICtal) 200 MG tablet Take 200 mg by mouth Every Night.      • LORazepam (ATIVAN) 1 MG tablet Take 1 mg by mouth 2 (Two) Times a Day As Needed.  2    • multivitamin (ONE-A-DAY MENS PO) Take 1 tablet by mouth Daily.      • QUEtiapine (SEROquel) 100 MG tablet Take 400 mg by mouth Every Night. 400 mg nightly      • QUEtiapine (SEROquel) 100 MG tablet Take 100 mg by mouth Daily. Takes 100 mg every morning      • venlafaxine 225 MG tablet sustained-release 24 hour 24 hr tablet Take 225 mg by mouth Daily With Breakfast.           Scheduled Meds:  cetirizine, 10 mg, Oral, Daily  diphenhydrAMINE, 12.5 mg, Intravenous, Q12H  doxycycline, 100 mg, Oral, Q12H  famotidine, 40 mg, Oral, Daily  fondaparinux, 2.5 mg, Subcutaneous, Q24H  insulin lispro, 0-7 Units, Subcutaneous, 4x Daily With Meals & Nightly  [START ON 8/4/2021] lamoTRIgine, 200 mg, Oral, Daily  multivitamin, 1 tablet, Oral,  "Daily  predniSONE, 40 mg, Oral, Daily With Breakfast  QUEtiapine, 100 mg, Oral, Daily  QUEtiapine, 400 mg, Oral, Nightly  sodium chloride, 10 mL, Intravenous, Q12H  venlafaxine XR, 225 mg, Oral, Daily With Breakfast         Continuous Infusions:       PRN Meds:  •  acetaminophen **OR** acetaminophen  •  dextrose  •  dextrose  •  glucagon (human recombinant)  •  insulin lispro **AND** insulin lispro  •  LORazepam **OR** LORazepam **OR** LORazepam **OR** LORazepam **OR** LORazepam **OR** LORazepam  •  nitroglycerin  •  ondansetron **OR** ondansetron  •  [COMPLETED] Insert peripheral IV **AND** sodium chloride  •  sodium chloride      Allergies:  Bee venom, Beef-derived products, Latex, Milk protein, Other, and Pork-derived products    Objective       Physical Exam:    Vital Signs:   /91 (BP Location: Left arm, Patient Position: Lying)   Pulse 83   Temp 98.8 °F (37.1 °C) (Oral)   Resp 14   Ht 182.9 cm (72\")   Wt 73.3 kg (161 lb 9.6 oz)   SpO2 100%   BMI 21.92 kg/m²     General Appearance:    Well-developed, well-nourished, pleasant, cooperative, and in NAD.   Head:    Normocephalic, without obvious deformity, atraumatic.   Eyes:            Lids and lashes normal, conjunctivae and sclerae normal, no   icterus, no pallor, corneas clear.   Skin:  Neurologic:  Musculoskeletal:   No bleeding, bruising or rash.  Cranial nerves 2 - 12 grossly intact, sensation intact.  Muscle strength: grade 5  Muscle tone: Normal  Abnormal Movements: No tremors or abnormal involuntary movements  Gait: Deferred, in bed     Mental Status Exam:   Orientation:  To person, Place, Time and Situation  Memory: Recent and remote memory Intact  Mood/Affect: Euthymic  Suicidal Ideations: None  Homicidal Ideations:  None  Hallucinations: None  Delusions:  None  Obsessions: None  Behavior and Psychomotor Activity: Appropriate  Speech:  Normal  Thought Process: Goal directed  Associations: Intact  Thought Content: Normal  Language: " Normal  Concentration and computation: Fair  Attention Span: Good  Fund of Knowledge: Fair  Reliability: fair  Insight into mental health: Good  Judgement: Good  Impulse Control:  Good  Hygiene: good  Cooperation:  Cooperative  Eye Contact:  Good    Medications and allergies reviewed.    Lab Results   Component Value Date    GLUCOSE 117 (H) 08/03/2021    CALCIUM 8.8 08/03/2021     08/03/2021    K 4.1 08/03/2021    CO2 28.0 08/03/2021     08/03/2021    BUN 19 08/03/2021    CREATININE 0.96 08/03/2021    EGFRIFAFRI 104 07/23/2021    EGFRIFNONA 88 08/03/2021    BCR 19.8 08/03/2021    ANIONGAP 6.0 08/03/2021       Last Urine Toxicity     LAST URINE TOXICITY RESULTS Latest Ref Rng & Units 7/31/2021    BARBITURATES SCREEN Negative Negative    BENZODIAZEPINE SCREEN, URINE Negative Negative    COCAINE SCREEN, URINE Negative Negative    METHADONE SCREEN, URINE Negative Negative          No results found for: PHENYTOIN, PHENOBARB, VALPROATE, CBMZ    Lab Results   Component Value Date     08/03/2021    BUN 19 08/03/2021    CREATININE 0.96 08/03/2021    TSH 1.660 07/31/2021    WBC 11.40 (H) 08/03/2021       Brief Urine Lab Results  (Last result in the past 365 days)      Color   Clarity   Blood   Leuk Est   Nitrite   Protein   CREAT   Urine HCG        07/31/21 2132 Yellow Clear Negative Negative Negative Negative               Assessment/Plan       Anxiety    Gastroesophageal reflux disease with esophagitis    PUD (peptic ulcer disease)    Anaphylaxis due to food    Respiratory arrest (CMS/HCC)    Aspiration pneumonia (CMS/[facility])    Sepsis without septic shock (CMS/[facility])    Allergic reaction to alpha-gal       LABS: Reviewed.    Assessment:  Controlled Bipolar Disorder, unspecified  Controlled PTSD    Treatment Plan:  -The patient's current psychiatric medication regimen from home was confirmed with the patient and his wife.  -Appropriate changes were made as needed to the patients hospital regimen to assure the  patient continues to receive maintenance medications while hospitalized.  -Buspirone PRN was removed from the patients medication list since it is not part of his established psychiatric regimen.    Treatment Plan discussed with: Patient and Family    I discussed the patients findings and my recommendations with patient, family and nursing staff    I have reviewed and approved the behavioral health treatment plans and problem list. Yes  Thank you for the consult  Referring MD has access to consult report and progress notes in EMR  Patient was examined wearing appropriate PPE.    Ryland Ames PA-C  08/03/21  23:22 EDT    EMR Dragon transcription disclaimer:  Some of this encounter note is an electronic transcription translation of spoken language to printed text. The electronic translation of spoken language may permit erroneous, or at times, nonsensical words or phrases to be inadvertently transcribed; Although I have reviewed the note for such errors some may still exist.

## 2021-08-04 ENCOUNTER — READMISSION MANAGEMENT (OUTPATIENT)
Dept: CALL CENTER | Facility: HOSPITAL | Age: 41
End: 2021-08-04

## 2021-08-04 VITALS
BODY MASS INDEX: 21.89 KG/M2 | HEART RATE: 76 BPM | OXYGEN SATURATION: 99 % | WEIGHT: 161.6 LBS | TEMPERATURE: 98 F | DIASTOLIC BLOOD PRESSURE: 87 MMHG | SYSTOLIC BLOOD PRESSURE: 125 MMHG | RESPIRATION RATE: 16 BRPM | HEIGHT: 72 IN

## 2021-08-04 LAB
ALBUMIN SERPL-MCNC: 3.9 G/DL (ref 3.5–5.2)
ALBUMIN/GLOB SERPL: 2.1 G/DL
ALP SERPL-CCNC: 47 U/L (ref 39–117)
ALT SERPL W P-5'-P-CCNC: 23 U/L (ref 1–41)
ANION GAP SERPL CALCULATED.3IONS-SCNC: 7 MMOL/L (ref 5–15)
AST SERPL-CCNC: 20 U/L (ref 1–40)
BASOPHILS # BLD AUTO: 0 10*3/MM3 (ref 0–0.2)
BASOPHILS NFR BLD AUTO: 0.3 % (ref 0–1.5)
BILIRUB SERPL-MCNC: 0.4 MG/DL (ref 0–1.2)
BUN SERPL-MCNC: 18 MG/DL (ref 6–20)
BUN/CREAT SERPL: 17.6 (ref 7–25)
CALCIUM SPEC-SCNC: 8.9 MG/DL (ref 8.6–10.5)
CHLORIDE SERPL-SCNC: 106 MMOL/L (ref 98–107)
CO2 SERPL-SCNC: 30 MMOL/L (ref 22–29)
CREAT SERPL-MCNC: 1.02 MG/DL (ref 0.76–1.27)
DEPRECATED RDW RBC AUTO: 43.3 FL (ref 37–54)
EOSINOPHIL # BLD AUTO: 0 10*3/MM3 (ref 0–0.4)
EOSINOPHIL NFR BLD AUTO: 0.3 % (ref 0.3–6.2)
ERYTHROCYTE [DISTWIDTH] IN BLOOD BY AUTOMATED COUNT: 12.9 % (ref 12.3–15.4)
GFR SERPL CREATININE-BSD FRML MDRD: 82 ML/MIN/1.73
GLOBULIN UR ELPH-MCNC: 1.9 GM/DL
GLUCOSE BLDC GLUCOMTR-MCNC: 116 MG/DL (ref 70–105)
GLUCOSE BLDC GLUCOMTR-MCNC: 87 MG/DL (ref 70–105)
GLUCOSE SERPL-MCNC: 91 MG/DL (ref 65–99)
HCT VFR BLD AUTO: 42.7 % (ref 37.5–51)
HGB BLD-MCNC: 14.4 G/DL (ref 13–17.7)
LYMPHOCYTES # BLD AUTO: 2.3 10*3/MM3 (ref 0.7–3.1)
LYMPHOCYTES NFR BLD AUTO: 23.3 % (ref 19.6–45.3)
MAGNESIUM SERPL-MCNC: 2.1 MG/DL (ref 1.6–2.6)
MCH RBC QN AUTO: 32.7 PG (ref 26.6–33)
MCHC RBC AUTO-ENTMCNC: 33.8 G/DL (ref 31.5–35.7)
MCV RBC AUTO: 96.9 FL (ref 79–97)
MONOCYTES # BLD AUTO: 1.2 10*3/MM3 (ref 0.1–0.9)
MONOCYTES NFR BLD AUTO: 11.9 % (ref 5–12)
NEUTROPHILS NFR BLD AUTO: 6.4 10*3/MM3 (ref 1.7–7)
NEUTROPHILS NFR BLD AUTO: 64.2 % (ref 42.7–76)
NRBC BLD AUTO-RTO: 0.1 /100 WBC (ref 0–0.2)
PHOSPHATE SERPL-MCNC: 2.5 MG/DL (ref 2.5–4.5)
PLATELET # BLD AUTO: 225 10*3/MM3 (ref 140–450)
PMV BLD AUTO: 8.1 FL (ref 6–12)
POTASSIUM SERPL-SCNC: 4 MMOL/L (ref 3.5–5.2)
PROT SERPL-MCNC: 5.8 G/DL (ref 6–8.5)
RBC # BLD AUTO: 4.41 10*6/MM3 (ref 4.14–5.8)
SODIUM SERPL-SCNC: 143 MMOL/L (ref 136–145)
WBC # BLD AUTO: 10 10*3/MM3 (ref 3.4–10.8)

## 2021-08-04 PROCEDURE — 83735 ASSAY OF MAGNESIUM: CPT | Performed by: NURSE PRACTITIONER

## 2021-08-04 PROCEDURE — 63710000001 PREDNISONE PER 1 MG: Performed by: INTERNAL MEDICINE

## 2021-08-04 PROCEDURE — 99239 HOSP IP/OBS DSCHRG MGMT >30: CPT | Performed by: FAMILY MEDICINE

## 2021-08-04 PROCEDURE — 85025 COMPLETE CBC W/AUTO DIFF WBC: CPT | Performed by: NURSE PRACTITIONER

## 2021-08-04 PROCEDURE — 80053 COMPREHEN METABOLIC PANEL: CPT | Performed by: NURSE PRACTITIONER

## 2021-08-04 PROCEDURE — 25010000002 DIPHENHYDRAMINE PER 50 MG: Performed by: NURSE PRACTITIONER

## 2021-08-04 PROCEDURE — 84100 ASSAY OF PHOSPHORUS: CPT | Performed by: NURSE PRACTITIONER

## 2021-08-04 PROCEDURE — 82962 GLUCOSE BLOOD TEST: CPT

## 2021-08-04 RX ORDER — LORAZEPAM 1 MG/1
1 TABLET ORAL EVERY 12 HOURS PRN
Status: DISCONTINUED | OUTPATIENT
Start: 2021-08-04 | End: 2021-08-04 | Stop reason: HOSPADM

## 2021-08-04 RX ORDER — LEVALBUTEROL TARTRATE 45 UG/1
1-2 AEROSOL, METERED ORAL EVERY 4 HOURS PRN
Qty: 1 EACH | Refills: 0 | Status: SHIPPED | OUTPATIENT
Start: 2021-08-04

## 2021-08-04 RX ORDER — PREDNISONE 10 MG/1
TABLET ORAL
Qty: 18 TABLET | Refills: 0 | Status: SHIPPED | OUTPATIENT
Start: 2021-08-04 | End: 2021-08-13

## 2021-08-04 RX ADMIN — ACETAMINOPHEN 650 MG: 325 TABLET, FILM COATED ORAL at 12:20

## 2021-08-04 RX ADMIN — FAMOTIDINE 40 MG: 20 TABLET ORAL at 09:08

## 2021-08-04 RX ADMIN — Medication 10 ML: at 09:19

## 2021-08-04 RX ADMIN — DIPHENHYDRAMINE HYDROCHLORIDE 12.5 MG: 50 INJECTION, SOLUTION INTRAMUSCULAR; INTRAVENOUS at 09:07

## 2021-08-04 RX ADMIN — VENLAFAXINE HYDROCHLORIDE 225 MG: 75 CAPSULE, EXTENDED RELEASE ORAL at 09:07

## 2021-08-04 RX ADMIN — QUETIAPINE FUMARATE 100 MG: 100 TABLET ORAL at 09:07

## 2021-08-04 RX ADMIN — CETIRIZINE HYDROCHLORIDE 10 MG: 10 TABLET, FILM COATED ORAL at 09:09

## 2021-08-04 RX ADMIN — PREDNISONE 40 MG: 20 TABLET ORAL at 09:08

## 2021-08-04 RX ADMIN — THERA TABS 1 TABLET: TAB at 09:08

## 2021-08-04 RX ADMIN — DOXYCYCLINE 100 MG: 100 TABLET, FILM COATED ORAL at 09:10

## 2021-08-04 RX ADMIN — LORAZEPAM 1 MG: 1 TABLET ORAL at 12:21

## 2021-08-04 RX ADMIN — LAMOTRIGINE 200 MG: 100 TABLET ORAL at 09:08

## 2021-08-04 NOTE — OUTREACH NOTE
Prep Survey      Responses   [facility] facility patient discharged from?  [facility]   Is LACE score < 7 ?  No   Emergency Room discharge w/ pulse ox?  No   Eligibility  TCM   Hospital  [facility]   Date of Admission  07/31/21   Date of Discharge  08/04/21   Discharge Disposition  Home or Self Care   Discharge diagnosis  Resp failure requiring reintubation   Does the patient have one of the following disease processes/diagnoses(primary or secondary)?  Other   Does the patient have Home health ordered?  No   Is there a DME ordered?  No   Prep survey completed?  Yes          Arlyn Giang RN

## 2021-08-04 NOTE — CASE MANAGEMENT/SOCIAL WORK
Continued Stay Note  KURTIS Edward     Patient Name: Warren Giraldo  MRN: [id number]  Today's Date: 8/4/2021    Admit Date: 7/31/2021           Plan    Final Discharge Disposition Code  01 - home or self-care    Final Note  return home       Carol naegele rn  Case management  Office number [phone]  Cell phone [phone]

## 2021-08-04 NOTE — PLAN OF CARE
Goal Outcome Evaluation:    Pt slept well throughout the night, family will bring in food for pt d/t allergies. No other issues at this time, will continue to monitor.

## 2021-08-04 NOTE — DISCHARGE SUMMARY
[facility]  DISCHARGE SUMMARY    Patient Name: Warren Giraldo  : 1980  MRN: [id number]    Date of Admission: 2021  Date of Discharge: 2021  Primary Care Physician: Baldev Sandy MD      Presenting Problem:   Acute respiratory failure, unspecified whether with hypoxia or hypercapnia (CMS/HCC) [J96.00]  Allergic reaction to alpha-gal [T78.1XXA]    Active and Resolved Hospital Problems:  Active Hospital Problems    Diagnosis POA   • Allergic reaction to alpha-gal [T78.1XXA] Yes   • Anaphylaxis due to food [T78.00XA] Yes   • Respiratory arrest (CMS/HCC) [R09.2] Yes   • Aspiration pneumonia (CMS/HCC) [J69.0] Unknown   • Sepsis without septic shock (CMS/HCC) [A41.9] Yes   • PUD (peptic ulcer disease) [K27.9] Yes   • Anxiety [F41.9] Yes   • Gastroesophageal reflux disease with esophagitis [K21.00] Yes      Resolved Hospital Problems    Diagnosis POA   • **Acute respiratory failure requiring reintubation (CMS/HCC) [J96.00] No   • Mixed anxiety depressive disorder [F41.8] Yes   • Depressive disorder [F32.9] Yes     Anaphylactic reaction-thought to be due to alpha gal reaction at this time, patient has required antihistamines epinephrine and steroids  -Airway clear at this time  -Family requesting nutrition consult to discuss dietary changes, discussed that patient may benefit from seeing an allergist on an outpatient basis will need nutrition consult with PCP  -Continue steroids     Acute respiratory failure-with hypoxia secondary to possible anaphylactic reaction, patient now extubated on room air  -Family reports patient has severe reaction to albuterol requesting only Xopenex  -Continue steroid taper at home  -Patient initially failed extubation required reintubation then self extubated  -Continues on room air  -No signs of aspiration     Fever-transient of 102 overnight on 2021, family reports that patient has had fevers previously with  albuterol and is currently refusing it cannot rule out any underlying pulmonary infection from aspiration  -CT chest on admission showing no infiltrates  -Repeat chest x-rays with intubations showing no infiltrates  -Family is reporting drug reaction  -Blood cultures no growth x2 days  -Covid negative  -If fever returns check for DVTs  -Patient sent home with Xopenex prescription     Leukocytosis-most likely secondary to prolonged glucocorticoids  -Monitor closely  -No further fevers     Lactic acidosis-has since resolved uncertain if due to sympathetic overdrive versus intravascular depletion  -No further monitoring     Nephrolithiasis-noted incidentally on CT imaging, renal ultrasound showing nonobstructive process  -Patient asymptomatic, continue to monitor outpatient     GERD/anxiety/depression-chronic in nature  -Resume home medication     Hospital Course     Hospital Course:    HPI taken from pulmonary note and edited:   History of Present Illness: Warren Giraldo is a 40 y.o. male who presented to [facility] ED on 7/31/2021 via EMS after collapse at home.  Patient has severe alpha gal allergy so he is allergic to any mammal product. The allergy started in November 2020 from a tick-bite. The patient had chicken salad at home on the day of admission 7/31/2021. The wife believes there was some cross-contamination of mammal products in the chicken salad. She witnessed the patient collapse at home. Per wife patient was not breathing and she was unsure if she felt a pulse.  She gave patient epi and attempted to deliver breaths; however, did not feel like it was working.  She began chest compressions while on the phone with EMS. When EMS arrived the patient was found to have a pulse and was moving around although not following commands and was vomiting.  Patient was given additional Benadryl IV and Zofran in route.  Wife states that EMS attempted to intubate patient however they were unsuccessful.    Patient was given sepsis bolus in ED and was intubated in ED for airway protection. The wife stated the patient had an anaphylactic reaction approximately two weeks ago and recovered with epi injection. He is being managed by [facility] for his alpha gal allergy; however, they are seeking a specialist more familiar to this allergy. Patient had another recent tick bit and had negative tick panel. He has been on doxycycline empirically as an outpatient.      In the ED, labs were obtained with abnormalities as follows: Glucose 110, ionized calcium 0.83, PTT 20.6.  ABG: pH 7.525, PCO2 22.6, PO2 114.6, HCO3 18.7.  UA negative for UTI.  CXR was negative for acute cardiopulmonary normalities.  CT head without contrast was negative for acute intracranial abnormality.  CT abdomen pelvis showed stomach distention with air.  Double areas of cortical hypoenhancement involving both kidneys, which could be related to hypotension, acute tubular necrosis, or polynephritis.  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  No shortness of air oxygen being weaned on Precedex drip.  Patient with fever and transient tachycardia overnight no obvious source of infection, chest x-ray obtained showing no infiltrates.    8/3/2021: Patient on room air off Precedex.  Patient evaluated by nutrition given patient's severe allergies.  Able to be transferred out of ICU hospitalist consulted for medical management.  Patient will be evaluated by psychiatry given multiple mental health comorbidities.    8/4/2021: Patient continues to do well on room air.  No chest pain or shortness of breath.  Psychiatry cleared patient for discharge with outpatient follow-up.  Patient and spouse are going to follow-up with patient's allergist regarding patient's dietary choices.   Patient cleared by pulmonology for discharge and will continue a prednisone steroid taper for the next 9 days.  Patient given prescription for Xopenex inhaler given patient has had bad reactions to albuterol previously.  Patient able to be discharged home in good condition with strict return precautions given.            DISCHARGE Follow Up Recommendations for labs and diagnostics: Review of diet with allergist      Reasons For Change In Medications and Indications for New Medications:    Xopenex inhaler every 4 to 6 hours as needed for wheezing or shortness of breath  Prednisone taper for anaphylactic reaction    Day of Discharge     Vital Signs:  Temp:  [98.2 °F (36.8 °C)-98.8 °F (37.1 °C)] 98.2 °F (36.8 °C)  Heart Rate:  [71-83] 71  Resp:  [14] 14  BP: (132-134)/(87-91) 134/87    Physical Exam:  Physical Exam   General: Male lying in bed breathing comfortably on room air no acute distress  HEENT: NC/AT, EOMI, mucosa moist  Heart: Regular, rate controlled  Chest: Normal work of breathing, moving air well no wheezing  Abdominal: Soft. NT/ND.   Musculoskeletal: Normal ROM.  No edema. No calf tenderness.  Neurological: AAOx3, no focal deficits  Skin: Skin is warm and dry. No rash  Psychiatric: Normal mood and affect.    Pertinent  and/or Most Recent Results     LAB RESULTS:      Lab 08/04/21  0408 08/03/21  1236 08/03/21  0736 08/02/21  2341 08/02/21  1904 08/02/21  1302 08/02/21  0610 08/02/21  0610 08/01/21  0641 08/01/21  0446 08/01/21  0101 07/31/21  2130   WBC 10.00  --  11.40*  --   --   --   --  10.10  --  8.30  --  8.00   HEMOGLOBIN 14.4  --  13.1  --   --   --   --  14.1  --  14.7  --  16.2   HEMATOCRIT 42.7  --  39.2  --   --   --   --  41.5  --  43.7  --  47.9   PLATELETS 225  --  217  --   --   --   --  237  --  286  --  314   NEUTROS ABS 6.40  --  9.60*  --   --   --   --  9.10*  --  7.80*  --  4.50   LYMPHS ABS 2.30  --  0.90  --   --   --   --  0.30*  --  0.40*  --  2.50   MONOS ABS 1.20*  --  0.90  --    --   --   --  0.60  --  0.10  --  0.90   EOS ABS 0.00  --  0.00  --   --   --   --  0.00  --  0.00  --  0.10   MCV 96.9  --  96.6  --   --   --   --  96.3  --  96.4  --  96.6   CRP  --   --   --   --   --   --   --   --   --  <0.30  --   --    PROCALCITONIN  --   --   --   --   --   --   --   --   --  <0.02  --  0.03   LACTATE  --  1.1 1.2 3.1* 2.7* 1.9   < > 2.3*   < >  --    < >  --    PROTIME  --   --   --   --   --   --   --   --   --   --   --  11.2   APTT  --   --   --   --   --   --   --   --   --   --   --  20.6*    < > = values in this interval not displayed.         Lab 08/04/21 0408 08/03/21 0736 08/02/21 0610 08/01/21 0446 07/31/21  2130   SODIUM 143 140 141 139 143   POTASSIUM 4.0 4.1 4.8 4.8 4.2   CHLORIDE 106 106 106 105 106   CO2 30.0* 28.0 26.0 23.0 20.0*   ANION GAP 7.0 6.0 9.0 11.0 17.0*   BUN 18 19 15 12 12   CREATININE 1.02 0.96 1.04 0.99 1.23   GLUCOSE 91 117* 151* 129* 99   CALCIUM 8.9 8.8 8.8 8.3* 9.5   MAGNESIUM 2.1 2.2 2.2 2.0 2.2   PHOSPHORUS 2.5 3.4 3.1 3.5  --    TSH  --   --   --   --  1.660         Lab 08/04/21 0408 08/03/21 0736 08/02/21 0610 08/01/21 0446 07/31/21  2130   TOTAL PROTEIN 5.8* 5.5* 5.9* 6.1 7.0   ALBUMIN 3.90 3.80 4.10 4.30 4.70   GLOBULIN 1.9 1.7 1.8 1.8 2.3   ALT (SGPT) 23 18 20 21 24   AST (SGOT) 20 16 19 20 23   BILIRUBIN 0.4 0.6 0.4 0.2 0.3   ALK PHOS 47 44 51 51 53   LIPASE  --   --   --   --  29         Lab 07/31/21 2130   TROPONIN T <0.010   PROTIME 11.2   INR 1.01                 Lab 08/01/21  0433 07/31/21 2124   PH, ARTERIAL 7.281* 7.525*   PCO2, ARTERIAL 54.8* 22.6*   PO2 .1* 114.6*   O2 SATURATION ART 99.6* 99.0*   FIO2 50 40   HCO3 ART 25.8 18.7*   BASE EXCESS ART -1.9* -2.0*     Brief Urine Lab Results  (Last result in the past 365 days)      Color   Clarity   Blood   Leuk Est   Nitrite   Protein   CREAT   Urine HCG        07/31/21 2132 Yellow Clear Negative Negative Negative Negative             Microbiology Results (last 10 days)      Procedure Component Value - Date/Time    COVID PRE-OP / PRE-PROCEDURE SCREENING ORDER (NO ISOLATION) - Swab, Nasopharynx [[id number]]  (Normal) Collected: 07/31/21 2305    Lab Status: Final result Specimen: Swab from Nasopharynx Updated: 08/01/21 0030    Narrative:      The following orders were created for panel order COVID PRE-OP / PRE-PROCEDURE SCREENING ORDER (NO ISOLATION) - Swab, Nasopharynx.  Procedure                               Abnormality         Status                     ---------                               -----------         ------                     COVID-19,CEPHEID,COR/JENNIFER...[[id number]]  Normal              Final result                 Please view results for these tests on the individual orders.    COVID-19,CEPHEID,COR/JENNIFER/PAD/DIANDRA IN-HOUSE(OR EMERGENT/ADD-ON),NP SWAB IN TRANSPORT MEDIA 3-4 HR TAT, RT-PCR - Swab, Nasopharynx [[id number]]  (Normal) Collected: 07/31/21 2305    Lab Status: Final result Specimen: Swab from Nasopharynx Updated: 08/01/21 0030     COVID19 Not Detected    Narrative:      Fact sheet for providers: https://www.fda.gov/media/270721/download     Fact sheet for patients: https://www.fda.gov/media/724670/download  Fact sheet for providers: https://www.fda.gov/media/186697/download     Fact sheet for patients: https://www.fda.gov/media/041897/download    Blood Culture - Blood, Arm, Right [[id number]] Collected: 07/31/21 2232    Lab Status: Preliminary result Specimen: Blood from Arm, Right Updated: 08/03/21 2245     Blood Culture No growth at 3 days    Blood Culture - Blood, Arm, Left [[id number]] Collected: 07/31/21 2227    Lab Status: Preliminary result Specimen: Blood from Arm, Left Updated: 08/03/21 2245     Blood Culture No growth at 3 days          CT Head Without Contrast    Result Date: 7/31/2021  Impression: 1.  No acute intracranial abnormality. No findings of diffuse anoxic brain injury. If there is further concern, suggest repeat exam in 24 hours. 2.  Paranasal sinus  mucosal thickening. Electronically signed by:  Neo Carrillo DO  7/31/2021 8:48 PM    CT Abdomen Pelvis With Contrast    Result Date: 7/31/2021  Impression: 1. The stomach is distended with air, fluid and debris. Etiology is uncertain. 2. Subtle areas of cortical hypoenhancement involving both kidneys. This could be related to hypotension, acute tubular necrosis or pyelonephritis. 3. 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 4. Numerous nonobstructing bilateral renal calculi. 5. Constipation. Slot 63 Electronically signed by:  Leodan Topete M.D.  7/31/2021 8:39 PM    XR Chest 1 View    Result Date: 8/1/2021  Impression:  1. The endotracheal tube is in good position. 2. Placement of a nasogastric tube with the tip terminating in the fundus of the stomach. 3. No active pulmonary disease.  Electronically Signed By-Dread Donaldson MD On:8/1/2021 2:59 PM This report was finalized on [id number] by  Dread Donaldson MD.    XR Chest 1 View    Result Date: 8/1/2021  Impression: No active disease, stable chest x-ray.  Electronically Signed By-Dread Donaldson MD On:8/1/2021 9:31 AM This report was finalized on [id number] by  Dread Donaldson MD.    XR Chest 1 View    Result Date: 7/31/2021  Impression: No acute cardiopulmonary abnormality. Electronically signed by:  Ryland Massey M.D.  7/31/2021 8:40 PM    XR Chest 1 View    Result Date: 7/31/2021  Impression: Impression: 1. Well-positioned endotracheal tube. Slot 63 Electronically signed by:  Leodan Topete M.D.  7/31/2021 8:35 PM    CT Chest Pulmonary Embolism    Result Date: 7/31/2021  Impression: 1. No pulmonary embolus. 2. No acute abnormality. Slot 63 Electronically signed by:  Leodan Topete M.D.  7/31/2021 8:50 PM    US Renal Bilateral    Result Date: 8/1/2021  Impression:  1. 8 mm nonobstructing stone in the right kidney. 2. The additional right renal and left renal calculi seen on the CT exam are not visible. 3. Urinary bladder is  not visible as a Osorio catheter is in place.  Electronically Signed By-Dread Donaldson MD On:8/1/2021 9:47 AM This report was finalized on [id number] by  Dread Donaldson MD.    XR Abdomen KUB    Result Date: 8/1/2021  Impression: The tip of the nasogastric tube terminates within the fundus of the stomach.  Electronically Signed By-Dread Donaldson MD On:8/1/2021 3:00 PM This report was finalized on [id number] by  Dread Donaldson MD.                  Labs Pending at Discharge:  Pending Labs     Order Current Status    Blood Culture - Blood, Arm, Left Preliminary result    Blood Culture - Blood, Arm, Right Preliminary result          Procedures Performed    08/01 1350 Note By: Halima Rubio APRN      Consults:   Consults     Date and Time Order Name Status Description    8/3/2021 10:13 AM Inpatient Hospitalist Consult      8/3/2021  8:59 AM Inpatient Psychiatrist Consult Completed             Discharge Details        Discharge Medications      New Medications      Instructions Start Date   levalbuterol 45 MCG/ACT inhaler  Commonly known as: XOPENEX HFA   1-2 puffs, Inhalation, Every 4 Hours PRN      predniSONE 10 MG tablet  Commonly known as: DELTASONE   Take 3 tablets by mouth Daily for 3 days, THEN 2 tablets Daily for 3 days, THEN 1 tablet Daily for 3 days.   Start Date: August 4, 2021        Continue These Medications      Instructions Start Date   cetirizine 10 MG tablet  Commonly known as: zyrTEC   10 mg, Oral, Daily      doxycycline 100 MG capsule  Commonly known as: MONODOX   100 mg, Oral, 2 Times Daily      EPINEPHrine 0.3 MG/0.3ML solution auto-injector injection  Commonly known as: EPIPEN   0.3 mg, Intramuscular      famotidine 20 MG tablet  Commonly known as: PEPCID   40 mg, Oral, 2 Times Daily      lamoTRIgine 200 MG tablet  Commonly known as: LaMICtal   200 mg, Oral, Nightly      LORazepam 1 MG tablet  Commonly known as: ATIVAN   1 mg, Oral, 2 Times Daily PRN      multivitamin tablet tablet   1 tablet, Oral, Daily       QUEtiapine 100 MG tablet  Commonly known as: SEROquel   400 mg, Oral, Nightly, 400 mg nightly       QUEtiapine 100 MG tablet  Commonly known as: SEROquel   100 mg, Oral, Daily, Takes 100 mg every morning       venlafaxine 225 MG tablet sustained-release 24 hour 24 hr tablet   225 mg, Oral, Daily With Breakfast             Allergies   Allergen Reactions   • Bee Venom Anaphylaxis   • Beef-Derived Products Anaphylaxis     Alpha-gal   • Latex Anaphylaxis   • Milk Protein Anaphylaxis     Alpha-Gal   • Other Anaphylaxis     Has Alpha gal so any mammal products   • Pork-Derived Products Anaphylaxis     Alpha-gal           Discharge Disposition:  Home or Self Care    Diet:  Hospital:  Diet Order   Procedures   • Diet Regular         Discharge Activity:         CODE STATUS:  Code Status and Medical Interventions:   Ordered at: 07/31/21 1338     Code Status:    CPR     Medical Interventions (Level of Support Prior to Arrest):    Full         No future appointments.    Additional Instructions for the Follow-ups that You Need to Schedule     Discharge Follow-up with PCP   As directed       Currently Documented PCP:    Baldev Sandy MD    PCP Phone Number:    [phone]     Follow Up Details: Please follow-up with your primary care doctor within a week to monitor your breathing and any residual wheezing         Discharge Follow-up with Specified Provider: Please follow-up with your allergist in 2 to 4 weeks to discuss your allergies   As directed      To: Please follow-up with your allergist in 2 to 4 weeks to discuss your allergies               Time spent on Discharge including face to face service:  35 minutes    This patient has been examined wearing appropriate Personal Protective Equipment and discussed with hospital infection control department. 08/04/21      Signature: Electronically signed by Sarbjit Goyal MD, 08/04/21, 1:06 PM EDT.

## 2021-08-04 NOTE — PROGRESS NOTES
"PULMONARY CRITICAL CARE Progress  NOTE      PATIENT IDENTIFICATION:  Name: Warren Giraldo  MRN: [id number]  :  1980     Age: 40 y.o.  Sex: male    DATE OF Note:  2021   Referring Physician: Priscilla Newman MD                  Subjective:   Feeling better, no new issue, no SOB no chest pain, no nausea or vomiting, no change in bowel habit, no dysuria,  no new  skin rash or itching.      Objective:  tMax 24 hrs: Temp (24hrs), Av.3 °F (36.8 °C), Min:98 °F (36.7 °C), Max:98.8 °F (37.1 °C)      Vitals Ranges:   Temp:  [98 °F (36.7 °C)-98.8 °F (37.1 °C)] 98 °F (36.7 °C)  Heart Rate:  [71-83] 76  Resp:  [14-16] 16  BP: (125-134)/(87-91) 125/87    Intake and Output Last 3 Shifts:   I/O last 3 completed shifts:  In: 600 [P.O.:600]  Out:  [Urine:]    Exam:  /87 (BP Location: Left arm, Patient Position: Lying)   Pulse 76   Temp 98 °F (36.7 °C) (Oral)   Resp 16   Ht 182.9 cm (72\")   Wt 73.3 kg (161 lb 9.6 oz)   SpO2 99%   BMI 21.92 kg/m²     General Appearance:     HEENT:  Normocephalic, without obvious abnormality, Conjunctiva/corneas clear,.  Normal external ear canals, Nares normal, no drainage     Neck:  Supple, symmetrical, trachea midline. No JVD.  Lungs /Chest wall:   Bilateral basal rhonchi, respirations unlabored symmetrical wall movement.     Heart:  Regular rate and rhythm, systolic murmur PMI left sternal border  Abdomen: Soft, non-tender, no masses, no organomegaly.    Extremities: Trace edema no clubbing or Cyanosis        Medications:  No current facility-administered medications for this encounter.    Current Outpatient Medications:   •  cetirizine (zyrTEC) 10 MG tablet, Take 10 mg by mouth Daily., Disp: , Rfl:   •  doxycycline (MONODOX) 100 MG capsule, Take 1 capsule by mouth 2 (Two) Times a Day for 14 days., Disp: 28 capsule, Rfl: 0  •  EPINEPHrine (EPIPEN) 0.3 MG/0.3ML solution auto-injector injection, Inject 0.3 mg into the appropriate muscle as directed by prescriber., " Disp: , Rfl:   •  famotidine (PEPCID) 20 MG tablet, Take 40 mg by mouth 2 (Two) Times a Day., Disp: , Rfl:   •  lamoTRIgine (LaMICtal) 200 MG tablet, Take 200 mg by mouth Every Night., Disp: , Rfl:   •  LORazepam (ATIVAN) 1 MG tablet, Take 1 mg by mouth 2 (Two) Times a Day As Needed., Disp: , Rfl: 2  •  multivitamin (ONE-A-DAY MENS PO), Take 1 tablet by mouth Daily., Disp: , Rfl:   •  QUEtiapine (SEROquel) 100 MG tablet, Take 400 mg by mouth Every Night. 400 mg nightly, Disp: , Rfl:   •  QUEtiapine (SEROquel) 100 MG tablet, Take 100 mg by mouth Daily. Takes 100 mg every morning, Disp: , Rfl:   •  venlafaxine 225 MG tablet sustained-release 24 hour 24 hr tablet, Take 225 mg by mouth Daily With Breakfast., Disp: , Rfl:   •  levalbuterol (XOPENEX HFA) 45 MCG/ACT inhaler, Inhale 1-2 puffs Every 4 (Four) Hours As Needed for Wheezing., Disp: 1 each, Rfl: 0  •  predniSONE (DELTASONE) 10 MG tablet, Take 3 tablets by mouth Daily for 3 days, THEN 2 tablets Daily for 3 days, THEN 1 tablet Daily for 3 days., Disp: 18 tablet, Rfl: 0    Data Review:  All labs (24hrs):   Recent Results (from the past 24 hour(s))   POC Glucose Once    Collection Time: 08/03/21  8:35 PM    Specimen: Blood   Result Value Ref Range    Glucose 134 (H) 70 - 105 mg/dL   Magnesium    Collection Time: 08/04/21  4:08 AM    Specimen: Blood   Result Value Ref Range    Magnesium 2.1 1.6 - 2.6 mg/dL   Phosphorus    Collection Time: 08/04/21  4:08 AM    Specimen: Blood   Result Value Ref Range    Phosphorus 2.5 2.5 - 4.5 mg/dL   Comprehensive Metabolic Panel    Collection Time: 08/04/21  4:08 AM    Specimen: Blood   Result Value Ref Range    Glucose 91 65 - 99 mg/dL    BUN 18 6 - 20 mg/dL    Creatinine 1.02 0.76 - 1.27 mg/dL    Sodium 143 136 - 145 mmol/L    Potassium 4.0 3.5 - 5.2 mmol/L    Chloride 106 98 - 107 mmol/L    CO2 30.0 (H) 22.0 - 29.0 mmol/L    Calcium 8.9 8.6 - 10.5 mg/dL    Total Protein 5.8 (L) 6.0 - 8.5 g/dL    Albumin 3.90 3.50 - 5.20 g/dL     ALT (SGPT) 23 1 - 41 U/L    AST (SGOT) 20 1 - 40 U/L    Alkaline Phosphatase 47 39 - 117 U/L    Total Bilirubin 0.4 0.0 - 1.2 mg/dL    eGFR Non African Amer 82 >60 mL/min/1.73    Globulin 1.9 gm/dL    A/G Ratio 2.1 g/dL    BUN/Creatinine Ratio 17.6 7.0 - 25.0    Anion Gap 7.0 5.0 - 15.0 mmol/L   CBC Auto Differential    Collection Time: 08/04/21  4:08 AM    Specimen: Blood   Result Value Ref Range    WBC 10.00 3.40 - 10.80 10*3/mm3    RBC 4.41 4.14 - 5.80 10*6/mm3    Hemoglobin 14.4 13.0 - 17.7 g/dL    Hematocrit 42.7 37.5 - 51.0 %    MCV 96.9 79.0 - 97.0 fL    MCH 32.7 26.6 - 33.0 pg    MCHC 33.8 31.5 - 35.7 g/dL    RDW 12.9 12.3 - 15.4 %    RDW-SD 43.3 37.0 - 54.0 fl    MPV 8.1 6.0 - 12.0 fL    Platelets 225 140 - 450 10*3/mm3    Neutrophil % 64.2 42.7 - 76.0 %    Lymphocyte % 23.3 19.6 - 45.3 %    Monocyte % 11.9 5.0 - 12.0 %    Eosinophil % 0.3 0.3 - 6.2 %    Basophil % 0.3 0.0 - 1.5 %    Neutrophils, Absolute 6.40 1.70 - 7.00 10*3/mm3    Lymphocytes, Absolute 2.30 0.70 - 3.10 10*3/mm3    Monocytes, Absolute 1.20 (H) 0.10 - 0.90 10*3/mm3    Eosinophils, Absolute 0.00 0.00 - 0.40 10*3/mm3    Basophils, Absolute 0.00 0.00 - 0.20 10*3/mm3    nRBC 0.1 0.0 - 0.2 /100 WBC   POC Glucose Once    Collection Time: 08/04/21  7:06 AM    Specimen: Blood   Result Value Ref Range    Glucose 87 70 - 105 mg/dL   POC Glucose Once    Collection Time: 08/04/21 11:19 AM    Specimen: Blood   Result Value Ref Range    Glucose 116 (H) 70 - 105 mg/dL        Imaging:  XR Abdomen KUB  Narrative: DATE OF EXAM:  8/1/2021 2:30 PM     PROCEDURE:  XR ABDOMEN KUB-     INDICATIONS:  Nasogastric tube placement.     COMPARISON:  01/17/2015.     TECHNIQUE:   Single radiographic view of the abdomen was obtained.        FINDINGS:  The tip of the nasogastric tube terminates in the fundus of the stomach.  The pelvis and most of the lower abdomen were excluded from the  field-of-view. The visualized bowel gas pattern is unremarkable.      Impression:  The tip of the nasogastric tube terminates within the fundus of the  stomach.     Electronically Signed By-Dread Donaldson MD On:8/1/2021 3:00 PM  This report was finalized on [id number] by  Dread Donaldson MD.  XR Chest 1 View  Narrative: DATE OF EXAM:  8/1/2021 2:35 PM     PROCEDURE:  XR CHEST 1 VW-     INDICATIONS:  Acute respiratory failure, endotracheal tube insertion.      COMPARISON:  08/01/2021 at 0439 hours.     TECHNIQUE:   Single radiographic view of the chest was obtained.     FINDINGS:  The tip of the endotracheal tube is in good position located between the  thoracic inlet and aortic knob. There has been placement of a  nasogastric tube with its tip terminating in the fundus of the stomach.  The heart size is normal. The pulmonary vascular markings are normal.   The lungs and pleural spaces are clear of active disease.     Impression:    1. The endotracheal tube is in good position.  2. Placement of a nasogastric tube with the tip terminating in the  fundus of the stomach.  3. No active pulmonary disease.     Electronically Signed By-Dread Donaldson MD On:8/1/2021 2:59 PM  This report was finalized on [id number] by  Dread Donaldson MD.  US Renal Bilateral  Narrative: Examination: US RENAL BILATERAL-     Date of Exam: 8/1/2021 7:35 AM     Indication: Renal calculi.     Comparison: CT of the abdomen and pelvis performed on 07/31/2021.     Technique: Grayscale and color Doppler ultrasound evaluation of the  kidneys and urinary bladder was performed     Findings:  The right kidney measures 10.6 x 4.9 x 4.9 cm and the left kidney  measures 10.1 x 6.0 x 5.7 cm.The cortical thickness and echogenicity is  normal. There is an 8 mm nonobstructing stone in the right kidney. The  remaining right and left renal calculi are not visible on the  ultrasound. There is no renal mass or hydronephrosis.The patient has a  Osorio catheter in place. The urinary bladder is not visible.     Impression:    1. 8 mm nonobstructing stone in  the right kidney.  2. The additional right renal and left renal calculi seen on the CT exam  are not visible.  3. Urinary bladder is not visible as a Osorio catheter is in place.     Electronically Signed By-Dread Donaldson MD On:8/1/2021 9:47 AM  This report was finalized on [id number] by  Dread Donaldson MD.  XR Chest 1 View  Narrative: DATE OF EXAM:  8/1/2021 5:18 AM     PROCEDURE:  XR CHEST 1 VW-     INDICATIONS:  Acute respiratory failure, endotracheal tube placement, alleged drug  overdose, hypoxia.      COMPARISON:  07/31/2021.     TECHNIQUE:   Single radiographic view of the chest was obtained.     FINDINGS:  Endotracheal tube tip is in good position. The heart size is normal. The  pulmonary vascular markings are normal. The lungs and pleural spaces are  clear of active disease.  The bony thorax is normal for age.     Impression: No active disease, stable chest x-ray.     Electronically Signed By-Dread Donaldson MD On:8/1/2021 9:31 AM  This report was finalized on [id number] by  Dread Donaldson MD.       ASSESSMENT:  Acute respiratory failure  Anaphylactic shock    Anxiety    Gastroesophageal reflux disease with esophagitis    PUD (peptic ulcer disease)    Anaphylaxis due to food    Respiratory arrest (CMS/HCC)    Aspiration pneumonia (CMS/HCC)    Sepsis without septic shock (CMS/HCC)    Allergic reaction to alpha-gal     PLAN:  Okay to DC home follow-up as outpatient  Bronchodilator  Inhaled corticosteroids  Electrolytes/ glycemic control  DVT and GI prophylaxis.    Total Critical care time in direct medical management (   ) minutes  Samson Roman MD. D, ABSM.     8/4/2021  18:40 EDT

## 2021-08-04 NOTE — PAYOR COMM NOTE
"CLINICALS FOR PENDING INPATIENT PRECERT AS REQUESTED.        Meera Giraldo (40 y.o. Male) 1980  PENDING AUTH # [id number]          AUTHORIZATION PENDING:   PLEASE CALL OR FAX DETERMINATION TO CONTACT BELOW. THANK YOU.        Cristine Barrow, RN MSN  /UR  [facility]  [phone] office  [phone] fax  [email or web address]    [facility]  NPI: [id number]  Tax: [phone]          Meera Giraldo (40 y.o. Male)     Date of Birth Social Security Number Address Home Phone MRN    1980  [address] [place] [address] [phone] [id number]    Alevism Marital Status          Confucianist ([facility])        Admission Date Admission Type Admitting Provider Attending Provider Department, Room/Bed    7/31/21 Emergency Draw, MD Jay Jay Wells Timothy Michael, MD [facility] 3C MEDICAL INPATIENT, 375/1    Discharge Date Discharge Disposition Discharge Destination                       Attending Provider: Sarbjit Goyal MD    Allergies: Bee Venom, Beef-derived Products, Latex, Milk Protein, Other, Pork-derived Products    Isolation: None   Infection: None   Code Status: CPR    Ht: 182.9 cm (72\")   Wt: 73.3 kg (161 lb 9.6 oz)    Admission Cmt: None   Principal Problem: Acute respiratory failure requiring reintubation (CMS/[facility]) [J96.00]                 Active Insurance as of 7/31/2021     Primary Coverage     Payor Plan Insurance Group Employer/Plan Group    Critical access hospital [facility] PPO [id number]     Payor Plan Address Payor Plan Phone Number Payor Plan Fax Number Effective Dates    [address] [phone]  1/1/2019 - None Entered    [facility] [id number]       Subscriber Name Subscriber Birth Date Member ID       MEERA GIRALDO 1980 [id number]                 Emergency Contacts      (Rel.) Home Phone Work Phone Mobile Phone    JAVYGARRETT (Spouse) " [phone] -- [phone]        21 0048  Inpatient Admission Once    Completed   Level of Care: Critical Care    Diagnosis: Acute respiratory failure, unspecified whether with hypoxia or hypercapnia (CMS/[facility]) [[id number]]    Admitting Physician: PRISCILLA NEWMAN [[id number]]    Attending Physician: PRISCILLA NEWMAN [[id number]]    Certification: I Certify That Inpatient Hospital Services Are Medically Necessary For Greater Than 2 Midnights            Criteria Review   Verified inpatient order: 218           Admitted from the ER after a collapse at home.   Possible anaphylaxis vs cardiac arrest. Has severe pork and beef allergies.   Possible aspiration PNA.  In the ICU on 4 liters NPPV satting 99%.  Vitals on admission: T98.1 - P109 - R30 - /92  CT head and CT chest negative.   NG tube in place.   Labs: WBC 8.0. Lactate 6.5. pH 7.525. PCO2 22.6 PO2 114.6  Meds: IV benadryl. IV solumedrol.   Drips: Precedex, Fentanyl, and Versed  Clinical documentation supports inpatient status.         Systemic or Infectious Condition GRG  Clinical Indications for Admission to Inpatient Care  Hospital admission[A] is needed for appropriate care of the patient because of 1 or more of the following:   Allergic reaction with severe symptoms that persist despite observation care (as appropriate), including 1 or more of the following(40)(41)(56)(57)(58)(59)(60)(61)(62):  Respiratory distress  Hemodynamic instability              History & Physical      Priscilla Newman MD at 21 2330          PULMONARY/CRITICAL CARE HISTORY & PHYSICAL       PATIENT NAME:     Warren Giraldo  :     1980    MRN:     [id number]       ROOM:     [facility] ED      PRIMARY CARE PHYSICIAN:  Baldev Sandy MD    SUBJECTIVE     CHIEF COMPLAINT:   Respiratory arrest, following anaphylaxis    HISTORY OF PRESENT ILLNESS:  Warren Giraldo is a 40 y.o. male  has a past medical history of Acne varioliformis, Acute reaction to stress, Allergic  rhinitis, Allergy to alpha-gal, Anxiety, Anxiety, Costochondritis (03/04/2013), Depression, Gastroesophageal reflux disease with esophagitis, History of kidney stones, Migraine with aura, and PUD (peptic ulcer disease).   Patient presented [facility] on 7/31/2021 via EMS after collapse at home.  Patient has severe allergies, and wife was at home when patient collapsed.  Per wife patient was not breathing and she was unsure if she felt a pulse.  She gave patient epi and attempted to deliver breaths, however did not feel like it was working.  She began chest compressions while on the phone with EMS.  Patient has beef and pork allergy, and had chicken salad today with which wife believes there may have been some cross-contamination.  Wife states that this is happened before, with most recent allergic reaction happening roughly 2 weeks ago however not this severe.  When EMS arrived patient was found to have a pulse and was moving around although not following commands with vomiting.  Patient was given additional Benadryl IV and Zofran in route.  Wife states that EMS attempted to intubate patient however they were unsuccessful.   Patient was given sepsis bolus in ED and was intubated in ED for airway protection.    In the ED, labs were obtained with abnormalities as follows: Glucose 110, ionized calcium 0.83, PTT 20.6.  ABG: pH 7.525, PCO2 22.6, PO2 114.6, HCO3 18.7.  UA negative for UTI.  CXR was negative for acute cardiopulmonary normalities.  CT head without contrast was negative for acute intracranial abnormality.  CT abdomen pelvis showed stomach distention with air.  Double areas of cortical hypoenhancement involving both kidneys, which could be related to hypotension, acute tubular necrosis, or polynephritis.  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    Pulmonary/Intensivist service was contacted for  admission to ICU and further evaluation and treatment.      REVIEW OF SYSTEMS:  Review of systems could not be obtained due to   patient intubated.      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  -on vent, continue support to keep O2 saturation > 91%  -CXR Reviewed  -EKG Reviewed  -ABG, monitor as ordered  -Duoneb  -Pulmicort    Rule out sepsis without shock, secondary to possible aspiration pneumonia  -Witnessed emesis prior to arrival and in ED  -IVF bolus 30 mL/kg per sepsis protocol given in ED  -Continue IVFs  -Lactic Acid 2.2, monitor and trend labs until normalized.  -Cultures-pending  -UA-negative  -Procalcitonin-pending, CRP-Pending  -Cefepime; deescalate pending culture results    Gastroesophageal reflux disease with esophagitis  Peptic ulcer disease  -Continue home sucralfate when clinically appropriate  -Continue home Pepcid when clinically appropriate    Numerous nonobstructing bilateral renal calculi  -Osorio catheter placed in ED  -Continue fluids  -Renal ultrasound  -Consider nephrology consult if needed    Anxiety  Depressive disorder  -Continue home Seroquel when clinically appropriate  -Continue home Lamictal when clinically appropriate  -Continue home venlafaxine when clinically appropriate    Code Status: FULL  VTE Prophylaxis: SCDs  PUD Prophylaxis: Pepcid      HOSPITAL MEDICATIONS      SCHEDULED MEDICATIONS:  budesonide, 0.5 mg, Nebulization, BID - RT  chlorhexidine, 15 mL, Mouth/Throat, Q12H  famotidine, 20 mg, Intravenous, BID  ipratropium-albuterol, 3 mL, Nebulization, 4x Daily - RT  midazolam, 2 mg, Intravenous, Once  sodium chloride, 10 mL, Intravenous, Q12H         CONTINUOUS INFUSIONS:    fentanyl 10 mcg/mL, 100 mcg/hr, Last Rate: 250 mcg/hr (08/01/21 4534)  propofol, 5-50 mcg/kg/min, Last Rate: 50 mcg/kg/min (08/01/21 5989)         PRN MEDICATIONS:   •  acetaminophen **OR** acetaminophen  •  nitroglycerin  •  ondansetron **OR** ondansetron  •  [COMPLETED] Insert peripheral IV **AND** sodium chloride  •  sodium chloride       OBJECTIVE     VITAL SIGNS:  /82   Pulse 104   Resp (!) 30   SpO2 100%     Wt Readings from Last 3 Encounters:   07/23/21 75.7 kg (166 lb 12.8 oz)   07/21/21 77.1 kg (170 lb)   03/20/20 78.4 kg (172 lb 13.5 oz)       INTAKE/OUTPUT:    Intake/Output Summary (Last 24 hours) at 7/31/2021 2330  Last data filed at 7/31/2021 2327  Gross per 24 hour   Intake 3271 ml   Output --   Net 3271 ml       PHYSICAL EXAM:   Constitutional:  Well developed, well nourished, no acute distress, non-toxic appearance   Eyes:  PERRL, conjunctiva normal, EOMI   HENT:  Atraumatic, external ears normal, nose normal, ETT noted oropharynx moist, no pharyngeal exudates. Neck-normal range of motion, no tenderness  Respiratory: Clear to ascultation bilateral, non-labored respirations without accessory muscle use  Cardiovascular:  Normal rate, normal rhythm, no murmurs, no gallops, no rubs   GI:  Soft, nondistended, hypoactive bowel sounds, nontender, no rebound or guarding   :  No costovertebral angle tenderness, Osorio-catheter noted  Musculoskeletal:  No edema, no tenderness, no deformities  Integument:  Well hydrated, no rash   Neurologic: Unable to assess as patient is intubated and sedated, normal motor function, normal sensory function, no gross motor deficits noted   Psychiatric:  Unable to assess as patient is intubated and sedated.      HISTORY     HISTORY:  Past Medical History:   Diagnosis Date   • Acne varioliformis    • Acute reaction to stress    • Allergic rhinitis    • Allergy to alpha-gal    • Anxiety    • Anxiety    • Costochondritis 03/04/2013   • Depression    • Gastroesophageal reflux disease with esophagitis    • History of kidney stones    • Migraine with aura    • PUD (peptic ulcer disease)      Past Surgical History:   Procedure Laterality Date   • APPENDECTOMY     • CHOLECYSTECTOMY     • HYDROCELE EXCISION / REPAIR Left      Family History   Problem Relation Age of Onset   • Diabetes Father         passed away due to complications   • Coronary artery disease Father    • Mental illness Neg Hx    • Alcohol abuse Neg Hx    • Drug abuse Neg Hx      Social History     Socioeconomic History   • Marital status:      Spouse name: Not on file   • Number of children: Not on file   • Years of education: Not on file   • Highest education level: Not on file   Tobacco Use   • Smoking status: Never Smoker   • Smokeless tobacco: Never Used   Substance and Sexual Activity   • Alcohol use: Yes     Alcohol/week: 3.0 standard drinks     Types: 3 Cans of beer per week   • Drug use: Defer   • Sexual activity: Defer        HOME MEDICATIONS:   Prior to Admission medications    Medication Sig Start Date End Date Taking? Authorizing Provider   albuterol sulfate  (90 Base) MCG/ACT inhaler Inhale 2 puffs Every 4 (Four) Hours As Needed for Wheezing. 7/23/21   Gustavo Escobedo Sr., MD   cetirizine (zyrTEC) 10 MG tablet Take 10 mg by mouth Daily.    Emergency, Nurse FRANSISCO Denson   doxycycline (MONODOX) 100 MG capsule Take 1 capsule by mouth 2 (Two) Times a Day for 14 days. 7/23/21 8/6/21  Gustavo Escobedo Sr., MD   EPINEPHrine (EPIPEN) 0.3 MG/0.3ML solution auto-injector injection Inject 0.3 mg into the appropriate muscle as directed by prescriber. 10/6/17   Emergency, Nurse Janiya RN   famotidine  (PEPCID) 10 MG tablet Take 10 mg by mouth 2 (Two) Times a Day.    Emergency, Nurse Janiya RN   lamoTRIgine (LaMICtal) 100 MG tablet Take 100 mg by mouth Daily.    Emergency, Nurse Janiya RN   LORazepam (ATIVAN) 1 MG tablet TK 1 T PO BID PRN 7/29/19   Emergency, Nurse Janiya RN   multivitamin (ONE-A-DAY MENS PO) Take  by mouth Daily.    Emergency, Nurse FRANSISCO Denson   predniSONE (DELTASONE) 10 MG tablet Start with 4 tablets tomorrow and wean by 1 tablet a day over 4 days 7/21/21   Simone Moulton MD   QUEtiapine XR (SEROQUEL XR) 400 MG 24 hr tablet Take  by mouth Daily.    Emergency, Nurse Janiya RN   venlafaxine XR (EFFEXOR XR) 150 MG 24 hr capsule Take  by mouth.    Emergency, Nurse FRANSISCO Denson     IMMUNIZATIONS:  Immunization History   Administered Date(s) Administered   • Td, Not Adsorbed 12/17/2019        ALLERGIES:  Bee venom, Beef-derived products, Latex, Other, and Pork-derived products      RESULTS     LABS:  Lab Results (last 24 hours)     Procedure Component Value Units Date/Time    Blood Gas, Arterial - [[id number]]  (Abnormal) Collected: 07/31/21 2124    Specimen: Arterial Blood Updated: 07/31/21 2128     Site Right Radial     Rahat's Test Positive     pH, Arterial 7.525 pH units      pCO2, Arterial 22.6 mm Hg      pO2, Arterial 114.6 mm Hg      HCO3, Arterial 18.7 mmol/L      Base Excess, Arterial -2.0 mmol/L      Comment: Serial Number: [id number]:  [id number]        O2 Saturation, Arterial 99.0 %      CO2 Content 19.4 mmol/L      Barometric Pressure for Blood Gas --     Comment: N/A        Modality Cannula     FIO2 40 %      Hemodilution No    POCT Electrolytes +HGB +HCT [[id number]]  (Abnormal) Collected: 07/31/21 2124    Specimen: Blood Updated: 07/31/21 2132     Sodium 144 mmol/L      POC Potassium 4.1 mmol/L      Ionized Calcium 0.83 mmol/L      Comment: Serial Number: [id number]:  [id number]        Glucose 110 mg/dL      Hematocrit 43 %      Hemoglobin 14.7 g/dL     POC Lactate [[id number]]  (Abnormal)  Collected: 07/31/21 2124    Specimen: Blood Updated: 07/31/21 2131     Lactate 6.5 mmol/L      Comment: Serial Number: [id number]:  [id number]       POC Glucose Once [[id number]]  (Abnormal) Collected: 07/31/21 2124    Specimen: Blood Updated: 07/31/21 2128     Glucose 110 mg/dL      Comment: Serial Number: [id number]:  [id number]       CBC & Differential [[id number]]  (Normal) Collected: 07/31/21 2130    Specimen: Blood from Arm, Left Updated: 07/31/21 2135    Narrative:      The following orders were created for panel order CBC & Differential.  Procedure                               Abnormality         Status                     ---------                               -----------         ------                     CBC Auto Differential[[id number]]        Normal              Final result                 Please view results for these tests on the individual orders.    Comprehensive Metabolic Panel [[id number]]  (Abnormal) Collected: 07/31/21 2130    Specimen: Blood Updated: 07/31/21 2157     Glucose 99 mg/dL      BUN 12 mg/dL      Creatinine 1.23 mg/dL      Sodium 143 mmol/L      Potassium 4.2 mmol/L      Comment: Slight hemolysis detected by analyzer. Results may be affected.        Chloride 106 mmol/L      CO2 20.0 mmol/L      Calcium 9.5 mg/dL      Total Protein 7.0 g/dL      Albumin 4.70 g/dL      ALT (SGPT) 24 U/L      AST (SGOT) 23 U/L      Alkaline Phosphatase 53 U/L      Total Bilirubin 0.3 mg/dL      eGFR Non African Amer 66 mL/min/1.73      Globulin 2.3 gm/dL      A/G Ratio 2.0 g/dL      BUN/Creatinine Ratio 9.8     Anion Gap 17.0 mmol/L     Narrative:      GFR Normal >60  Chronic Kidney Disease <60  Kidney Failure <15      Protime-INR [[id number]]  (Normal) Collected: 07/31/21 2130    Specimen: Blood Updated: 07/31/21 2153     Protime 11.2 Seconds      INR 1.01    aPTT [[id number]]  (Abnormal) Collected: 07/31/21 2130    Specimen: Blood Updated: 07/31/21 2153     PTT 20.6 seconds     Lipase [[id number]]  (Normal)  "Collected: 07/31/21 2130    Specimen: Blood Updated: 07/31/21 2157     Lipase 29 U/L     Troponin [[id number]]  (Normal) Collected: 07/31/21 2130    Specimen: Blood Updated: 07/31/21 2157     Troponin T <0.010 ng/mL     Narrative:      Troponin T Reference Range:  <= 0.03 ng/mL-   Negative for AMI  >0.03 ng/mL-     Abnormal for myocardial necrosis.  Clinicians would have to utilize clinical acumen, EKG, Troponin and serial changes to determine if it is an Acute Myocardial Infarction or myocardial injury due to an underlying chronic condition.       Results may be falsely decreased if patient taking Biotin.      Procalcitonin [[id number]]  (Normal) Collected: 07/31/21 2130    Specimen: Blood Updated: 07/31/21 2201     Procalcitonin 0.03 ng/mL     Narrative:      As a Marker for Sepsis (Non-Neonates):     1. <0.5 ng/mL represents a low risk of severe sepsis and/or septic shock.  2. >2 ng/mL represents a high risk of severe sepsis and/or septic shock.    As a Marker for Lower Respiratory Tract Infections that require antibiotic therapy:  PCT on Admission     Antibiotic Therapy             6-12 Hrs later  >0.5                          Strongly Recommended            >0.25 - <0.5             Recommended  0.1 - 0.25                  Discouraged                       Remeasure/reassess PCT  <0.1                         Strongly Discouraged         Remeasure/reassess PCT      As 28 day mortality risk marker: \"Change in Procalcitonin Result\" (>80% or <=80%) if Day 0 (or Day 1) and Day 4 values are available. Refer to http://www.[employer]-pct-calculator.com/    Change in PCT <=80 %   A decrease of PCT levels below or equal to 80% defines a positive change in PCT test result representing a higher risk for 28-day all-cause mortality of patients diagnosed with severe sepsis or septic shock.    Change in PCT >80 %   A decrease of PCT levels of more than 80% defines a negative change in PCT result representing a lower risk for 28-day " all-cause mortality of patients diagnosed with severe sepsis or septic shock.              Results may be falsely decreased if patient taking Biotin.     Acetaminophen Level [[id number]]  (Normal) Collected: 07/31/21 2130    Specimen: Blood Updated: 07/31/21 2157     Acetaminophen <5.0 mcg/mL     Narrative:      Acetaminophen Therapeutic Range  5-20 ug/mL      Hours after ingestion            Toxic Value    4 Hours                           150 ug/mL    8 Hours                            70 ug/mL   12 Hours                            40 ug/mL   16 Hours                            20 ug/mL    These values apply to a single ingestion only.     Ethanol [[id number]] Collected: 07/31/21 2130    Specimen: Blood Updated: 07/31/21 2157     Ethanol % 0.164 %     Narrative:      Plasma Ethanol Clinical Symptoms:    ETOH (%)               Clinical Symptom  .01-.05              No apparent influence  .03-.12              Euphoria, Diminished judgment and attention   .09-.25              Impaired comprehension, Muscle incoordination  .18-.30              Confusion, Staggered gait, Slurred speech  .25-.40              Markedly decreased response to stimuli, unable to stand or                        walk, vomitting, sleep or stupor  .35-.50              Comatose, Anesthesia, Subnormal body temperature        Salicylate Level [[id number]]  (Normal) Collected: 07/31/21 2130    Specimen: Blood Updated: 07/31/21 2214     Salicylate <0.3 mg/dL     CK [[id number]]  (Normal) Collected: 07/31/21 2130    Specimen: Blood Updated: 07/31/21 2157     Creatine Kinase 109 U/L     Magnesium [[id number]]  (Normal) Collected: 07/31/21 2130    Specimen: Blood Updated: 07/31/21 2157     Magnesium 2.2 mg/dL     TSH [[id number]]  (Normal) Collected: 07/31/21 2130    Specimen: Blood Updated: 07/31/21 2201     TSH 1.660 uIU/mL     CBC Auto Differential [[id number]]  (Normal) Collected: 07/31/21 2130    Specimen: Blood from Arm, Left Updated: 07/31/21 2135      WBC 8.00 10*3/mm3      RBC 4.96 10*6/mm3      Hemoglobin 16.2 g/dL      Hematocrit 47.9 %      MCV 96.6 fL      MCH 32.6 pg      MCHC 33.7 g/dL      RDW 12.9 %      RDW-SD 43.3 fl      MPV 8.2 fL      Platelets 314 10*3/mm3      Neutrophil % 56.5 %      Lymphocyte % 30.8 %      Monocyte % 10.7 %      Eosinophil % 1.4 %      Basophil % 0.6 %      Neutrophils, Absolute 4.50 10*3/mm3      Lymphocytes, Absolute 2.50 10*3/mm3      Monocytes, Absolute 0.90 10*3/mm3      Eosinophils, Absolute 0.10 10*3/mm3      Basophils, Absolute 0.10 10*3/mm3      nRBC 0.1 /100 WBC     Urinalysis With Culture If Indicated - Urine, Catheter [[id number]]  (Normal) Collected: 07/31/21 2132    Specimen: Urine, Catheter Updated: 07/31/21 2139     Color, UA Yellow     Appearance, UA Clear     pH, UA 6.0     Specific Gravity, UA <=1.005     Glucose, UA Negative     Ketones, UA Negative     Bilirubin, UA Negative     Blood, UA Negative     Protein, UA Negative     Leuk Esterase, UA Negative     Nitrite, UA Negative     Urobilinogen, UA 0.2 E.U./dL    Narrative:      Urine microscopic not indicated.    Urine Drug Screen - Urine, Clean Catch [[id number]]  (Normal) Collected: 07/31/21 2134    Specimen: Urine, Clean Catch Updated: 07/31/21 2259     Amphet/Methamphet, Screen Negative     Barbiturates Screen, Urine Negative     Benzodiazepine Screen, Urine Negative     Cocaine Screen, Urine Negative     Opiate Screen Negative     THC, Screen, Urine Negative     Methadone Screen, Urine Negative     Oxycodone Screen, Urine Negative    Narrative:      Negative Thresholds Per Drugs Screened:    Amphetamines                 500 ng/ml  Barbiturates                 200 ng/ml  Benzodiazepines              100 ng/ml  Cocaine                      300 ng/ml  Methadone                    300 ng/ml  Opiates                      300 ng/ml  Oxycodone                    100 ng/ml  THC                           50 ng/ml    The Normal Value for all drugs tested is  negative. This report includes final unconfirmed screening results to be used for medical treatment purposes only. Unconfirmed results must not be used for non-medical purposes such as employment or legal testing. Clinical consideration should be applied to any drug of abuse test, particularly when unconfirmed results are used.          All urine drugs of abuse requests without chain of custody are for medical screening purposes only.  False positives are possible.      Blood Culture - Blood, Arm, Left [[id number]] Collected: 07/31/21 2227    Specimen: Blood from Arm, Left Updated: 07/31/21 2234    Blood Culture - Blood, Arm, Right [[id number]] Collected: 07/31/21 2232    Specimen: Blood from Arm, Right Updated: 07/31/21 2234            MICRO:  Microbiology Results (last 10 days)     ** No results found for the last 240 hours. **            RADIOLOGY STUDIES:  Imaging Results (Last 72 Hours)     Procedure Component Value Units Date/Time    CT Chest Pulmonary Embolism [[id number]] Collected: 07/31/21 2247     Updated: 07/31/21 2251    Narrative:      Exam: CTA thorax, PE protocol    Date: July 31, 2021    History: Unresponsive, cardiac arrest, respiratory failure    Comparison: None available    Technique: Contiguous axial CT images obtained of the thorax following the uneventful intravenous administration of 100 mL Isovue-370 contrast. Additionally, sagittal, coronal and 3-D reformatted images were obtained. CT dose lowering techniques were   used, to include: automated exposure control, adjustment for patient size, and or use of iterative reconstruction.    Findings:    Thoracic aorta: There is limited characterization of the ascending aorta secondary to motion artifact. There is no aneurysm.    HEART: Normal.    Pulmonary arteries: The pulmonary arteries are reasonably well visualized. There is no filling defect to suggest an acute pulmonary embolus.    Mediastinum: There is a well-positioned endotracheal tube. There  is no pathologic mediastinal adenopathy.    Lung parenchyma: There is compressive bibasilar atelectasis. There is no pneumothorax or pleural fluid collection.    Upper abdomen: For evaluation of the abdomen, please see the full dictated report of the CT study of the abdomen and pelvis.    Osseous structures: No acute fractures are identified.      Impression:      1. No pulmonary embolus.  2. No acute abnormality.    Slot 63    Electronically signed by:  Leodan Topete M.D.    7/31/2021 8:50 PM    CT Head Without Contrast [[id number]] Collected: 07/31/21 2245     Updated: 07/31/21 2249    Narrative:      EXAMINATION: CT HEAD WO CONTRAST    DATE: 7/31/2021 10:10 PM     INDICATION: Unresponsive. Cardiac arrest.     COMPARISON: None available.     TECHNIQUE: Thin section noncontrast axial images were obtained through the head. Coronal reformatted images were created.  CT dose lowering techniques were used, to include: automated exposure control, adjustment for patient size, and or use of iterative   reconstruction    FINDINGS:    No acute intracranial hemorrhage. No acute territorial infarct. Gray-white differentiation is maintained.  No acute territorial infarct. No intracranial mass or mass effect.     No hydrocephalus. Basal cisterns are patent.       Globes and orbits are unremarkable.  Mild-to-moderate mucosal thickening in the right paranasal sinuses. Mastoid air cells are clear.  Calvarium is intact.        Impression:        1.  No acute intracranial abnormality. No findings of diffuse anoxic brain injury. If there is further concern, suggest repeat exam in 24 hours.  2.  Paranasal sinus mucosal thickening.          Electronically signed by:  Neo Carrillo DO    7/31/2021 8:48 PM    XR Chest 1 View [[id number]] Collected: 07/31/21 2236     Updated: 07/31/21 2241    Narrative:      FRONTAL VIEW OF THE CHEST    CLINICAL INDICATION: Altered mental status.    COMPARISON: 7/31/2021.    FINDINGS: No focal consolidation,  pleural effusion or pneumothorax. Cardiomediastinal morphology is normal. Osseous structures are unremarkable.      Impression:      No acute cardiopulmonary abnormality.    Electronically signed by:  Ryland Massey M.D.    7/31/2021 8:40 PM    CT Abdomen Pelvis With Contrast [[id number]] Collected: 07/31/21 2235     Updated: 07/31/21 2240    Narrative:      Exam: CT abdomen and pelvis with contrast    Date: July 31, 2021    History: Unresponsive, abdominal pain    Comparison: August 22, 2019    Technique: Contiguous axial CT images were obtained of the abdomen and pelvis following the uneventful intravenous administration of 100 mL Isovue-370 contrast. Additionally, sagittal and coronal reformatted images were obtained.  CT dose lowering   techniques were used, to include: automated exposure control, adjustment for patient size, and or use of iterative reconstruction.    Findings:    Lung bases: There is compressive atelectasis in the lung bases. The heart is not enlarged.    Liver: The liver is mildly hypodense.    Spleen: Normal.    Pancreas: Normal.    Venograms: Normal.    Gallbladder: The gallbladder is surgically absent.    Kidneys: There are subtle areas of cortical hypoenhancement involving both kidneys. There are numerous nonobstructing bilateral renal calculi. There is no hydronephrosis or obstructive uropathy.    Abdominal mesentery: There is no pathologic abdominal mass or adenopathy.    Bowel loops: There is a moderate to large amount of retained colonic fecal debris. The appendix is surgically absent. The stomach is distended with air, fluid and debris. There is no small bowel obstruction.    CT PELVIS:  There is a Osorio catheter within the urinary bladder.    Abdominal aorta: There is no aneurysm or dissection.    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 There is no evidence of osseous collapse. No acute fractures are identified.       Impression:      1. The stomach is distended with air, fluid and debris. Etiology is uncertain.  2. Subtle areas of cortical hypoenhancement involving both kidneys. This could be related to hypotension, acute tubular necrosis or pyelonephritis.  3. 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  4. Numerous nonobstructing bilateral renal calculi.  5. Constipation.      Slot 63    Electronically signed by:  Leodan Topete M.D.    7/31/2021 8:39 PM    XR Chest 1 View [[id number]] Collected: 07/31/21 2234     Updated: 07/31/21 2236    Narrative:      Exam:  Single view chest    Date: July 31, 2021    History: Respiratory failure, unresponsive    Comparison: Same day    Technique: Single frontal radiograph of the chest was obtained    Findings:    There is a well-positioned endotracheal tube. The heart is not enlarged. Mediastinum and pulmonary vasculature are unremarkable. There is no pneumothorax or sizable pleural fluid collection.      Impression:      Impression:  1. Well-positioned endotracheal tube.    Slot 63      Electronically signed by:  Leodan Topete M.D.    7/31/2021 8:35 PM        I  reviewed the patient's new clinical results.    I discussed the patient's findings and my recommendations with patient, family and nursing staff.  I have discussed plan with on-call attending physician, who agrees with this plan of care.    Appropriate PPE worn during assessment of patient per established guidelines.      Electronically signed by DOLORES Sullivan, 07/31/21, 11:30 PM EDT.       Electronically signed by Priscilla Newman MD at 08/01/21 2336          Emergency Department Notes      Marily Callahan RN at 07/31/21 2200        EMS reports that they received the call for cardiac arrest. On arrival they said patient had pulse. Family at scene states patient had allergic reaction( alpha gal) and gave self epi pen. Later spouse came home and gave patient a 2nd epi pen. At  that point she called EMS because she states patient had seizure like activity and then lost pulse. Family reports no drug use. Patient noted unresponsive but vomiting. Patient vitals stable on arrival.      Marily Callahan RN  08/01/21 0629      Electronically signed by Marily Callahan RN at 08/01/21 0629     Nestor Hawk MD at 07/31/21 3355      Procedure Orders    1. Intubation [[id number]] ordered by Nestor Hawk MD               Subjective   Chief complaint history of present illness is all provided by family as patient is not verbally responsive    Chief complaint possible allergic reaction alpha gal trouble breathing    History of present illness this is a 40-year-old male with history of alpha gal who reportedly ate some chicken salad today and then started to feel ill he took some Benadryl but continued to still have some problems he gave himself an EpiPen ultimately his wife states that he collapsed she gave him an additional injection epi and she started CPR because she cannot again respond unclear if he had a pulse or not EMS arrived shortly afterwards and found to have a pulse and he was moving around but not following commands and vomiting.  Patient was given additional Benadryl IV in route and patient was given Zofran in route.  He remained hemodynamically stable moving everything and vomiting but would not speak.  No other history is available family reports no trauma he said his Covid vaccinations and has been no injury.  He states that he has had some previous problems with this alpha gal he recently had been seen at the urgent care and he just finished some steroids yesterday.  No reported illness recently patient did provide any other history at this time.          Review of Systems   Unable to perform ROS: Mental status change       Past Medical History:   Diagnosis Date   • Acne varioliformis    • Acute reaction to stress    • Allergic rhinitis    • Allergy to alpha-gal    • Anxiety     • Anxiety    • Costochondritis 03/04/2013   • Depression    • Gastroesophageal reflux disease with esophagitis    • History of kidney stones    • Migraine with aura    • PUD (peptic ulcer disease)        Allergies   Allergen Reactions   • Bee Venom Anaphylaxis   • Beef-Derived Products Anaphylaxis     Alpha-gal   • Latex Anaphylaxis   • Other Anaphylaxis     Has Alpha gal so any mammal products   • Pork-Derived Products Anaphylaxis     Alpha-gal         Past Surgical History:   Procedure Laterality Date   • APPENDECTOMY     • CHOLECYSTECTOMY     • HYDROCELE EXCISION / REPAIR Left        Family History   Problem Relation Age of Onset   • Diabetes Father         passed away due to complications   • Coronary artery disease Father    • Mental illness Neg Hx    • Alcohol abuse Neg Hx    • Drug abuse Neg Hx        Social History     Socioeconomic History   • Marital status:      Spouse name: Not on file   • Number of children: Not on file   • Years of education: Not on file   • Highest education level: Not on file   Tobacco Use   • Smoking status: Never Smoker   • Smokeless tobacco: Never Used   Substance and Sexual Activity   • Alcohol use: Yes     Alcohol/week: 3.0 standard drinks     Types: 3 Cans of beer per week   • Drug use: Defer   • Sexual activity: Defer     Prior to Admission medications    Medication Sig Start Date End Date Taking? Authorizing Provider   albuterol sulfate  (90 Base) MCG/ACT inhaler Inhale 2 puffs Every 4 (Four) Hours As Needed for Wheezing. 7/23/21   Gustavo Escobedo Sr., MD   cetirizine (zyrTEC) 10 MG tablet Take 10 mg by mouth Daily.    Emergency, Nurse FRANSISCO Denson   doxycycline (MONODOX) 100 MG capsule Take 1 capsule by mouth 2 (Two) Times a Day for 14 days. 7/23/21 8/6/21  Gustavo Escobedo Sr., MD   EPINEPHrine (EPIPEN) 0.3 MG/0.3ML solution auto-injector injection Inject 0.3 mg into the appropriate muscle as directed by prescriber. 10/6/17   Emergency, Nurse Janiya RN   famotidine  (PEPCID) 10 MG tablet Take 10 mg by mouth 2 (Two) Times a Day.    Emergency, Nurse FRANSISCO Denson   lamoTRIgine (LaMICtal) 100 MG tablet Take 100 mg by mouth Daily.    Emergency, Nurse Janiya RN   LORazepam (ATIVAN) 1 MG tablet TK 1 T PO BID PRN 7/29/19   Emergency, Nurse FRANSISCO Denson   multivitamin (ONE-A-DAY MENS PO) Take  by mouth Daily.    Emergency, Nurse FRANSISCO Denson   predniSONE (DELTASONE) 10 MG tablet Start with 4 tablets tomorrow and wean by 1 tablet a day over 4 days 7/21/21   Simone Moulton MD   QUEtiapine XR (SEROQUEL XR) 400 MG 24 hr tablet Take  by mouth Daily.    Emergency, Nurse FRANSISCO Denson   venlafaxine XR (EFFEXOR XR) 150 MG 24 hr capsule Take  by mouth.    Emergency, Nurse Epic, RN           Objective   Physical Exam  40-year-old male with EMS bagging him but he is moving everything and vomiting.  Patient has this all over him.  Nonbloody.  HEENT no obvious trauma pupils equal round reactive and all extraocular muscles are intact the mouth is otherwise clear he is a nasal trumpet in the right nares.  Neck no JVD no bruits supple no meningeal signs  Lungs good breath sounds bilaterally sats are 100%.  Heart regular without murmur  Abdomen soft without tenderness or masses no obvious trauma  Extremities no edema good and equal pulses upper and lower extremities there is no palp cords or Homans' sign no evidence of DVT.  Neurologic patient is awake but he will not speak at this time he is moving everything he does not follow bands.  Does have some moaning speech Luttrell Coma Scale 10  Intubation    Date/Time: 7/31/2021 11:23 PM  Performed by: Nestor Hawk MD  Authorized by: Nestor Hawk MD     Consent:     Consent obtained:  Emergent situation  Pre-procedure details:     Patient status:  Altered mental status    Pretreatment meds: Propofol.    Paralytics:  Succinylcholine  Procedure details:     Preoxygenation:  Nasal cannula    CPR in progress: no      Intubation method:  Oral    Tube size (mm):  7.5     Tube type:  Double lumen    Number of attempts:  1    Cricoid pressure: yes    Placement assessment:     ETT to lip:  23 cm    Tube secured with:  ETT lechuga    Breath sounds:  Absent over the epigastrium and equal    Placement verification: chest rise, CXR verification, direct visualization, equal breath sounds and ETCO2 detector      CXR findings:  ETT in proper place  Post-procedure details:     Patient tolerance of procedure:  Tolerated well, no immediate complications              ED Course      Results for orders placed or performed during the hospital encounter of 07/31/21   Comprehensive Metabolic Panel    Specimen: Blood   Result Value Ref Range    Glucose 99 65 - 99 mg/dL    BUN 12 6 - 20 mg/dL    Creatinine 1.23 0.76 - 1.27 mg/dL    Sodium 143 136 - 145 mmol/L    Potassium 4.2 3.5 - 5.2 mmol/L    Chloride 106 98 - 107 mmol/L    CO2 20.0 (L) 22.0 - 29.0 mmol/L    Calcium 9.5 8.6 - 10.5 mg/dL    Total Protein 7.0 6.0 - 8.5 g/dL    Albumin 4.70 3.50 - 5.20 g/dL    ALT (SGPT) 24 1 - 41 U/L    AST (SGOT) 23 1 - 40 U/L    Alkaline Phosphatase 53 39 - 117 U/L    Total Bilirubin 0.3 0.0 - 1.2 mg/dL    eGFR Non African Amer 66 >60 mL/min/1.73    Globulin 2.3 gm/dL    A/G Ratio 2.0 g/dL    BUN/Creatinine Ratio 9.8 7.0 - 25.0    Anion Gap 17.0 (H) 5.0 - 15.0 mmol/L   Protime-INR    Specimen: Blood   Result Value Ref Range    Protime 11.2 9.6 - 11.7 Seconds    INR 1.01 0.93 - 1.10   aPTT    Specimen: Blood   Result Value Ref Range    PTT 20.6 (L) 24.0 - 31.0 seconds   Lipase    Specimen: Blood   Result Value Ref Range    Lipase 29 13 - 60 U/L   Urinalysis With Culture If Indicated - Urine, Catheter    Specimen: Urine, Catheter   Result Value Ref Range    Color, UA Yellow Yellow, Straw    Appearance, UA Clear Clear    pH, UA 6.0 5.0 - 8.0    Specific Gravity, UA <=1.005 1.005 - 1.030    Glucose, UA Negative Negative    Ketones, UA Negative Negative    Bilirubin, UA Negative Negative    Blood, UA Negative Negative     Protein, UA Negative Negative    Leuk Esterase, UA Negative Negative    Nitrite, UA Negative Negative    Urobilinogen, UA 0.2 E.U./dL 0.2 - 1.0 E.U./dL   Troponin    Specimen: Blood   Result Value Ref Range    Troponin T <0.010 0.000 - 0.030 ng/mL   Procalcitonin    Specimen: Blood   Result Value Ref Range    Procalcitonin 0.03 0.00 - 0.25 ng/mL   Acetaminophen Level    Specimen: Blood   Result Value Ref Range    Acetaminophen <5.0 0.0 - 30.0 mcg/mL   Ethanol    Specimen: Blood   Result Value Ref Range    Ethanol % 0.164 %   Urine Drug Screen - Urine, Clean Catch    Specimen: Urine, Clean Catch   Result Value Ref Range    Amphet/Methamphet, Screen Negative Negative    Barbiturates Screen, Urine Negative Negative    Benzodiazepine Screen, Urine Negative Negative    Cocaine Screen, Urine Negative Negative    Opiate Screen Negative Negative    THC, Screen, Urine Negative Negative    Methadone Screen, Urine Negative Negative    Oxycodone Screen, Urine Negative Negative   Salicylate Level    Specimen: Blood   Result Value Ref Range    Salicylate <0.3 <=30.0 mg/dL   CK    Specimen: Blood   Result Value Ref Range    Creatine Kinase 109 20 - 200 U/L   Magnesium    Specimen: Blood   Result Value Ref Range    Magnesium 2.2 1.6 - 2.6 mg/dL   TSH    Specimen: Blood   Result Value Ref Range    TSH 1.660 0.270 - 4.200 uIU/mL   CBC Auto Differential    Specimen: Arm, Left; Blood   Result Value Ref Range    WBC 8.00 3.40 - 10.80 10*3/mm3    RBC 4.96 4.14 - 5.80 10*6/mm3    Hemoglobin 16.2 13.0 - 17.7 g/dL    Hematocrit 47.9 37.5 - 51.0 %    MCV 96.6 79.0 - 97.0 fL    MCH 32.6 26.6 - 33.0 pg    MCHC 33.7 31.5 - 35.7 g/dL    RDW 12.9 12.3 - 15.4 %    RDW-SD 43.3 37.0 - 54.0 fl    MPV 8.2 6.0 - 12.0 fL    Platelets 314 140 - 450 10*3/mm3    Neutrophil % 56.5 42.7 - 76.0 %    Lymphocyte % 30.8 19.6 - 45.3 %    Monocyte % 10.7 5.0 - 12.0 %    Eosinophil % 1.4 0.3 - 6.2 %    Basophil % 0.6 0.0 - 1.5 %    Neutrophils, Absolute 4.50 1.70 -  7.00 10*3/mm3    Lymphocytes, Absolute 2.50 0.70 - 3.10 10*3/mm3    Monocytes, Absolute 0.90 0.10 - 0.90 10*3/mm3    Eosinophils, Absolute 0.10 0.00 - 0.40 10*3/mm3    Basophils, Absolute 0.10 0.00 - 0.20 10*3/mm3    nRBC 0.1 0.0 - 0.2 /100 WBC   Blood Gas, Arterial -    Specimen: Arterial Blood   Result Value Ref Range    Site Right Radial     Rahat's Test Positive     pH, Arterial 7.525 (H) 7.350 - 7.450 pH units    pCO2, Arterial 22.6 (L) 35.0 - 48.0 mm Hg    pO2, Arterial 114.6 (H) 83.0 - 108.0 mm Hg    HCO3, Arterial 18.7 (L) 21.0 - 28.0 mmol/L    Base Excess, Arterial -2.0 (L) 0.0 - 3.0 mmol/L    O2 Saturation, Arterial 99.0 (H) 94.0 - 98.0 %    CO2 Content 19.4 (L) 22 - 29 mmol/L    Barometric Pressure for Blood Gas      Modality Cannula     FIO2 40 %    Hemodilution No    POCT Electrolytes +HGB +HCT    Specimen: Blood   Result Value Ref Range    Sodium 144 138 - 146 mmol/L    POC Potassium 4.1 3.5 - 4.5 mmol/L    Ionized Calcium 0.83 (C) 1.15 - 1.33 mmol/L    Glucose 110 (H) 74 - 100 mg/dL    Hematocrit 43 38 - 51 %    Hemoglobin 14.7 12.0 - 17.0 g/dL   POC Lactate    Specimen: Blood   Result Value Ref Range    Lactate 6.5 (C) 0.5 - 2.0 mmol/L   POC Glucose Once    Specimen: Blood   Result Value Ref Range    Glucose 110 (H) 74 - 100 mg/dL     XR Chest 1 View    Result Date: 7/31/2021  No acute cardiopulmonary abnormality. Electronically signed by:  Ryland Massey M.D.  7/31/2021 8:40 PM    Medications   sodium chloride 0.9 % flush 10 mL (has no administration in time range)   propofol (DIPRIVAN) infusion 10 mg/mL 100 mL (25 mcg/kg/min × 75.7 kg Intravenous Rate/Dose Change 7/31/21 9343)   fentaNYL 1000 mcg in 100 mL NS infusion (has no administration in time range)   nitroglycerin (NITROSTAT) SL tablet 0.4 mg (has no administration in time range)   sodium chloride 0.9 % flush 10 mL (has no administration in time range)   sodium chloride 0.9 % flush 10 mL (has no administration in time range)   acetaminophen  (TYLENOL) tablet 650 mg (has no administration in time range)     Or   acetaminophen (TYLENOL) suppository 650 mg (has no administration in time range)   ondansetron (ZOFRAN) tablet 4 mg ( Oral Not Given:  See Alt 7/31/21 2336)     Or   ondansetron (ZOFRAN) injection 4 mg (4 mg Intravenous Given 7/31/21 2336)   chlorhexidine (PERIDEX) 0.12 % solution 15 mL (has no administration in time range)   famotidine (PEPCID) injection 20 mg (has no administration in time range)   LORazepam (ATIVAN) injection 1 mg (1 mg Intravenous Given 7/31/21 2125)   sodium chloride 0.9 % bolus 1,000 mL (0 mL Intravenous Stopped 7/31/21 2235)   methylPREDNISolone sodium succinate (SOLU-Medrol) injection 125 mg (125 mg Intravenous Given 7/31/21 2318)   sodium chloride 0.9 % bolus 2,271 mL (0 mL/kg × 75.7 kg Intravenous Stopped 7/31/21 2327)   succinylcholine (ANECTINE) injection 100 mg (100 mg Intravenous Given 7/31/21 2155)   sterile water (preservative free) injection 10 mL (10 mL Injection Given 7/31/21 2205)   vecuronium (NORCURON) injection 10 mg (10 mg Intravenous Given 7/31/21 2205)   cefepime 2 gm IVPB in 100 ml NS (MBP) (2 g Intravenous New Bag 7/31/21 2318)   iopamidol (ISOVUE-370) 76 % injection 100 mL (100 mL Intravenous Given 7/31/21 2224)   famotidine (PEPCID) injection 20 mg (20 mg Intravenous Given 7/31/21 2337)          EKG my interpretation normal sinus rhythm rate 100 normal axis hypertrophy QTC of 439 normal EKG                                  MDM  Number of Diagnoses or Management Options  Acute respiratory failure, unspecified whether with hypoxia or hypercapnia (CMS/HCC): new and requires workup  Allergic reaction to alpha-gal: established and worsening  Diagnosis management comments: Medical decision making.  Patient IV established placed on a cardiac monitor.  He started IV normal saline 30 mill per kilo's.  Patient's vital signs and blood pressure remained stable at this point.  Labs are sent blood gas obtained.   EKG was a sinus rhythm without acute findings.  Case 7.5 PCO2 22 PO2 of 114 on 2 L of oxygen.  The patient lactate was 6.5 cultures obtained white count was normal Tylenol aspirin negative drug screen negative.  The patient had alcohol 0.164 Tylenol and aspirin negative urine chemistries unremarkable chest x-ray #1 unremarkable chest x-ray #2 ET tube in good position.  Scanned CT head without without acute findings per radiology reading CT scan of the chest PE protocol was negative per radiology acute readings.  CT abdomen pelvis had some bilateral avascular necrosis of the femoral heads but otherwise no acute findings.  Patient remained stable he was starting to wake up he had been sedated with propofol.  He is initially been paralyzed with Norcuron to go get CT scans.  On repeat examination at 11:10 PM he started to move around his eyes were open there was no focal findings he was hemodynamically stable.  Patient had been given Solu-Medrol as well and Pepcid 20 mg IV.  See no evidence of acute myocardial infarction acute stroke DVT pulmonary embolism or dissection lactate was elevated but I find no other source of infection he has had no pneumonia his urine is clean.  There is no obvious cellulitis swelling at this point.  But he had been given a dose of cefepime.  He has had no hypotension.  Sats are 100%.  Made aware of the findings.  He will be admitted to ICU to ICU intensivist nurse practitioner notified.  Patient currently stable.  Critical care 30 minutes labs EKG reviewed by me CT reports reviewed by me.  Patient has no other hives or edema there was no airway edema on inspection when intubated.  He had also given Pepcid 20 mg IV       Amount and/or Complexity of Data Reviewed  Clinical lab tests: reviewed  Tests in the radiology section of CPT®: reviewed  Discuss the patient with other providers: yes  Independent visualization of images, tracings, or specimens: yes    Risk of Complications, Morbidity,  and/or Mortality  Presenting problems: high  Diagnostic procedures: high    Critical Care  Total time providing critical care: 30-74 minutes    Patient Progress  Patient progress: stable      Final diagnoses:   Acute respiratory failure, unspecified whether with hypoxia or hypercapnia (CMS/HCC)   Allergic reaction to alpha-gal       ED Disposition  ED Disposition     ED Disposition Condition Comment    Decision to Admit  Level of Care: Critical Care [6]   Admitting Physician: PANKAJ ALAN [[id number]]   Attending Physician: PANKAJ ALAN [[id number]]            No follow-up provider specified.       Medication List      ASK your doctor about these medications    venlafaxine 225 MG tablet sustained-release 24 hour 24 hr tablet  Ask about: Which instructions should I use?             Nestor Hawk MD  21      Electronically signed by Nestor Hawk MD at 21          Physician Progress Notes (all)      Samson Roman MD at 21          PULMONARY/CRITICAL CARE PROGRESS NOTE       NAME:     Warren Giraldo   AGE:     40 y.o.   SEX:  male   : 1980       MRN:       [id number]          RM: [facility] ICU /     ASSESSMENT & PLAN     F/U: Acute respiratory failure    Active Problems:    Anxiety    Depressive disorder    Gastroesophageal reflux disease with esophagitis    PUD (peptic ulcer disease)    Acute respiratory failure requiring reintubation (CMS/HCC)    Mixed anxiety depressive disorder    Anaphylaxis due to food    Respiratory arrest (CMS/HCC)    Aspiration pneumonia (CMS/HCC)    Sepsis without septic shock (CMS/HCC)     Multidisciplinary Rounds:  -MOF, hospitalist consulted  -WA protocol  -Psych consult  -Room Air    Assessment and plan  Acute respiratory failure requiring intubation, ?2/2 anaphylaxis from food allergy  Witnessed respiratory arrest at home  Possible aspiration pneumonia  ?Cardiac arrest  -Events at home noted, unclear if patient had cardiac arrest  -Patient  intubated for airway protection  -Planned extubation 8/1 however failed after 6 hours and required reintubation  -Self extubated 8/2  -Wean support to keep O2 saturation > 91%  -Continue Benadryl and Pepcid  -Continue systemic steroids  -Takes Xoponex, can take PRN, discontinued RT meds  -Room Air     Rule out sepsis without shock, secondary to possible aspiration pneumonia, Ruled Out  -Witnessed emesis prior to arrival and in ED  -IVF bolus 30 mL/kg per sepsis protocol given in ED  -Continue IVFs  -Lactic Acid 2.2, monitor and trend labs until normalized.  -Blood cultures-pending  -UA-negative  -Procalcitonin-not elevated, CRP-not elevated  -Received cefepime empirically.  Will discontinue and monitor off antibiotics     Gastroesophageal reflux disease with esophagitis  Peptic ulcer disease  -Continue home sucralfate when clinically appropriate  -Continue home Pepcid when clinically appropriate     Numerous nonobstructing bilateral renal calculi  -Osorio catheter placed in ED  -Continue fluids  -Renal ultrasound reviewed, nonobstructing stone in the right kidney  -Consider nephrology consult if needed     Anxiety  Depressive disorder  -Continue home Seroquel when clinically appropriate  -Continue home Lamictal when clinically appropriate  -Continue home venlafaxine when clinically appropriate  -Psych consulted    Code Status: FULL  VTE Prophylaxis: SCDs, Arixtra  PUD Prophylaxis: Pepcid      [place] & SUBJECTIVE     Patient presented to [facility] on 7/31/2021 via EMS after collapse at home.  Patient has severe allergies, and wife was at home when patient collapsed.  Per wife patient was not breathing and she was unsure if she felt a pulse.  She gave patient epi and attempted to deliver breaths, however did not feel like it was working.  She began chest compressions while on the phone with EMS.  Patient has beef and pork allergy, and had chicken salad today with which wife believes there may have been some  cross-contamination.  Wife states that this is happened before, with most recent allergic reaction happening roughly 2 weeks ago however not this severe.  When EMS arrived patient was found to have a pulse and was moving around although not following commands with vomiting.  Patient was given additional Benadryl IV and Zofran in route.  Wife states that EMS attempted to intubate patient however they were unsuccessful.   Patient was given sepsis bolus in ED and was intubated in ED for airway protection.     In the ED, labs were obtained with abnormalities as follows: Glucose 110, ionized calcium 0.83, PTT 20.6.  ABG: pH 7.525, PCO2 22.6, PO2 114.6, HCO3 18.7.  UA negative for UTI.  CXR was negative for acute cardiopulmonary normalities.  CT head without contrast was negative for acute intracranial abnormality.  CT abdomen pelvis showed stomach distention with air.  Double areas of cortical hypoenhancement involving both kidneys, which could be related to hypotension, acute tubular necrosis, or polynephritis.  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    8/1: intubated, awake and agitated  8/2: Self extubated this morning.  No shortness of air.  Patient remains anxious with all sedation off.  8/3: Room air, denies chest pain, shortness of breath, abdominal pain.    REVIEW OF SYSTEMS:  Pertinent items are noted in HPI, all other systems reviewed and negative      MEDICATIONS     SCHEDULED MEDICATIONS:   budesonide, 0.5 mg, Nebulization, BID - RT  cetirizine, 10 mg, Oral, Daily  diphenhydrAMINE, 25 mg, Intravenous, Q6H  doxycycline, 100 mg, Oral, Q12H  famotidine, 40 mg, Oral, Daily  fondaparinux, 2.5 mg, Subcutaneous, Q24H  insulin lispro, 0-7 Units, Subcutaneous, Q6H  ipratropium-albuterol, 3 mL, Nebulization, Q6H - RT  methylPREDNISolone sodium succinate, 60 mg, Intravenous, Q6H  multivitamin, 1 tablet, Oral,  "Daily  QUEtiapine, 100 mg, Oral, Daily  QUEtiapine, 400 mg, Oral, Nightly  risperiDONE, 0.25 mg, Oral, Q12H  sodium chloride, 10 mL, Intravenous, Q12H  venlafaxine XR, 225 mg, Oral, Daily With Breakfast        CONTINUOUS INFUSIONS:   dexmedetomidine, 0.2-1.5 mcg/kg/hr, Last Rate: 0.5 mcg/kg/hr (08/03/21 0015)  sodium chloride, 50 mL/hr, Last Rate: 50 mL/hr (08/01/21 1701)        PRN MEDS:  •  acetaminophen **OR** acetaminophen  •  albuterol sulfate HFA  •  dextrose  •  dextrose  •  glucagon (human recombinant)  •  insulin lispro **AND** insulin lispro  •  LORazepam  •  nitroglycerin  •  ondansetron **OR** ondansetron  •  [COMPLETED] Insert peripheral IV **AND** sodium chloride  •  sodium chloride  •  vecuronium    OBJECTIVE     VITAL SIGNS:  /77   Pulse 64   Temp 97.7 °F (36.5 °C) (Oral)   Resp 16   Ht 182.9 cm (72\")   Wt 73.3 kg (161 lb 9.6 oz)   SpO2 96%   BMI 21.92 kg/m²     I/O FROM PREVIOUS 3 SHIFTS:  I/O last 3 completed shifts:  In: 2410 [I.V.:1881; Other:262; NG/GT:267]  Out: 2900 [Urine:2900]      I/O PREVIOUS 24 HOURS:   Intake/Output                             08/01/21 0701 - 08/02/21 0700 08/02/21 0701 - 08/03/21 0700 08/03/21 0701 - 08/04/21 0700     1256-8071 8113-7181 Total 6601-1507 4739-4344 Total 7892-5777 5650-6368 Total                    Intake    I.V.  301  1379 1680  502  -- 502  --  -- --    Other  --  222 222  40  -- 40  --  -- --    Flush/ Irrigation Intake (mL) ([REMOVED] NG/OG Tube Nasogastric Right nostril) -- 222 222 40 -- 40 -- -- --    NG/GT  --  267 267  --  -- --  --  -- --    Total Intake 301 6319 2162 542 -- 542 -- -- --       Output    Urine  1100  850 1950  1100  950 2050  1100  -- 1100    Total Output 7520 956 4644 7973 683 9477 1100 -- 1100           NET BALANCE SINCE ADMISSION:  Net IO Since Admission: -1,682 mL [08/03/21 0855]     PHYSICAL EXAM:  General Appearance:  Alert, agitated at times  Head:  Normocephalic, without obvious abnormality, atraumatic. "   Eyes:  PERRL, conjunctiva/corneas clear, EOM not assessed     Neck:  Supple, no JVD, trachea midline  Lungs:   Coarse but clear throughout, no rhonchi or wheezing, respirations unlabored without accessory muscle use  Chest wall:  Symmetrical chest wall movement  Heart:  Sinus tachycardia, S1 and S2 normal, no murmur, rub or gallop  Abdomen:  Soft, non-distended, bowel sounds active all four quadrants.  Nontender.  No rebound or guarding  Extremities:  No clubbing, no cyanosis or edema  Skin:  No rashes or lesions  Neurologic:   Alert and oriented x3.  No lateralizing deficits      RESULTS     LABS:  Lab Results (last 24 hours)     Procedure Component Value Units Date/Time    Lactic Acid, Plasma [[id number]]  (Normal) Collected: 08/03/21 0736    Specimen: Blood Updated: 08/03/21 0829     Lactate 1.2 mmol/L     Comprehensive Metabolic Panel [[id number]]  (Abnormal) Collected: 08/03/21 0736    Specimen: Blood Updated: 08/03/21 0826     Glucose 117 mg/dL      BUN 19 mg/dL      Creatinine 0.96 mg/dL      Sodium 140 mmol/L      Potassium 4.1 mmol/L      Chloride 106 mmol/L      CO2 28.0 mmol/L      Calcium 8.8 mg/dL      Total Protein 5.5 g/dL      Albumin 3.80 g/dL      ALT (SGPT) 18 U/L      AST (SGOT) 16 U/L      Alkaline Phosphatase 44 U/L      Total Bilirubin 0.6 mg/dL      eGFR Non African Amer 88 mL/min/1.73      Globulin 1.7 gm/dL      A/G Ratio 2.2 g/dL      BUN/Creatinine Ratio 19.8     Anion Gap 6.0 mmol/L     Narrative:      GFR Normal >60  Chronic Kidney Disease <60  Kidney Failure <15      Phosphorus [[id number]]  (Normal) Collected: 08/03/21 0736    Specimen: Blood Updated: 08/03/21 0826     Phosphorus 3.4 mg/dL     Magnesium [[id number]]  (Normal) Collected: 08/03/21 0736    Specimen: Blood Updated: 08/03/21 0826     Magnesium 2.2 mg/dL     CBC & Differential [[id number]]  (Abnormal) Collected: 08/03/21 0736    Specimen: Blood Updated: 08/03/21 0759    Narrative:      The following orders were created for  panel order CBC & Differential.  Procedure                               Abnormality         Status                     ---------                               -----------         ------                     CBC Auto Differential[[id number]]        Abnormal            Final result                 Please view results for these tests on the individual orders.    CBC Auto Differential [[id number]]  (Abnormal) Collected: 08/03/21 0736    Specimen: Blood Updated: 08/03/21 0759     WBC 11.40 10*3/mm3      RBC 4.06 10*6/mm3      Hemoglobin 13.1 g/dL      Hematocrit 39.2 %      MCV 96.6 fL      MCH 32.2 pg      MCHC 33.4 g/dL      RDW 12.9 %      RDW-SD 42.9 fl      MPV 8.1 fL      Platelets 217 10*3/mm3      Neutrophil % 84.0 %      Lymphocyte % 7.7 %      Monocyte % 8.2 %      Eosinophil % 0.0 %      Basophil % 0.1 %      Neutrophils, Absolute 9.60 10*3/mm3      Lymphocytes, Absolute 0.90 10*3/mm3      Monocytes, Absolute 0.90 10*3/mm3      Eosinophils, Absolute 0.00 10*3/mm3      Basophils, Absolute 0.00 10*3/mm3      nRBC 0.0 /100 WBC     Lactic Acid, Plasma [[id number]]  (Abnormal) Collected: 08/02/21 2341    Specimen: Blood Updated: 08/03/21 0024     Lactate 3.1 mmol/L     Blood Culture - Blood, Arm, Right [[id number]] Collected: 07/31/21 2232    Specimen: Blood from Arm, Right Updated: 08/02/21 2246     Blood Culture No growth at 2 days    Blood Culture - Blood, Arm, Left [[id number]] Collected: 07/31/21 2227    Specimen: Blood from Arm, Left Updated: 08/02/21 2246     Blood Culture No growth at 2 days    Lactic Acid, Plasma [[id number]]  (Abnormal) Collected: 08/02/21 1904    Specimen: Blood Updated: 08/02/21 1935     Lactate 2.7 mmol/L     POC Glucose Once [[id number]]  (Abnormal) Collected: 08/02/21 1733    Specimen: Blood Updated: 08/02/21 1735     Glucose 113 mg/dL      Comment: Serial Number: [id number]:  [id number]       Lactic Acid, Plasma [[id number]]  (Normal) Collected: 08/02/21 1302    Specimen: Blood  Updated: 21 1339     Lactate 1.9 mmol/L            RADIOLOGY:  No Radiology Exams Resulted Within Past 24 Hours    ECHOCARDIOGRAM:  None previous for comparison.     I reviewed the patient's new clinical results.    This note has been scribed by me for pulmonary attending physician.      Electronically signed by DOLORES Cherry, 21 at 08:55 EDT.     I personally have examined  and interviewed the patient. I have reviewed the history, data, problems, assessment and plan with our NP.  Critical care time in direct medical management (   ) minutes  Electronically signed by Samson Roman MD, D,ABSM, 21, 9:27 PM EDT.         Electronically signed by Samson Roman MD at 217     Samson Roman MD at 21 0916          PULMONARY/CRITICAL CARE PROGRESS NOTE       NAME:     Warren Giraldo   AGE:     40 y.o.   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    Respiratory arrest (CMS/HCC)    Aspiration pneumonia (CMS/[facility])    Sepsis without septic shock (CMS/[facility])       Multidisciplinary Rounds:  -self-extubated  -So far tolerating nasal cannula  -Sedation off    Assessment and plan  Acute respiratory failure requiring intubation, ?2/2 anaphylaxis from food allergy  Witnessed respiratory arrest at home  Possible aspiration pneumonia  ?Cardiac arrest  -Events at home noted, unclear if patient had cardiac arrest  -Patient intubated for airway protection  -Planned extubation  however failed after 6 hours and required reintubation  -Self extubated   -Wean support to keep O2 saturation > 91%  -Continue Benadryl and  Pepcid  -Continue systemic steroids  -Duoneb  -Pulmicort     Rule out sepsis without shock, secondary to possible aspiration pneumonia  -Witnessed emesis prior to arrival and in ED  -IVF bolus 30 mL/kg per sepsis protocol given in ED  -Continue IVFs  -Lactic Acid 2.2, monitor and trend labs until normalized.  -Blood cultures-pending  -UA-negative  -Procalcitonin-not elevated, CRP-not elevated  -Received cefepime empirically.  Will discontinue and monitor off antibiotics     Gastroesophageal reflux disease with esophagitis  Peptic ulcer disease  -Continue home sucralfate when clinically appropriate  -Continue home Pepcid when clinically appropriate     Numerous nonobstructing bilateral renal calculi  -Osorio catheter placed in ED  -Continue fluids  -Renal ultrasound reviewed, nonobstructing stone in the right kidney  -Consider nephrology consult if needed     Anxiety  Depressive disorder  -Continue home Seroquel when clinically appropriate  -Continue home Lamictal when clinically appropriate  -Continue home venlafaxine when clinically appropriate     Code Status: FULL  VTE Prophylaxis: SCDs, Arixtra  PUD Prophylaxis: Pepcid      [place] & SUBJECTIVE   Patient presented to [facility] on 7/31/2021 via EMS after collapse at home.  Patient has severe allergies, and wife was at home when patient collapsed.  Per wife patient was not breathing and she was unsure if she felt a pulse.  She gave patient epi and attempted to deliver breaths, however did not feel like it was working.  She began chest compressions while on the phone with EMS.  Patient has beef and pork allergy, and had chicken salad today with which wife believes there may have been some cross-contamination.  Wife states that this is happened before, with most recent allergic reaction happening roughly 2 weeks ago however not this severe.  When EMS arrived patient was found to have a pulse and was moving around although not following commands with vomiting.   Patient was given additional Benadryl IV and Zofran in route.  Wife states that EMS attempted to intubate patient however they were unsuccessful.   Patient was given sepsis bolus in ED and was intubated in ED for airway protection.     In the ED, labs were obtained with abnormalities as follows: Glucose 110, ionized calcium 0.83, PTT 20.6.  ABG: pH 7.525, PCO2 22.6, PO2 114.6, HCO3 18.7.  UA negative for UTI.  CXR was negative for acute cardiopulmonary normalities.  CT head without contrast was negative for acute intracranial abnormality.  CT abdomen pelvis showed stomach distention with air.  Double areas of cortical hypoenhancement involving both kidneys, which could be related to hypotension, acute tubular necrosis, or polynephritis.  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    REVIEW OF SYSTEMS:  8/1: intubated, awake and agitated  8/2: Self extubated this morning.  No shortness of air.  Patient remains anxious with all sedation off.    MEDICATIONS     SCHEDULED MEDICATIONS:   budesonide, 0.5 mg, Nebulization, BID - RT  diphenhydrAMINE, 25 mg, Intravenous, Q6H  doxycycline, 100 mg, Oral, Q12H  famotidine, 20 mg, Intravenous, BID  fondaparinux, 2.5 mg, Subcutaneous, Q24H  insulin lispro, 0-7 Units, Subcutaneous, Q6H  ipratropium-albuterol, 3 mL, Nebulization, Q6H - RT  methylPREDNISolone sodium succinate, 125 mg, Intravenous, Q6H  QUEtiapine, 50 mg, Nasogastric, Nightly  risperiDONE, 0.25 mg, Oral, Q12H  sodium chloride, 10 mL, Intravenous, Q12H        CONTINUOUS INFUSIONS:   dexmedetomidine, 0.2-1.5 mcg/kg/hr, Last Rate: 1 mcg/kg/hr (08/02/21 0206)  fentanyl 10 mcg/mL,  mcg/hr, Last Rate: 200 mcg/hr (08/02/21 0631)  midazolam, 1-10 mg/hr, Last Rate: 5 mg/hr (08/02/21 0549)  propofol, 5-50 mcg/kg/min, Last Rate: Stopped (08/02/21 0401)  sodium chloride, 50 mL/hr, Last Rate: 50 mL/hr (08/01/21  "1701)        PRN MEDS:  •  acetaminophen **OR** acetaminophen  •  dextrose  •  dextrose  •  glucagon (human recombinant)  •  insulin lispro **AND** insulin lispro  •  nitroglycerin  •  ondansetron **OR** ondansetron  •  [COMPLETED] Insert peripheral IV **AND** sodium chloride  •  sodium chloride  •  vecuronium    OBJECTIVE     VITAL SIGNS:  /86   Pulse 86   Temp 97.2 °F (36.2 °C)   Resp 16   Ht 182.9 cm (72\")   Wt 73.3 kg (161 lb 9.6 oz)   SpO2 99%   BMI 21.92 kg/m²     I/O FROM PREVIOUS 3 SHIFTS:  I/O last 3 completed shifts:  In: 5476 [I.V.:1716; Other:222; NG/GT:267; IV Piggyback:3271]  Out: 4550 [Urine:4550]      I/O PREVIOUS 24 HOURS:   Intake/Output                         07/31/21 0701 - 08/01/21 0700 08/01/21 0701 - 08/02/21 0700     1854-5635 9525-4704 Total 7976-4714 4002-8618 Total                 Intake    P.O.  --  0 0  --  -- --    I.V.  --  36 36  301  1379 1680    Other  --  -- --  --  222 222    Flush/ Irrigation Intake (mL) (NG/OG Tube Nasogastric Right nostril) -- -- -- -- 222 222    NG/GT  --  -- --  --  267 267    IV Piggyback  --  3271 3271  --  -- --    Total Intake -- 3307 3307 301 1868 2169       Output    Urine  --  2600 2600  1100  850 1950    Total Output -- 2600 2600 8230 963 5330             NET BALANCE SINCE ADMISSION:  Net IO Since Admission: 926 mL [08/02/21 0916]     PHYSICAL EXAM:  General Appearance:  Alert, agitated at times  Head:  Normocephalic, without obvious abnormality, atraumatic.   Eyes:  PERRL, conjunctiva/corneas clear, EOM not assessed     Neck:  Supple, no JVD, trachea midline  Lungs:   Coarse but clear throughout, no rhonchi or wheezing, respirations unlabored without accessory muscle use  Chest wall:  Symmetrical chest wall movement  Heart:  Sinus tachycardia, S1 and S2 normal, no murmur, rub or gallop  Abdomen:  Soft, non-distended, bowel sounds active all four quadrants.  Nontender.  No rebound or guarding  Extremities:  No clubbing, no cyanosis or " edema  Skin:  No rashes or lesions  Neurologic:   Alert and oriented x3.  No lateralizing deficits      RESULTS     LABS:  Lab Results (last 24 hours)       Procedure Component Value Units Date/Time    Comprehensive Metabolic Panel [[id number]]  (Abnormal) Collected: 08/02/21 0610    Specimen: Blood Updated: 08/02/21 0708     Glucose 151 mg/dL      BUN 15 mg/dL      Creatinine 1.04 mg/dL      Sodium 141 mmol/L      Potassium 4.8 mmol/L      Comment: Slight hemolysis detected by analyzer. Results may be affected.        Chloride 106 mmol/L      CO2 26.0 mmol/L      Calcium 8.8 mg/dL      Total Protein 5.9 g/dL      Albumin 4.10 g/dL      ALT (SGPT) 20 U/L      AST (SGOT) 19 U/L      Alkaline Phosphatase 51 U/L      Total Bilirubin 0.4 mg/dL      eGFR Non African Amer 80 mL/min/1.73      Globulin 1.8 gm/dL      A/G Ratio 2.3 g/dL      BUN/Creatinine Ratio 14.4     Anion Gap 9.0 mmol/L     Narrative:      GFR Normal >60  Chronic Kidney Disease <60  Kidney Failure <15      Phosphorus [[id number]]  (Normal) Collected: 08/02/21 0610    Specimen: Blood Updated: 08/02/21 0708     Phosphorus 3.1 mg/dL     Magnesium [[id number]]  (Normal) Collected: 08/02/21 0610    Specimen: Blood Updated: 08/02/21 0708     Magnesium 2.2 mg/dL     Lactic Acid, Plasma [[id number]]  (Abnormal) Collected: 08/02/21 0610    Specimen: Blood Updated: 08/02/21 0700     Lactate 2.3 mmol/L     CBC & Differential [[id number]]  (Abnormal) Collected: 08/02/21 0610    Specimen: Blood Updated: 08/02/21 0636    Narrative:      The following orders were created for panel order CBC & Differential.  Procedure                               Abnormality         Status                     ---------                               -----------         ------                     CBC Auto Differential[[id number]]        Abnormal            Final result                 Please view results for these tests on the individual orders.    CBC Auto Differential [[id number]]   (Abnormal) Collected: 08/02/21 0610    Specimen: Blood Updated: 08/02/21 0636     WBC 10.10 10*3/mm3      RBC 4.31 10*6/mm3      Hemoglobin 14.1 g/dL      Hematocrit 41.5 %      MCV 96.3 fL      MCH 32.6 pg      MCHC 33.9 g/dL      RDW 13.0 %      RDW-SD 42.9 fl      MPV 8.4 fL      Platelets 237 10*3/mm3      Neutrophil % 90.9 %      Lymphocyte % 3.0 %      Monocyte % 5.9 %      Eosinophil % 0.0 %      Basophil % 0.2 %      Neutrophils, Absolute 9.10 10*3/mm3      Lymphocytes, Absolute 0.30 10*3/mm3      Monocytes, Absolute 0.60 10*3/mm3      Eosinophils, Absolute 0.00 10*3/mm3      Basophils, Absolute 0.00 10*3/mm3      nRBC 0.0 /100 WBC     POC Glucose Once [[id number]]  (Abnormal) Collected: 08/02/21 0522    Specimen: Blood Updated: 08/02/21 0523     Glucose 156 mg/dL      Comment: Serial Number: [id number]:  [id number]       POC Glucose Once [[id number]]  (Abnormal) Collected: 08/02/21 0041    Specimen: Blood Updated: 08/02/21 0043     Glucose 161 mg/dL      Comment: Serial Number: [id number]:  [id number]       Lactic Acid, Plasma [[id number]]  (Normal) Collected: 08/02/21 0007    Specimen: Blood Updated: 08/02/21 0038     Lactate 1.2 mmol/L     Blood Culture - Blood, Arm, Right [[id number]] Collected: 07/31/21 2232    Specimen: Blood from Arm, Right Updated: 08/01/21 2245     Blood Culture No growth at 24 hours    Blood Culture - Blood, Arm, Left [[id number]] Collected: 07/31/21 2227    Specimen: Blood from Arm, Left Updated: 08/01/21 2245     Blood Culture No growth at 24 hours    Lactic Acid, Plasma [[id number]]  (Normal) Collected: 08/01/21 1831    Specimen: Blood Updated: 08/01/21 1856     Lactate 1.4 mmol/L     POC Glucose Once [[id number]]  (Abnormal) Collected: 08/01/21 1815    Specimen: Blood Updated: 08/01/21 1818     Glucose 119 mg/dL      Comment: Serial Number: [id number]:  [id number]                RADIOLOGY:  XR Chest 1 View    Result Date: 8/1/2021  DATE OF EXAM: 8/1/2021 2:35 PM   PROCEDURE: XR CHEST 1 VW-  INDICATIONS: Acute respiratory failure, endotracheal tube insertion.  COMPARISON: 08/01/2021 at 0439 hours.  TECHNIQUE: Single radiographic view of the chest was obtained.  FINDINGS: The tip of the endotracheal tube is in good position located between the thoracic inlet and aortic knob. There has been placement of a nasogastric tube with its tip terminating in the fundus of the stomach. The heart size is normal. The pulmonary vascular markings are normal. The lungs and pleural spaces are clear of active disease.       1. The endotracheal tube is in good position. 2. Placement of a nasogastric tube with the tip terminating in the fundus of the stomach. 3. No active pulmonary disease.  Electronically Signed By-Dread Donaldson MD On:8/1/2021 2:59 PM This report was finalized on [id number] by  Dread Donaldson MD.    XR Abdomen KUB    Result Date: 8/1/2021  DATE OF EXAM: 8/1/2021 2:30 PM  PROCEDURE: XR ABDOMEN KUB-  INDICATIONS: Nasogastric tube placement.  COMPARISON: 01/17/2015.  TECHNIQUE: Single radiographic view of the abdomen was obtained.   FINDINGS: The tip of the nasogastric tube terminates in the fundus of the stomach. The pelvis and most of the lower abdomen were excluded from the field-of-view. The visualized bowel gas pattern is unremarkable.      The tip of the nasogastric tube terminates within the fundus of the stomach.  Electronically Signed By-Dread Donaldson MD On:8/1/2021 3:00 PM This report was finalized on [id number] by  Dread Donaldson MD.      ECHOCARDIOGRAM:        I reviewed the patient's new clinical results.    This note has been scribed by me for pulmonary attending physician.      Electronically signed by DOLORES Wiggins, 08/02/21 at 09:16 EDT.     I personally have examined  and interviewed the patient. I have reviewed the history, data, problems, assessment and plan with our NP.  Critical care time in direct medical management (  34 ) minutes  Electronically signed by  Samson Roman MD, D,ABSM, 21, 10:06 PM EDT.         Electronically signed by Samson Roman MD at 21 2203     Priscilla Newman MD at 21 0756          PULMONARY/CRITICAL CARE PROGRESS NOTE       NAME:     Warren Giraldo   AGE:     40 y.o.   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    Respiratory arrest (CMS/[facility])    Aspiration pneumonia (CMS/[facility])    Sepsis without septic shock (CMS/[facility])       Multidisciplinary Rounds:  -Events noted  -intubated, awake and agitated  -sedation discontinued, +leak test  -proceed with extubation    Assessment and plan  Acute respiratory failure requiring intubation, ?2/2 anaphylaxis from food allergy  Witnessed respiratory arrest at home  Possible aspiration pneumonia  ?Cardiac arrest  -Events at home noted, unclear if patient had cardiac arrest  -Patient intubated for airway protection  -Vent settings, ABGs, chest x-ray reviewed  -Wean support to keep O2 saturation > 91%  -Continue Benadryl and Pepcid  -Continue systemic steroids  -Duoneb  -Pulmicort     Rule out sepsis without shock, secondary to possible aspiration pneumonia  -Witnessed emesis prior to arrival and in ED  -IVF bolus 30 mL/kg per sepsis protocol given in ED  -Continue IVFs  -Lactic Acid 2.2, monitor and trend labs until normalized.  -Blood cultures-pending  -UA-negative  -Procalcitonin-not elevated, CRP-not elevated  -Received cefepime empirically.  Will discontinue and monitor off antibiotics     Gastroesophageal reflux disease with esophagitis  Peptic ulcer disease  -Continue home sucralfate when clinically appropriate  -Continue home  Pepcid when clinically appropriate     Numerous nonobstructing bilateral renal calculi  -Osorio catheter placed in ED  -Continue fluids  -Renal ultrasound reviewed, nonobstructing stone in the right kidney  -Consider nephrology consult if needed     Anxiety  Depressive disorder  -Continue home Seroquel when clinically appropriate  -Continue home Lamictal when clinically appropriate  -Continue home venlafaxine when clinically appropriate     Code Status: FULL  VTE Prophylaxis: SCDs, Arixtra  PUD Prophylaxis: Pepcid      [place] & SUBJECTIVE   Patient presented to [facility] on 7/31/2021 via EMS after collapse at home.  Patient has severe allergies, and wife was at home when patient collapsed.  Per wife patient was not breathing and she was unsure if she felt a pulse.  She gave patient epi and attempted to deliver breaths, however did not feel like it was working.  She began chest compressions while on the phone with EMS.  Patient has beef and pork allergy, and had chicken salad today with which wife believes there may have been some cross-contamination.  Wife states that this is happened before, with most recent allergic reaction happening roughly 2 weeks ago however not this severe.  When EMS arrived patient was found to have a pulse and was moving around although not following commands with vomiting.  Patient was given additional Benadryl IV and Zofran in route.  Wife states that EMS attempted to intubate patient however they were unsuccessful.   Patient was given sepsis bolus in ED and was intubated in ED for airway protection.     In the ED, labs were obtained with abnormalities as follows: Glucose 110, ionized calcium 0.83, PTT 20.6.  ABG: pH 7.525, PCO2 22.6, PO2 114.6, HCO3 18.7.  UA negative for UTI.  CXR was negative for acute cardiopulmonary normalities.  CT head without contrast was negative for acute intracranial abnormality.  CT abdomen pelvis showed stomach distention with air.  Double areas of  "cortical hypoenhancement involving both kidneys, which could be related to hypotension, acute tubular necrosis, or polynephritis.  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    REVIEW OF SYSTEMS:  8/1: intubated, awake and agitated    MEDICATIONS     SCHEDULED MEDICATIONS:   budesonide, 0.5 mg, Nebulization, BID - RT  chlorhexidine, 15 mL, Mouth/Throat, Q12H  diphenhydrAMINE, 25 mg, Intravenous, Q6H  famotidine, 20 mg, Intravenous, BID  ipratropium-albuterol, 3 mL, Nebulization, 4x Daily - RT  methylPREDNISolone sodium succinate, 20 mg, Intravenous, Q6H  sodium chloride, 10 mL, Intravenous, Q12H        CONTINUOUS INFUSIONS:   dexmedetomidine, 0.2-1.5 mcg/kg/hr, Last Rate: Stopped (08/01/21 0755)  fentanyl 10 mcg/mL, 100 mcg/hr, Last Rate: Stopped (08/01/21 0729)  propofol, 5-50 mcg/kg/min, Last Rate: Stopped (08/01/21 0729)        PRN MEDS:  •  acetaminophen **OR** acetaminophen  •  nitroglycerin  •  ondansetron **OR** ondansetron  •  [COMPLETED] Insert peripheral IV **AND** sodium chloride  •  sodium chloride    OBJECTIVE     VITAL SIGNS:  /72   Pulse 109   Temp 97.7 °F (36.5 °C)   Resp 26   Ht 182.9 cm (72\")   Wt 76.4 kg (168 lb 6.9 oz)   SpO2 98%   BMI 22.84 kg/m²     I/O FROM PREVIOUS 3 SHIFTS:  I/O last 3 completed shifts:  In: 3307 [I.V.:36; IV Piggyback:3271]  Out: 2600 [Urine:2600]      I/O PREVIOUS 24 HOURS:   Intake/Output                 07/31/21 0701 - 08/01/21 0700     [id number] 8565-8209 Total              Intake    P.O.  --  0 0    I.V.  --  36 36    IV Piggyback  --  3271 3271    Total Intake -- 3307 3307       Output    Urine  --  2600 2600    Total Output -- 2600 2600           NET BALANCE SINCE ADMISSION:  Net IO Since Admission: 707 mL [08/01/21 0756]     PHYSICAL EXAM:  General Appearance:  Alert, agitated  Head:  Normocephalic, without obvious abnormality, atraumatic. OETT  Eyes:  " "PERRL, conjunctiva/corneas clear, EOM not assessed     Neck:  Supple, no JVD, trachea midline  Lungs:   Coarse but clear throughout, no rhonchi or wheezing, respirations unlabored without accessory muscle use  Chest wall:  Symmetrical chest wall movement  Heart:  Sinus tachycardia, S1 and S2 normal, no murmur, rub or gallop  Abdomen:  Soft, non-distended, bowel sounds active all four quadrants  Extremities:  No clubbing, no cyanosis or edema  Skin:  No rashes or lesions  Neurologic:   Alert and agitated. Follows commands.  No lateralizing deficits noted      RESULTS     LABS:  Lab Results (last 24 hours)     Procedure Component Value Units Date/Time    Lactic Acid, Plasma [[id number]]  (Normal) Collected: 08/01/21 0641    Specimen: Blood Updated: 08/01/21 0710     Lactate 1.6 mmol/L     C-reactive Protein [[id number]]  (Normal) Collected: 08/01/21 0446    Specimen: Blood Updated: 08/01/21 0640     C-Reactive Protein <0.30 mg/dL     Procalcitonin [[id number]]  (Normal) Collected: 08/01/21 0446    Specimen: Blood Updated: 08/01/21 0636     Procalcitonin <0.02 ng/mL     Narrative:      As a Marker for Sepsis (Non-Neonates):     1. <0.5 ng/mL represents a low risk of severe sepsis and/or septic shock.  2. >2 ng/mL represents a high risk of severe sepsis and/or septic shock.    As a Marker for Lower Respiratory Tract Infections that require antibiotic therapy:  PCT on Admission     Antibiotic Therapy             6-12 Hrs later  >0.5                          Strongly Recommended            >0.25 - <0.5             Recommended  0.1 - 0.25                  Discouraged                       Remeasure/reassess PCT  <0.1                         Strongly Discouraged         Remeasure/reassess PCT      As 28 day mortality risk marker: \"Change in Procalcitonin Result\" (>80% or <=80%) if Day 0 (or Day 1) and Day 4 values are available. Refer to [email or web address]    Change in PCT <=80 %   A decrease of PCT levels " below or equal to 80% defines a positive change in PCT test result representing a higher risk for 28-day all-cause mortality of patients diagnosed with severe sepsis or septic shock.    Change in PCT >80 %   A decrease of PCT levels of more than 80% defines a negative change in PCT result representing a lower risk for 28-day all-cause mortality of patients diagnosed with severe sepsis or septic shock.              Results may be falsely decreased if patient taking Biotin.     Comprehensive Metabolic Panel [[id number]]  (Abnormal) Collected: 08/01/21 0446    Specimen: Blood Updated: 08/01/21 0518     Glucose 129 mg/dL      BUN 12 mg/dL      Creatinine 0.99 mg/dL      Sodium 139 mmol/L      Potassium 4.8 mmol/L      Comment: Slight hemolysis detected by analyzer. Results may be affected.        Chloride 105 mmol/L      CO2 23.0 mmol/L      Calcium 8.3 mg/dL      Total Protein 6.1 g/dL      Albumin 4.30 g/dL      ALT (SGPT) 21 U/L      AST (SGOT) 20 U/L      Alkaline Phosphatase 51 U/L      Total Bilirubin 0.2 mg/dL      eGFR Non African Amer 85 mL/min/1.73      Globulin 1.8 gm/dL      A/G Ratio 2.4 g/dL      BUN/Creatinine Ratio 12.1     Anion Gap 11.0 mmol/L     Narrative:      GFR Normal >60  Chronic Kidney Disease <60  Kidney Failure <15      Magnesium [[id number]]  (Normal) Collected: 08/01/21 0446    Specimen: Blood Updated: 08/01/21 0518     Magnesium 2.0 mg/dL     Phosphorus [[id number]]  (Normal) Collected: 08/01/21 0446    Specimen: Blood Updated: 08/01/21 0518     Phosphorus 3.5 mg/dL     CBC & Differential [[id number]]  (Abnormal) Collected: 08/01/21 0446    Specimen: Blood Updated: 08/01/21 0500    Narrative:      The following orders were created for panel order CBC & Differential.  Procedure                               Abnormality         Status                     ---------                               -----------         ------                     CBC Auto Differential[[id number]]        Abnormal             Final result                 Please view results for these tests on the individual orders.    CBC Auto Differential [[id number]]  (Abnormal) Collected: 08/01/21 0446    Specimen: Blood Updated: 08/01/21 0500     WBC 8.30 10*3/mm3      RBC 4.53 10*6/mm3      Hemoglobin 14.7 g/dL      Hematocrit 43.7 %      MCV 96.4 fL      MCH 32.5 pg      MCHC 33.7 g/dL      RDW 13.1 %      RDW-SD 43.8 fl      MPV 8.3 fL      Platelets 286 10*3/mm3      Neutrophil % 94.0 %      Lymphocyte % 4.9 %      Monocyte % 0.7 %      Eosinophil % 0.0 %      Basophil % 0.4 %      Neutrophils, Absolute 7.80 10*3/mm3      Lymphocytes, Absolute 0.40 10*3/mm3      Monocytes, Absolute 0.10 10*3/mm3      Eosinophils, Absolute 0.00 10*3/mm3      Basophils, Absolute 0.00 10*3/mm3      nRBC 0.0 /100 WBC     Blood Gas, Arterial - [[id number]]  (Abnormal) Collected: 08/01/21 0433    Specimen: Arterial Blood Updated: 08/01/21 0437     Site Right Radial     Rahat's Test Positive     pH, Arterial 7.281 pH units      pCO2, Arterial 54.8 mm Hg      pO2, Arterial 212.1 mm Hg      HCO3, Arterial 25.8 mmol/L      Base Excess, Arterial -1.9 mmol/L      Comment: Serial Number: [id number]:  [id number]        O2 Saturation, Arterial 99.6 %      CO2 Content 27.5 mmol/L      Barometric Pressure for Blood Gas --     Comment: N/A        Modality Adult Vent     FIO2 50 %      Ventilator Mode ;VS     Set Tidal Volume 400     Rate 22.0000 Breaths/minute      PEEP 5     PIP 11 cmH2O      Hemodilution No     Respiratory Rate 18    STAT Lactic Acid, Reflex [[id number]]  (Abnormal) Collected: 08/01/21 0101    Specimen: Blood Updated: 08/01/21 0128     Lactate 2.2 mmol/L     POC Glucose Once [[id number]]  (Abnormal) Collected: 08/01/21 0103    Specimen: Blood Updated: 08/01/21 0108     Glucose 116 mg/dL      Comment: Serial Number: [id number]:  [id number]       COVID PRE-OP / PRE-PROCEDURE SCREENING ORDER (NO ISOLATION) - Swab, Nasopharynx [[id number]]  (Normal) Collected:  07/31/21 2305    Specimen: Swab from Nasopharynx Updated: 08/01/21 0030    Narrative:      The following orders were created for panel order COVID PRE-OP / PRE-PROCEDURE SCREENING ORDER (NO ISOLATION) - Swab, Nasopharynx.  Procedure                               Abnormality         Status                     ---------                               -----------         ------                     COVID-19,CEPHEID,COR/JENNIFER...[[id number]]  Normal              Final result                 Please view results for these tests on the individual orders.    COVID-19,CEPHEID,COR/JENNIFER/PAD/DIANDRA IN-HOUSE(OR EMERGENT/ADD-ON),NP SWAB IN TRANSPORT MEDIA 3-4 HR TAT, RT-PCR - Swab, Nasopharynx [[id number]]  (Normal) Collected: 07/31/21 2305    Specimen: Swab from Nasopharynx Updated: 08/01/21 0030     COVID19 Not Detected    Narrative:      Fact sheet for providers: https://www.fda.gov/media/166305/download     Fact sheet for patients: https://www.fda.gov/media/659656/download  Fact sheet for providers: https://www.fda.gov/media/890534/download     Fact sheet for patients: https://www.fda.gov/media/245992/download    Urine Drug Screen - Urine, Clean Catch [[id number]]  (Normal) Collected: 07/31/21 2134    Specimen: Urine, Clean Catch Updated: 07/31/21 2259     Amphet/Methamphet, Screen Negative     Barbiturates Screen, Urine Negative     Benzodiazepine Screen, Urine Negative     Cocaine Screen, Urine Negative     Opiate Screen Negative     THC, Screen, Urine Negative     Methadone Screen, Urine Negative     Oxycodone Screen, Urine Negative    Narrative:      Negative Thresholds Per Drugs Screened:    Amphetamines                 500 ng/ml  Barbiturates                 200 ng/ml  Benzodiazepines              100 ng/ml  Cocaine                      300 ng/ml  Methadone                    300 ng/ml  Opiates                      300 ng/ml  Oxycodone                    100 ng/ml  THC                           50 ng/ml    The Normal Value for all  "drugs tested is negative. This report includes final unconfirmed screening results to be used for medical treatment purposes only. Unconfirmed results must not be used for non-medical purposes such as employment or legal testing. Clinical consideration should be applied to any drug of abuse test, particularly when unconfirmed results are used.          All urine drugs of abuse requests without chain of custody are for medical screening purposes only.  False positives are possible.      Blood Culture - Blood, Arm, Left [[id number]] Collected: 07/31/21 2227    Specimen: Blood from Arm, Left Updated: 07/31/21 2234    Blood Culture - Blood, Arm, Right [[id number]] Collected: 07/31/21 2232    Specimen: Blood from Arm, Right Updated: 07/31/21 2234    Salicylate Level [[id number]]  (Normal) Collected: 07/31/21 2130    Specimen: Blood Updated: 07/31/21 2214     Salicylate <0.3 mg/dL     Procalcitonin [[id number]]  (Normal) Collected: 07/31/21 2130    Specimen: Blood Updated: 07/31/21 2201     Procalcitonin 0.03 ng/mL     Narrative:      As a Marker for Sepsis (Non-Neonates):     1. <0.5 ng/mL represents a low risk of severe sepsis and/or septic shock.  2. >2 ng/mL represents a high risk of severe sepsis and/or septic shock.    As a Marker for Lower Respiratory Tract Infections that require antibiotic therapy:  PCT on Admission     Antibiotic Therapy             6-12 Hrs later  >0.5                          Strongly Recommended            >0.25 - <0.5             Recommended  0.1 - 0.25                  Discouraged                       Remeasure/reassess PCT  <0.1                         Strongly Discouraged         Remeasure/reassess PCT      As 28 day mortality risk marker: \"Change in Procalcitonin Result\" (>80% or <=80%) if Day 0 (or Day 1) and Day 4 values are available. Refer to [email or web address]-pct-[email or web address]/    Change in PCT <=80 %   A decrease of PCT levels below or equal to 80% defines a positive change in PCT " test result representing a higher risk for 28-day all-cause mortality of patients diagnosed with severe sepsis or septic shock.    Change in PCT >80 %   A decrease of PCT levels of more than 80% defines a negative change in PCT result representing a lower risk for 28-day all-cause mortality of patients diagnosed with severe sepsis or septic shock.              Results may be falsely decreased if patient taking Biotin.     TSH [[id number]]  (Normal) Collected: 07/31/21 2130    Specimen: Blood Updated: 07/31/21 2201     TSH 1.660 uIU/mL     Comprehensive Metabolic Panel [[id number]]  (Abnormal) Collected: 07/31/21 2130    Specimen: Blood Updated: 07/31/21 2157     Glucose 99 mg/dL      BUN 12 mg/dL      Creatinine 1.23 mg/dL      Sodium 143 mmol/L      Potassium 4.2 mmol/L      Comment: Slight hemolysis detected by analyzer. Results may be affected.        Chloride 106 mmol/L      CO2 20.0 mmol/L      Calcium 9.5 mg/dL      Total Protein 7.0 g/dL      Albumin 4.70 g/dL      ALT (SGPT) 24 U/L      AST (SGOT) 23 U/L      Alkaline Phosphatase 53 U/L      Total Bilirubin 0.3 mg/dL      eGFR Non African Amer 66 mL/min/1.73      Globulin 2.3 gm/dL      A/G Ratio 2.0 g/dL      BUN/Creatinine Ratio 9.8     Anion Gap 17.0 mmol/L     Narrative:      GFR Normal >60  Chronic Kidney Disease <60  Kidney Failure <15      Troponin [[id number]]  (Normal) Collected: 07/31/21 2130    Specimen: Blood Updated: 07/31/21 2157     Troponin T <0.010 ng/mL     Narrative:      Troponin T Reference Range:  <= 0.03 ng/mL-   Negative for AMI  >0.03 ng/mL-     Abnormal for myocardial necrosis.  Clinicians would have to utilize clinical acumen, EKG, Troponin and serial changes to determine if it is an Acute Myocardial Infarction or myocardial injury due to an underlying chronic condition.       Results may be falsely decreased if patient taking Biotin.      Lipase [[id number]]  (Normal) Collected: 07/31/21 2130    Specimen: Blood Updated: 07/31/21 2157      Lipase 29 U/L     Acetaminophen Level [[id number]]  (Normal) Collected: 07/31/21 2130    Specimen: Blood Updated: 07/31/21 2157     Acetaminophen <5.0 mcg/mL     Narrative:      Acetaminophen Therapeutic Range  5-20 ug/mL      Hours after ingestion            Toxic Value    4 Hours                           150 ug/mL    8 Hours                            70 ug/mL   12 Hours                            40 ug/mL   16 Hours                            20 ug/mL    These values apply to a single ingestion only.     CK [[id number]]  (Normal) Collected: 07/31/21 2130    Specimen: Blood Updated: 07/31/21 2157     Creatine Kinase 109 U/L     Ethanol [[id number]] Collected: 07/31/21 2130    Specimen: Blood Updated: 07/31/21 2157     Ethanol % 0.164 %     Narrative:      Plasma Ethanol Clinical Symptoms:    ETOH (%)               Clinical Symptom  .01-.05              No apparent influence  .03-.12              Euphoria, Diminished judgment and attention   .09-.25              Impaired comprehension, Muscle incoordination  .18-.30              Confusion, Staggered gait, Slurred speech  .25-.40              Markedly decreased response to stimuli, unable to stand or                        walk, vomitting, sleep or stupor  .35-.50              Comatose, Anesthesia, Subnormal body temperature        Magnesium [[id number]]  (Normal) Collected: 07/31/21 2130    Specimen: Blood Updated: 07/31/21 2157     Magnesium 2.2 mg/dL     Protime-INR [[id number]]  (Normal) Collected: 07/31/21 2130    Specimen: Blood Updated: 07/31/21 2153     Protime 11.2 Seconds      INR 1.01    aPTT [[id number]]  (Abnormal) Collected: 07/31/21 2130    Specimen: Blood Updated: 07/31/21 2153     PTT 20.6 seconds     Urinalysis With Culture If Indicated - Urine, Catheter [[id number]]  (Normal) Collected: 07/31/21 2132    Specimen: Urine, Catheter Updated: 07/31/21 2139     Color, UA Yellow     Appearance, UA Clear     pH, UA 6.0     Specific Gravity, UA <=1.005      Glucose, UA Negative     Ketones, UA Negative     Bilirubin, UA Negative     Blood, UA Negative     Protein, UA Negative     Leuk Esterase, UA Negative     Nitrite, UA Negative     Urobilinogen, UA 0.2 E.U./dL    Narrative:      Urine microscopic not indicated.    CBC & Differential [[id number]]  (Normal) Collected: 07/31/21 2130    Specimen: Blood from Arm, Left Updated: 07/31/21 2135    Narrative:      The following orders were created for panel order CBC & Differential.  Procedure                               Abnormality         Status                     ---------                               -----------         ------                     CBC Auto Differential[[id number]]        Normal              Final result                 Please view results for these tests on the individual orders.    CBC Auto Differential [[id number]]  (Normal) Collected: 07/31/21 2130    Specimen: Blood from Arm, Left Updated: 07/31/21 2135     WBC 8.00 10*3/mm3      RBC 4.96 10*6/mm3      Hemoglobin 16.2 g/dL      Hematocrit 47.9 %      MCV 96.6 fL      MCH 32.6 pg      MCHC 33.7 g/dL      RDW 12.9 %      RDW-SD 43.3 fl      MPV 8.2 fL      Platelets 314 10*3/mm3      Neutrophil % 56.5 %      Lymphocyte % 30.8 %      Monocyte % 10.7 %      Eosinophil % 1.4 %      Basophil % 0.6 %      Neutrophils, Absolute 4.50 10*3/mm3      Lymphocytes, Absolute 2.50 10*3/mm3      Monocytes, Absolute 0.90 10*3/mm3      Eosinophils, Absolute 0.10 10*3/mm3      Basophils, Absolute 0.10 10*3/mm3      nRBC 0.1 /100 WBC     POCT Electrolytes +HGB +HCT [[id number]]  (Abnormal) Collected: 07/31/21 2124    Specimen: Blood Updated: 07/31/21 2132     Sodium 144 mmol/L      POC Potassium 4.1 mmol/L      Ionized Calcium 0.83 mmol/L      Comment: Serial Number: [id number]:  [id number]        Glucose 110 mg/dL      Hematocrit 43 %      Hemoglobin 14.7 g/dL     POC Lactate [[id number]]  (Abnormal) Collected: 07/31/21 2124    Specimen: Blood Updated: 07/31/21 2131      Lactate 6.5 mmol/L      Comment: Serial Number: [id number]:  [id number]       POC Glucose Once [[id number]]  (Abnormal) Collected: 07/31/21 2124    Specimen: Blood Updated: 07/31/21 2128     Glucose 110 mg/dL      Comment: Serial Number: [id number]:  [id number]       Blood Gas, Arterial - [[id number]]  (Abnormal) Collected: 07/31/21 2124    Specimen: Arterial Blood Updated: 07/31/21 2128     Site Right Radial     Rahat's Test Positive     pH, Arterial 7.525 pH units      pCO2, Arterial 22.6 mm Hg      pO2, Arterial 114.6 mm Hg      HCO3, Arterial 18.7 mmol/L      Base Excess, Arterial -2.0 mmol/L      Comment: Serial Number: [id number]:  [id number]        O2 Saturation, Arterial 99.0 %      CO2 Content 19.4 mmol/L      Barometric Pressure for Blood Gas --     Comment: N/A        Modality Cannula     FIO2 40 %      Hemodilution No           RADIOLOGY:  CT Head Without Contrast    Result Date: 7/31/2021  EXAMINATION: CT HEAD WO CONTRAST DATE: 7/31/2021 10:10 PM  INDICATION: Unresponsive. Cardiac arrest.  COMPARISON: None available.  TECHNIQUE: Thin section noncontrast axial images were obtained through the head. Coronal reformatted images were created.  CT dose lowering techniques were used, to include: automated exposure control, adjustment for patient size, and or use of iterative  reconstruction FINDINGS: No acute intracranial hemorrhage. No acute territorial infarct. Gray-white differentiation is maintained. No acute territorial infarct. No intracranial mass or mass effect. No hydrocephalus. Basal cisterns are patent. Globes and orbits are unremarkable. Mild-to-moderate mucosal thickening in the right paranasal sinuses. Mastoid air cells are clear. Calvarium is intact.     1.  No acute intracranial abnormality. No findings of diffuse anoxic brain injury. If there is further concern, suggest repeat exam in 24 hours. 2.  Paranasal sinus mucosal thickening. Electronically signed by:  Neo Carrillo DO  7/31/2021 8:48  PM    CT Abdomen Pelvis With Contrast    Result Date: 7/31/2021  Exam: CT abdomen and pelvis with contrast Date: July 31, 2021 History: Unresponsive, abdominal pain Comparison: August 22, 2019 Technique: Contiguous axial CT images were obtained of the abdomen and pelvis following the uneventful intravenous administration of 100 mL Isovue-370 contrast. Additionally, sagittal and coronal reformatted images were obtained.  CT dose lowering techniques were used, to include: automated exposure control, adjustment for patient size, and or use of iterative reconstruction. Findings: Lung bases: There is compressive atelectasis in the lung bases. The heart is not enlarged. Liver: The liver is mildly hypodense. Spleen: Normal. Pancreas: Normal. Venograms: Normal. Gallbladder: The gallbladder is surgically absent. Kidneys: There are subtle areas of cortical hypoenhancement involving both kidneys. There are numerous nonobstructing bilateral renal calculi. There is no hydronephrosis or obstructive uropathy. Abdominal mesentery: There is no pathologic abdominal mass or adenopathy. Bowel loops: There is a moderate to large amount of retained colonic fecal debris. The appendix is surgically absent. The stomach is distended with air, fluid and debris. There is no small bowel obstruction. CT PELVIS: There is a Osorio catheter within the urinary bladder. Abdominal aorta: There is no aneurysm or dissection. 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 There is no evidence of osseous collapse. No acute fractures are identified.     1. The stomach is distended with air, fluid and debris. Etiology is uncertain. 2. Subtle areas of cortical hypoenhancement involving both kidneys. This could be related to hypotension, acute tubular necrosis or pyelonephritis. 3. 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 4. Numerous nonobstructing  bilateral renal calculi. 5. Constipation. Slot 63 Electronically signed by:  Leodan Topete M.D.  7/31/2021 8:39 PM    XR Chest 1 View    Result Date: 7/31/2021  FRONTAL VIEW OF THE CHEST CLINICAL INDICATION: Altered mental status. COMPARISON: 7/31/2021. FINDINGS: No focal consolidation, pleural effusion or pneumothorax. Cardiomediastinal morphology is normal. Osseous structures are unremarkable.     No acute cardiopulmonary abnormality. Electronically signed by:  Ryland Massey M.D.  7/31/2021 8:40 PM    XR Chest 1 View    Result Date: 7/31/2021  Exam:  Single view chest Date: July 31, 2021 History: Respiratory failure, unresponsive Comparison: Same day Technique: Single frontal radiograph of the chest was obtained Findings: There is a well-positioned endotracheal tube. The heart is not enlarged. Mediastinum and pulmonary vasculature are unremarkable. There is no pneumothorax or sizable pleural fluid collection.     Impression: 1. Well-positioned endotracheal tube. Slot 63 Electronically signed by:  Leodan Topete M.D.  7/31/2021 8:35 PM    CT Chest Pulmonary Embolism    Result Date: 7/31/2021  Exam: CTA thorax, PE protocol Date: July 31, 2021 History: Unresponsive, cardiac arrest, respiratory failure Comparison: None available Technique: Contiguous axial CT images obtained of the thorax following the uneventful intravenous administration of 100 mL Isovue-370 contrast. Additionally, sagittal, coronal and 3-D reformatted images were obtained. CT dose lowering techniques were used, to include: automated exposure control, adjustment for patient size, and or use of iterative reconstruction. Findings: Thoracic aorta: There is limited characterization of the ascending aorta secondary to motion artifact. There is no aneurysm. HEART: Normal. Pulmonary arteries: The pulmonary arteries are reasonably well visualized. There is no filling defect to suggest an acute pulmonary embolus. Mediastinum: There is a well-positioned  endotracheal tube. There is no pathologic mediastinal adenopathy. Lung parenchyma: There is compressive bibasilar atelectasis. There is no pneumothorax or pleural fluid collection. Upper abdomen: For evaluation of the abdomen, please see the full dictated report of the CT study of the abdomen and pelvis. Osseous structures: No acute fractures are identified.     1. No pulmonary embolus. 2. No acute abnormality. Slot 63 Electronically signed by:  Leodan Topete M.D.  7/31/2021 8:50 PM      ECHOCARDIOGRAM:       I reviewed the patient's new clinical results.    This note has been scribed by me for pulmonary attending physician.    Electronically signed by DOLORES Wiggins, 08/01/21 at 07:56 EDT.     Electronically signed by Priscilla Newman MD at 08/01/21 2334          Consult Notes (all)      Ryland Ames PA-C at 08/03/21 1845      Consult Orders    1. Inpatient Psychiatrist Consult [[id number]] ordered by Tyler Green APRN at 08/03/21 0859          Attestation signed by Christy Gandara MD at 08/04/21 0845    I have reviewed the mid-level documentation, and agree with the documentation, medical decision making and treatment plan as outlined by the mid-level provider.    This document has been electronically signed by Christy Gandara MD  August 4, 2021 08:45 EDT                                                                 Referring Provider: Dr Sarbjit Goyal MD  Reason for Consultation: Psychiatric Medication Management      Chief complaint Home medications not given correctly.    Subjective     History of present illness:    The patient is a 40 y.o. male who was admitted secondary to LOC determined to be from an anaphylactic reaction to a food allergy. Psychiatry was consulted for medication management, due to confusion about what the patient needed to receive while staying in the hospital to meet his psychiatric management needs. Upon evaluation the patient and his wife explained he has no  current psychiatric symptoms and has been stable on medication for bipolar disorder and PTSD for 4 years now. The patient and his wife were concerned because the patient was almost given Risperdol instead of Lamictal for a reason they did not understand. The patient has a history of reacting poorly to Risperdol in the past, as they contributed a week long psychiatric hospital stay at [facility] in 2014 to that medication. The patient's wife also was concerned that the patient was having difficulty getting his Lamictal in the morning instead of the evening prior to bed when it was presented to him. This was part of the regime the patient had with his at home medications. The patient denies any SI/HI, AVH, rich, anxiety or depressive symptoms.    Review of Systems   Pertinent items are noted in HPI    History    Past Psychiatric History:  Bipolar Disorder, unspecified  PTSD  -Psychiatric Hospitalizations: Once at [facility] in 2014  -Suicide Attempts: None  -Currently managed by: Gloria Paniagua at [facility].    Past Medical History:   Diagnosis Date   • Acne varioliformis    • Acute reaction to stress    • Allergic rhinitis    • Allergy to alpha-gal    • Anxiety    • Anxiety    • Costochondritis 03/04/2013   • Depression    • Gastroesophageal reflux disease with esophagitis    • History of kidney stones    • Migraine with aura    • PUD (peptic ulcer disease)           Family History   Problem Relation Age of Onset   • Diabetes Father         passed away due to complications   • Coronary artery disease Father    • Mental illness Neg Hx    • Alcohol abuse Neg Hx    • Drug abuse Neg Hx         Social History     Tobacco Use   • Smoking status: Never Smoker   • Smokeless tobacco: Never Used   Vaping Use   • Vaping Use: Never used   Substance Use Topics   • Alcohol use: Yes     Alcohol/week: 14.0 standard drinks     Types: 14 Cans of beer per week   • Drug use: Defer          Medications Prior to Admission    Medication Sig Dispense Refill Last Dose   • cetirizine (zyrTEC) 10 MG tablet Take 10 mg by mouth Daily.      • doxycycline (MONODOX) 100 MG capsule Take 1 capsule by mouth 2 (Two) Times a Day for 14 days. 28 capsule 0    • EPINEPHrine (EPIPEN) 0.3 MG/0.3ML solution auto-injector injection Inject 0.3 mg into the appropriate muscle as directed by prescriber.      • famotidine (PEPCID) 20 MG tablet Take 40 mg by mouth 2 (Two) Times a Day.      • lamoTRIgine (LaMICtal) 200 MG tablet Take 200 mg by mouth Every Night.      • LORazepam (ATIVAN) 1 MG tablet Take 1 mg by mouth 2 (Two) Times a Day As Needed.  2    • multivitamin (ONE-A-DAY MENS PO) Take 1 tablet by mouth Daily.      • QUEtiapine (SEROquel) 100 MG tablet Take 400 mg by mouth Every Night. 400 mg nightly      • QUEtiapine (SEROquel) 100 MG tablet Take 100 mg by mouth Daily. Takes 100 mg every morning      • venlafaxine 225 MG tablet sustained-release 24 hour 24 hr tablet Take 225 mg by mouth Daily With Breakfast.           Scheduled Meds:  cetirizine, 10 mg, Oral, Daily  diphenhydrAMINE, 12.5 mg, Intravenous, Q12H  doxycycline, 100 mg, Oral, Q12H  famotidine, 40 mg, Oral, Daily  fondaparinux, 2.5 mg, Subcutaneous, Q24H  insulin lispro, 0-7 Units, Subcutaneous, 4x Daily With Meals & Nightly  [START ON 8/4/2021] lamoTRIgine, 200 mg, Oral, Daily  multivitamin, 1 tablet, Oral, Daily  predniSONE, 40 mg, Oral, Daily With Breakfast  QUEtiapine, 100 mg, Oral, Daily  QUEtiapine, 400 mg, Oral, Nightly  sodium chloride, 10 mL, Intravenous, Q12H  venlafaxine XR, 225 mg, Oral, Daily With Breakfast         Continuous Infusions:       PRN Meds:  •  acetaminophen **OR** acetaminophen  •  dextrose  •  dextrose  •  glucagon (human recombinant)  •  insulin lispro **AND** insulin lispro  •  LORazepam **OR** LORazepam **OR** LORazepam **OR** LORazepam **OR** LORazepam **OR** LORazepam  •  nitroglycerin  •  ondansetron **OR** ondansetron  •  [COMPLETED] Insert peripheral IV  "**AND** sodium chloride  •  sodium chloride      Allergies:  Bee venom, Beef-derived products, Latex, Milk protein, Other, and Pork-derived products    Objective       Physical Exam:    Vital Signs:   /91 (BP Location: Left arm, Patient Position: Lying)   Pulse 83   Temp 98.8 °F (37.1 °C) (Oral)   Resp 14   Ht 182.9 cm (72\")   Wt 73.3 kg (161 lb 9.6 oz)   SpO2 100%   BMI 21.92 kg/m²     General Appearance:    Well-developed, well-nourished, pleasant, cooperative, and in NAD.   Head:    Normocephalic, without obvious deformity, atraumatic.   Eyes:            Lids and lashes normal, conjunctivae and sclerae normal, no   icterus, no pallor, corneas clear.   Skin:  Neurologic:  Musculoskeletal:   No bleeding, bruising or rash.  Cranial nerves 2 - 12 grossly intact, sensation intact.  Muscle strength: grade 5  Muscle tone: Normal  Abnormal Movements: No tremors or abnormal involuntary movements  Gait: Deferred, in bed     Mental Status Exam:   Orientation:  To person, Place, Time and Situation  Memory: Recent and remote memory Intact  Mood/Affect: Euthymic  Suicidal Ideations: None  Homicidal Ideations:  None  Hallucinations: None  Delusions:  None  Obsessions: None  Behavior and Psychomotor Activity: Appropriate  Speech:  Normal  Thought Process: Goal directed  Associations: Intact  Thought Content: Normal  Language: Normal  Concentration and computation: Fair  Attention Span: Good  Fund of Knowledge: Fair  Reliability: fair  Insight into mental health: Good  Judgement: Good  Impulse Control:  Good  Hygiene: good  Cooperation:  Cooperative  Eye Contact:  Good    Medications and allergies reviewed.    Lab Results   Component Value Date    GLUCOSE 117 (H) 08/03/2021    CALCIUM 8.8 08/03/2021     08/03/2021    K 4.1 08/03/2021    CO2 28.0 08/03/2021     08/03/2021    BUN 19 08/03/2021    CREATININE 0.96 08/03/2021    EGFRIFAFRI 104 07/23/2021    EGFRIFNONA 88 08/03/2021    BCR 19.8 08/03/2021    " ANIONGAP 6.0 08/03/2021       Last Urine Toxicity     LAST URINE TOXICITY RESULTS Latest Ref Rng & Units 7/31/2021    BARBITURATES SCREEN Negative Negative    BENZODIAZEPINE SCREEN, URINE Negative Negative    COCAINE SCREEN, URINE Negative Negative    METHADONE SCREEN, URINE Negative Negative          No results found for: PHENYTOIN, PHENOBARB, VALPROATE, CBMZ    Lab Results   Component Value Date     08/03/2021    BUN 19 08/03/2021    CREATININE 0.96 08/03/2021    TSH 1.660 07/31/2021    WBC 11.40 (H) 08/03/2021       Brief Urine Lab Results  (Last result in the past 365 days)      Color   Clarity   Blood   Leuk Est   Nitrite   Protein   CREAT   Urine HCG        07/31/21 2132 Yellow Clear Negative Negative Negative Negative               Assessment/Plan       Anxiety    Gastroesophageal reflux disease with esophagitis    PUD (peptic ulcer disease)    Anaphylaxis due to food    Respiratory arrest (CMS/[facility])    Aspiration pneumonia (CMS/[facility])    Sepsis without septic shock (CMS/[facility])    Allergic reaction to alpha-gal       LABS: Reviewed.    Assessment:  Controlled Bipolar Disorder, unspecified  Controlled PTSD    Treatment Plan:  -The patient's current psychiatric medication regimen from home was confirmed with the patient and his wife.  -Appropriate changes were made as needed to the patients hospital regimen to assure the patient continues to receive maintenance medications while hospitalized.  -Buspirone PRN was removed from the patients medication list since it is not part of his established psychiatric regimen.    Treatment Plan discussed with: Patient and Family    I discussed the patients findings and my recommendations with patient, family and nursing staff    I have reviewed and approved the behavioral health treatment plans and problem list. Yes  Thank you for the consult  Referring MD has access to consult report and progress notes in EMR  Patient was examined wearing appropriate PPE.    Ryland Reyes  BOBBY Ames  21  23:22 EDT    EMR Dragon transcription disclaimer:  Some of this encounter note is an electronic transcription translation of spoken language to printed text. The electronic translation of spoken language may permit erroneous, or at times, nonsensical words or phrases to be inadvertently transcribed; Although I have reviewed the note for such errors some may still exist.       Electronically signed by Christy Gandara MD at 21 0871     Sarbjit Goyal MD at 21 0920              [facility] Medicine Services - Consult Note    Patient Name: Warren Giraldo  : 1980  MRN: [id number]  Primary Care Physician:  Baldev Sandy MD  Referring Physician: Baldev Sandy, *  Date of admission: 2021    Consults    Subjective      Reason for Consult/ Chief Complaint: collapse at home, assumed anaphylactic reaction    HPI taken from pulmonary note and edited:   History of Present Illness: Warren Giraldo is a 40 y.o. male who presented to [facility] ED on 2021 via EMS after collapse at home.  Patient has severe alpha gal allergy so he is allergic to any mammal product. The allergy started in 2020 from a tick-bite. The patient had chicken salad at home on the day of admission 2021. The wife believes there was some cross-contamination of mammal products in the chicken salad. She witnessed the patient collapse at home. Per wife patient was not breathing and she was unsure if she felt a pulse.  She gave patient epi and attempted to deliver breaths; however, did not feel like it was working.  She began chest compressions while on the phone with EMS. When EMS arrived the patient was found to have a pulse and was moving around although not following commands and was vomiting.  Patient was given additional Benadryl IV and Zofran in route.  Wife states that EMS attempted to intubate patient however they were  unsuccessful.   Patient was given sepsis bolus in ED and was intubated in ED for airway protection. The wife stated the patient had an anaphylactic reaction approximately two weeks ago and recovered with epi injection. He is being managed by [facility] for his alpha gal allergy; however, they are seeking a specialist more familiar to this allergy. Patient had another recent tick bit and had negative tick panel. He has been on doxycycline empirically as an outpatient.      In the ED, labs were obtained with abnormalities as follows: Glucose 110, ionized calcium 0.83, PTT 20.6.  ABG: pH 7.525, PCO2 22.6, PO2 114.6, HCO3 18.7.  UA negative for UTI.  CXR was negative for acute cardiopulmonary normalities.  CT head without contrast was negative for acute intracranial abnormality.  CT abdomen pelvis showed stomach distention with air.  Double areas of cortical hypoenhancement involving both kidneys, which could be related to hypotension, acute tubular necrosis, or polynephritis.  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     Date:   8/1: Extubated and had to be reintubated.     8/2: Self extubated.  No shortness of air. O2 being weaned. On precedex drip.     8/3: Weaned to room air. No complaints. Dietician consulted due to patient's severe mammal allergy. Psych consulted due to patient's anxiety. Pt felt stable for transfer out of ICU and hospitalist group consulted for medical management.     Review of Systems   Constitutional: Negative.   HENT: Positive for sore throat.    Eyes: Negative.    Cardiovascular: Negative.    Respiratory: Negative.    Endocrine: Negative.    Hematologic/Lymphatic: Negative.    Skin: Negative.    Musculoskeletal: Negative.    Gastrointestinal: Negative.    Genitourinary: Negative.    Neurological: Negative.    Psychiatric/Behavioral: Negative.    Allergic/Immunologic: Negative.    All other  systems reviewed and are negative.      Personal History     Past Medical History:   Diagnosis Date   • Acne varioliformis    • Acute reaction to stress    • Allergic rhinitis    • Allergy to alpha-gal    • Anxiety    • Anxiety    • Costochondritis 03/04/2013   • Depression    • Gastroesophageal reflux disease with esophagitis    • History of kidney stones    • Migraine with aura    • PUD (peptic ulcer disease)        Past Surgical History:   Procedure Laterality Date   • APPENDECTOMY     • CHOLECYSTECTOMY     • HYDROCELE EXCISION / REPAIR Left        Family History: family history includes Coronary artery disease in his father; Diabetes in his father. Otherwise pertinent FHx was reviewed and not pertinent to current issue.    Social History:  reports that he has never smoked. He has never used smokeless tobacco. He reports current alcohol use of about 14.0 standard drinks of alcohol per week. Drug use questions deferred to the physician.    Home Medications:   EPINEPHrine, LORazepam, QUEtiapine, cetirizine, doxycycline, famotidine, lamoTRIgine, multivitamin, and venlafaxine    Allergies:  Allergies   Allergen Reactions   • Bee Venom Anaphylaxis   • Beef-Derived Products Anaphylaxis     Alpha-gal   • Latex Anaphylaxis   • Other Anaphylaxis     Has Alpha gal so any mammal products   • Pork-Derived Products Anaphylaxis     Alpha-gal           Objective      Vitals:  Temp:  [97.7 °F (36.5 °C)-102.1 °F (38.9 °C)] 97.7 °F (36.5 °C)  Heart Rate:  [] 59  Resp:  [16-37] 16  BP: ()/(66-98) 95/66  Flow (L/min):  [2] 2    Physical Exam  Vitals and nursing note reviewed.   HENT:      Head: Normocephalic and atraumatic.   Eyes:      Extraocular Movements: Extraocular movements intact.      Pupils: Pupils are equal, round, and reactive to light.   Cardiovascular:      Rate and Rhythm: Normal rate and regular rhythm.      Pulses: Normal pulses.      Heart sounds: Normal heart sounds.   Pulmonary:      Effort: Pulmonary  effort is normal.      Breath sounds: Normal breath sounds.   Abdominal:      General: Bowel sounds are normal.      Palpations: Abdomen is soft.      Tenderness: There is no abdominal tenderness.   Musculoskeletal:         General: Normal range of motion.      Cervical back: Normal range of motion.   Skin:     General: Skin is warm and dry.   Neurological:      Mental Status: He is alert and oriented to person, place, and time.   Psychiatric:         Mood and Affect: Mood normal.         Behavior: Behavior normal.         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       Plan:     Acute respiratory failure requiring intubation, ?2/2 anaphylaxis from food allergy  Witnessed respiratory arrest at home  Possible aspiration pneumonia  ?Cardiac arrest  -Events at home noted, unclear if patient had cardiac arrest  -Patient intubated for airway protection in ED.   -Planned extubation 8/1 however failed after 6 hours and required reintubation  -Self extubated 8/2  -Now on room air  -Continue Benadryl and Pepcid  -Steroids weaned to prednisone  -can only take xopenex and family will get home breathing treatments if needed  -on arixtra    Severe alpha-gel anaphylactic reaction  -avoid  all mammal products  -continue prednisone   -dietician consulted to assist with food while in the hospital     Rule out sepsis without shock, secondary to possible aspiration pneumonia, Ruled Out  -Witnessed emesis prior to arrival and in ED  -IVF bolus 30 mL/kg per sepsis protocol given in ED, now off IVFs  -Lactic Acid 2.2 >2.3 >1.9 > 2.7 > 3.1 > 1.2  -Blood cultures-pending  -UA-negative  -Procalcitonin-not elevated, CRP-not elevated  -Received cefepime empirically.  Will discontinue and monitor off antibiotics     Gastroesophageal reflux disease with esophagitis  Peptic ulcer disease  -continue home pepcid     Numerous nonobstructing bilateral renal calculi  -Osorio catheter placed in ED  -Continue fluids  -Renal ultrasound reviewed, nonobstructing stone in the right kidney  -renal function normal      Anxiety  Depressive disorder  -Continue home Seroquel  -Continue home venlafaxine  -Psych consulted. Continue home lamictal if ok with psych team    This patient has been examined wearing appropriate Personal Protective Equipment 08/03/21      Signature: Electronically signed by DOLORES Graves, 08/03/21, 11:22 AM EDT.    Attending attestation:    I performed a history and physical examination of the patient. I reviewed the nurse practitioner's note and agree with the documented findings and plan of care.    S:    Patient is a 40-year-old male with history of GERD, alpha gal allergy presenting for evaluation of anaphylactic reaction.  Patient went into respiratory failure requiring intubation now extubated being transferred out of the ICU hospitalist consulted for medical management.    O:    Vital signs reviewed    General: Male lying in bed breathing comfortably on room air no acute distress  HEENT: NC/AT, EOMI, mucosa moist  Heart: Regular, rate controlled  Chest: Normal work of breathing, moving air well no wheezing  Abdominal: Soft. NT/ND.   Musculoskeletal: Normal ROM.  No edema. No calf  tenderness.  Neurological: AAOx3, no focal deficits  Skin: Skin is warm and dry. No rash  Psychiatric: Normal mood and affect.    A/P    Anaphylactic reaction-thought to be due to alpha gal reaction at this time, patient has required antihistamines epinephrine and steroids  -Airway clear at this time  -Family requesting nutrition consult to discuss dietary changes, discussed that patient may benefit from seeing an allergist on an outpatient basis  -Continue steroids    Acute respiratory failure-with hypoxia secondary to possible anaphylactic reaction, patient now extubated on room air  -Family reports patient has severe reaction to albuterol requesting only Xopenex  -Continue steroids  -Patient initially failed extubation required reintubation then self extubated  -Continues on room air  -Monitor for any signs of aspiration    Fever-transient of 102 overnight, family reports that patient has had fevers previously with albuterol and is currently refusing it cannot rule out any underlying pulmonary infection from aspiration  -CT chest on admission showing no infiltrates  -Repeat chest x-rays with intubations showing no infiltrates  -If fever returns repeat chest x-ray  -Family is reporting drug reaction  -Blood cultures no growth x2 days  -Covid negative  -If fever returns check for DVTs    Leukocytosis-mild may be secondary to steroids  -Monitor closely  -If fever returns repeat chest x-ray in cultures    Lactic acidosis-has since resolved uncertain if due to sympathetic overdrive versus intravascular depletion  -No further monitoring     Nephrolithiasis-noted incidentally on CT imaging, renal ultrasound showing nonobstructive process  -Patient asymptomatic    GERD/anxiety/depression-chronic in nature  -Resume home medication as clinically appropriate    Electronically signed by Sarbjit Goyal MD, 08/03/21, 2:06 PM EDT.      Electronically signed by Sarbjit Goyal MD at 08/03/21 0003

## 2021-08-05 ENCOUNTER — TELEPHONE (OUTPATIENT)
Dept: FAMILY MEDICINE CLINIC | Facility: CLINIC | Age: 41
End: 2021-08-05

## 2021-08-05 ENCOUNTER — TRANSITIONAL CARE MANAGEMENT TELEPHONE ENCOUNTER (OUTPATIENT)
Dept: CALL CENTER | Facility: HOSPITAL | Age: 41
End: 2021-08-05

## 2021-08-05 LAB
BACTERIA SPEC AEROBE CULT: NORMAL
BACTERIA SPEC AEROBE CULT: NORMAL

## 2021-08-05 NOTE — OUTREACH NOTE
Call Center TCM Note      Responses   [facility] patient discharged from?  [facility]   Does the patient have one of the following disease processes/diagnoses(primary or secondary)?  Other   TCM attempt successful?  Yes   Call start time  1539   Call end time  1542   Discharge diagnosis  Resp failure requiring reintubation   Meds reviewed with patient/caregiver?  Yes   Is the patient having any side effects they believe may be caused by any medication additions or changes?  No   Does the patient have all medications ordered at discharge?  N/A   Is the patient taking all medications as directed (includes completed medication regime)?  Yes   Does the patient have a primary care provider?   Yes   Does the patient have an appointment with their PCP within 7 days of discharge?  No   What is preventing the patient from scheduling follow up appointments within 7 days of discharge?  Earlier appointment not available   Nursing Interventions  Educated patient on importance of making appointment   Psychosocial issues?  No   Did the patient receive a copy of their discharge instructions?  Yes   Nursing interventions  Reviewed instructions with patient   What is the patient's perception of their health status since discharge?  Same   Is the patient/caregiver able to teach back signs and symptoms related to disease process for when to call PCP?  Yes   Is the patient/caregiver able to teach back signs and symptoms related to disease process for when to call 911?  Yes   Is the patient/caregiver able to teach back the hierarchy of who to call/visit for symptoms/problems? PCP, Specialist, Home health nurse, Urgent Care, ED, 911  Yes   If the patient is a current smoker, are they able to teach back resources for cessation?  Not a smoker   TCM call completed?  Yes          Hedy Holden RN    8/5/2021, 15:43 EDT

## 2021-08-05 NOTE — TELEPHONE ENCOUNTER
Patient called to get a hospital follow up from  His recent trip to [facility]. They are extremely worried about his breathing/throat. I offered the first available 8/13 and they said they would prefer not to wait because wife was unsure how to care for him

## 2021-08-05 NOTE — TELEPHONE ENCOUNTER
Same day appt if available ok. Urgent care if he gets worse. Did they have discharge instructions? Home health?

## 2021-08-06 ENCOUNTER — OFFICE VISIT (OUTPATIENT)
Dept: FAMILY MEDICINE CLINIC | Facility: CLINIC | Age: 41
End: 2021-08-06

## 2021-08-06 VITALS
TEMPERATURE: 98 F | RESPIRATION RATE: 18 BRPM | DIASTOLIC BLOOD PRESSURE: 64 MMHG | HEART RATE: 130 BPM | BODY MASS INDEX: 22.19 KG/M2 | SYSTOLIC BLOOD PRESSURE: 96 MMHG | OXYGEN SATURATION: 97 % | WEIGHT: 163.6 LBS

## 2021-08-06 DIAGNOSIS — F43.0 ACUTE STRESS DISORDER: ICD-10-CM

## 2021-08-06 DIAGNOSIS — T78.1XXA ALLERGIC REACTION TO ALPHA-GAL: Primary | ICD-10-CM

## 2021-08-06 PROBLEM — A41.9 SEPSIS WITHOUT SEPTIC SHOCK: Status: RESOLVED | Noted: 2021-08-01 | Resolved: 2021-08-06

## 2021-08-06 PROBLEM — R09.2 RESPIRATORY ARREST: Status: RESOLVED | Noted: 2021-08-01 | Resolved: 2021-08-06

## 2021-08-06 PROCEDURE — 99213 OFFICE O/P EST LOW 20 MIN: CPT | Performed by: FAMILY MEDICINE

## 2021-08-06 NOTE — PROGRESS NOTES
Subjective   Warren Giraldo is a 40 y.o. male.   Chief Complaint   Patient presents with   • Hospital Follow Up Visit       Patient was seen at [facility] on 7/31/2021 via EMS due to SOA and an Alpha-gal reaction.  Needs note for days missed.   Being followed by allergist as well        The following portions of the patient's history were reviewed and updated as appropriate: allergies, current medications, past family history, past medical history, past social history, past surgical history and problem list.    Patient Active Problem List   Diagnosis   • Acne varioliformis   • Acute stress disorder   • Allergic rhinitis   • Anxiety   • Classical migraine   • Costochondritis   • Gastroesophageal reflux disease with esophagitis   • PUD (peptic ulcer disease)   • Anaphylaxis due to food   • Aspiration pneumonia (CMS/[facility])   • Allergic reaction to alpha-gal   • Bipolar affective disorder, depressed (CMS/HCC)       Current Outpatient Medications on File Prior to Visit   Medication Sig Dispense Refill   • cetirizine (zyrTEC) 10 MG tablet Take 10 mg by mouth Daily.     • doxycycline (MONODOX) 100 MG capsule Take 1 capsule by mouth 2 (Two) Times a Day for 14 days. 28 capsule 0   • EPINEPHrine (EPIPEN) 0.3 MG/0.3ML solution auto-injector injection Inject 0.3 mg into the appropriate muscle as directed by prescriber.     • famotidine (PEPCID) 20 MG tablet Take 40 mg by mouth 2 (Two) Times a Day.     • lamoTRIgine (LaMICtal) 200 MG tablet Take 200 mg by mouth Every Night.     • levalbuterol (XOPENEX HFA) 45 MCG/ACT inhaler Inhale 1-2 puffs Every 4 (Four) Hours As Needed for Wheezing. 1 each 0   • LORazepam (ATIVAN) 1 MG tablet Take 1 mg by mouth 2 (Two) Times a Day As Needed.  2   • multivitamin (ONE-A-DAY MENS PO) Take 1 tablet by mouth Daily.     • predniSONE (DELTASONE) 10 MG tablet Take 3 tablets by mouth Daily for 3 days, THEN 2 tablets Daily for 3 days, THEN 1 tablet Daily for 3 days. 18 tablet 0   • QUEtiapine (SEROquel) 100  MG tablet Take 100 mg by mouth Daily. Takes 100 mg every morning     • venlafaxine 225 MG tablet sustained-release 24 hour 24 hr tablet Take 225 mg by mouth Daily With Breakfast.     • QUEtiapine (SEROquel) 100 MG tablet Take 400 mg by mouth Every Night. 400 mg nightly       No current facility-administered medications on file prior to visit.     Current outpatient and discharge medications have been reconciled for the patient.  Reviewed by: Baldev Sandy MD      Allergies   Allergen Reactions   • Bee Venom Anaphylaxis   • Beef-Derived Products Anaphylaxis     Alpha-gal   • Latex Anaphylaxis   • Milk Protein Anaphylaxis     Alpha-Gal   • Other Anaphylaxis     Has Alpha gal so any mammal products   • Pork-Derived Products Anaphylaxis     Alpha-gal         Review of Systems   Constitutional: Negative for activity change, appetite change, fatigue and fever.   HENT: Negative for ear pain, swollen glands and voice change.    Eyes: Negative for visual disturbance.   Respiratory: Negative for shortness of breath and wheezing.    Cardiovascular: Negative for chest pain and leg swelling.   Gastrointestinal: Negative for abdominal pain, blood in stool, constipation, diarrhea, nausea and vomiting.   Endocrine: Negative for polydipsia and polyuria.   Genitourinary: Negative for dysuria, frequency and hematuria.   Musculoskeletal: Negative for joint swelling, neck pain and neck stiffness.   Skin: Negative for rash and bruise.   Neurological: Negative for weakness, numbness and headache.   Psychiatric/Behavioral: Negative for suicidal ideas and depressed mood.     I have reviewed and confirmed the accuracy of the ROS as documented by the MA/LPN/RN Baldev Sandy MD    Objective   Visit Vitals  BP 96/64 (BP Location: Right arm, Patient Position: Sitting, Cuff Size: Adult)   Pulse (!) 130   Temp 98 °F (36.7 °C)   Resp 18   Wt 74.2 kg (163 lb 9.6 oz)   SpO2 97%   BMI 22.19 kg/m²       Physical Exam  Constitutional:        Appearance: He is well-developed.   HENT:      Head: Normocephalic and atraumatic.      Right Ear: External ear normal.      Left Ear: External ear normal.      Nose: Nose normal.   Eyes:      Pupils: Pupils are equal, round, and reactive to light.   Cardiovascular:      Rate and Rhythm: Normal rate and regular rhythm.      Heart sounds: Normal heart sounds.   Pulmonary:      Effort: Pulmonary effort is normal.      Breath sounds: Normal breath sounds.   Abdominal:      General: Bowel sounds are normal.      Palpations: Abdomen is soft.   Musculoskeletal:         General: Normal range of motion.      Cervical back: Normal range of motion and neck supple.   Skin:     General: Skin is warm and dry.   Neurological:      Mental Status: He is alert and oriented to person, place, and time.   Psychiatric:         Behavior: Behavior normal.         Thought Content: Thought content normal.         Judgment: Judgment normal.         Assessment/Plan .      Diagnoses and all orders for this visit:    1. Allergic reaction to alpha-gal (Primary)  Overview:  severe    Assessment & Plan:  Continue current javier, keep allergist follow up       2. Acute stress disorder   Findings discussed. All questions answered.  Medication and medication adverse effects discussed.  Drug education given and explained to patient. Patient verbalized understanding.  Follow-up for routine health maintenance as directed         I wore protective equipment throughout this patient encounter to include mask. Hand hygiene was performed before donning protective equipment and after removal when leaving the room
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
## 2021-08-11 PROBLEM — R06.02 SHORTNESS OF BREATH: Status: ACTIVE | Noted: 2021-08-11

## 2021-08-12 NOTE — ASSESSMENT & PLAN NOTE
Severe.  Patient was treated with doxycycline to help treat potential tickborne illness or worsening of alpha gal.

## 2021-08-12 NOTE — ASSESSMENT & PLAN NOTE
Tick bite panel obtained.  Patient was started on doxycycline to help treat symptoms of possible tickborne illness.  This is based on patient's allergic reaction to alpha gal.

## 2021-08-13 ENCOUNTER — READMISSION MANAGEMENT (OUTPATIENT)
Dept: CALL CENTER | Facility: HOSPITAL | Age: 41
End: 2021-08-13

## 2021-08-13 NOTE — OUTREACH NOTE
Medical Week 2 Survey      Responses   [facility] patient discharged from?  [facility]   Does the patient have one of the following disease processes/diagnoses(primary or secondary)?  Other   Week 2 attempt successful?  No   Unsuccessful attempts  Attempt 1          Laila Bird RN

## 2021-08-18 ENCOUNTER — APPOINTMENT (OUTPATIENT)
Dept: CT IMAGING | Facility: HOSPITAL | Age: 41
End: 2021-08-18

## 2021-08-18 ENCOUNTER — HOSPITAL ENCOUNTER (OUTPATIENT)
Facility: HOSPITAL | Age: 41
Setting detail: OBSERVATION
Discharge: HOME | End: 2021-08-19
Attending: EMERGENCY MEDICINE | Admitting: EMERGENCY MEDICINE

## 2021-08-18 DIAGNOSIS — F10.920 ALCOHOLIC INTOXICATION WITHOUT COMPLICATION (HCC): ICD-10-CM

## 2021-08-18 DIAGNOSIS — T78.40XA ALLERGIC REACTION, INITIAL ENCOUNTER: Primary | ICD-10-CM

## 2021-08-18 DIAGNOSIS — Z91.018 ALLERGY TO ALPHA-GAL: ICD-10-CM

## 2021-08-18 DIAGNOSIS — R41.82 ALTERED MENTAL STATUS, UNSPECIFIED ALTERED MENTAL STATUS TYPE: ICD-10-CM

## 2021-08-18 LAB
ALBUMIN SERPL-MCNC: 4.5 G/DL (ref 3.5–5.2)
ALBUMIN/GLOB SERPL: 2.3 G/DL
ALP SERPL-CCNC: 43 U/L (ref 39–117)
ALT SERPL W P-5'-P-CCNC: 21 U/L (ref 1–41)
AMPHET+METHAMPHET UR QL: NEGATIVE
ANION GAP SERPL CALCULATED.3IONS-SCNC: 16 MMOL/L (ref 5–15)
AST SERPL-CCNC: 18 U/L (ref 1–40)
BARBITURATES UR QL SCN: NEGATIVE
BASOPHILS # BLD AUTO: 0.1 10*3/MM3 (ref 0–0.2)
BASOPHILS NFR BLD AUTO: 0.6 % (ref 0–1.5)
BENZODIAZ UR QL SCN: NEGATIVE
BILIRUB SERPL-MCNC: 0.4 MG/DL (ref 0–1.2)
BILIRUB UR QL STRIP: NEGATIVE
BUN SERPL-MCNC: 18 MG/DL (ref 6–20)
BUN/CREAT SERPL: 14.2 (ref 7–25)
CALCIUM SPEC-SCNC: 8.6 MG/DL (ref 8.6–10.5)
CANNABINOIDS SERPL QL: NEGATIVE
CHLORIDE SERPL-SCNC: 105 MMOL/L (ref 98–107)
CK SERPL-CCNC: 60 U/L (ref 20–200)
CLARITY UR: CLEAR
CO2 SERPL-SCNC: 20 MMOL/L (ref 22–29)
COCAINE UR QL: NEGATIVE
COLOR UR: YELLOW
CREAT SERPL-MCNC: 1.27 MG/DL (ref 0.76–1.27)
DEPRECATED RDW RBC AUTO: 43.8 FL (ref 37–54)
EOSINOPHIL # BLD AUTO: 0.1 10*3/MM3 (ref 0–0.4)
EOSINOPHIL NFR BLD AUTO: 1 % (ref 0.3–6.2)
ERYTHROCYTE [DISTWIDTH] IN BLOOD BY AUTOMATED COUNT: 13.1 % (ref 12.3–15.4)
ETHANOL UR QL: 0.1 %
GFR SERPL CREATININE-BSD FRML MDRD: 63 ML/MIN/1.73
GFR SERPL CREATININE-BSD FRML MDRD: 77 ML/MIN/1.73
GLOBULIN UR ELPH-MCNC: 2 GM/DL
GLUCOSE BLDC GLUCOMTR-MCNC: 154 MG/DL (ref 70–105)
GLUCOSE SERPL-MCNC: 133 MG/DL (ref 65–99)
GLUCOSE UR STRIP-MCNC: NEGATIVE MG/DL
HCT VFR BLD AUTO: 41.7 % (ref 37.5–51)
HGB BLD-MCNC: 14.5 G/DL (ref 13–17.7)
HGB UR QL STRIP.AUTO: NEGATIVE
HOLD SPECIMEN: NORMAL
HOLD SPECIMEN: NORMAL
KETONES UR QL STRIP: NEGATIVE
LEUKOCYTE ESTERASE UR QL STRIP.AUTO: NEGATIVE
LYMPHOCYTES # BLD AUTO: 2.7 10*3/MM3 (ref 0.7–3.1)
LYMPHOCYTES NFR BLD AUTO: 25.2 % (ref 19.6–45.3)
MAGNESIUM SERPL-MCNC: 2.2 MG/DL (ref 1.6–2.6)
MCH RBC QN AUTO: 33.4 PG (ref 26.6–33)
MCHC RBC AUTO-ENTMCNC: 34.7 G/DL (ref 31.5–35.7)
MCV RBC AUTO: 96.4 FL (ref 79–97)
METHADONE UR QL SCN: NEGATIVE
MONOCYTES # BLD AUTO: 0.9 10*3/MM3 (ref 0.1–0.9)
MONOCYTES NFR BLD AUTO: 8.7 % (ref 5–12)
NEUTROPHILS NFR BLD AUTO: 6.9 10*3/MM3 (ref 1.7–7)
NEUTROPHILS NFR BLD AUTO: 64.5 % (ref 42.7–76)
NITRITE UR QL STRIP: NEGATIVE
NRBC BLD AUTO-RTO: 0.1 /100 WBC (ref 0–0.2)
OPIATES UR QL: NEGATIVE
OXYCODONE UR QL SCN: NEGATIVE
PH UR STRIP.AUTO: 6 [PH] (ref 5–8)
PHOSPHATE SERPL-MCNC: 2.6 MG/DL (ref 2.5–4.5)
PLATELET # BLD AUTO: 314 10*3/MM3 (ref 140–450)
PMV BLD AUTO: 8.2 FL (ref 6–12)
POTASSIUM SERPL-SCNC: 3.5 MMOL/L (ref 3.5–5.2)
PROT SERPL-MCNC: 6.5 G/DL (ref 6–8.5)
PROT UR QL STRIP: NEGATIVE
RBC # BLD AUTO: 4.33 10*6/MM3 (ref 4.14–5.8)
SODIUM SERPL-SCNC: 141 MMOL/L (ref 136–145)
SP GR UR STRIP: <=1.005 (ref 1–1.03)
TROPONIN T SERPL-MCNC: <0.01 NG/ML (ref 0–0.03)
UROBILINOGEN UR QL STRIP: NORMAL
WBC # BLD AUTO: 10.7 10*3/MM3 (ref 3.4–10.8)

## 2021-08-18 PROCEDURE — 80307 DRUG TEST PRSMV CHEM ANLYZR: CPT | Performed by: EMERGENCY MEDICINE

## 2021-08-18 PROCEDURE — 70450 CT HEAD/BRAIN W/O DYE: CPT

## 2021-08-18 PROCEDURE — 82550 ASSAY OF CK (CPK): CPT | Performed by: EMERGENCY MEDICINE

## 2021-08-18 PROCEDURE — 25010000002 ONDANSETRON PER 1 MG

## 2021-08-18 PROCEDURE — 82077 ASSAY SPEC XCP UR&BREATH IA: CPT | Performed by: EMERGENCY MEDICINE

## 2021-08-18 PROCEDURE — 84100 ASSAY OF PHOSPHORUS: CPT | Performed by: EMERGENCY MEDICINE

## 2021-08-18 PROCEDURE — 80053 COMPREHEN METABOLIC PANEL: CPT | Performed by: EMERGENCY MEDICINE

## 2021-08-18 PROCEDURE — 99285 EMERGENCY DEPT VISIT HI MDM: CPT

## 2021-08-18 PROCEDURE — 84484 ASSAY OF TROPONIN QUANT: CPT | Performed by: EMERGENCY MEDICINE

## 2021-08-18 PROCEDURE — 82962 GLUCOSE BLOOD TEST: CPT

## 2021-08-18 PROCEDURE — 83735 ASSAY OF MAGNESIUM: CPT | Performed by: EMERGENCY MEDICINE

## 2021-08-18 PROCEDURE — 81003 URINALYSIS AUTO W/O SCOPE: CPT | Performed by: EMERGENCY MEDICINE

## 2021-08-18 PROCEDURE — 85025 COMPLETE CBC W/AUTO DIFF WBC: CPT | Performed by: EMERGENCY MEDICINE

## 2021-08-18 PROCEDURE — 96375 TX/PRO/DX INJ NEW DRUG ADDON: CPT

## 2021-08-18 PROCEDURE — P9612 CATHETERIZE FOR URINE SPEC: HCPCS

## 2021-08-18 RX ORDER — SODIUM CHLORIDE 0.9 % (FLUSH) 0.9 %
10 SYRINGE (ML) INJECTION AS NEEDED
Status: DISCONTINUED | OUTPATIENT
Start: 2021-08-18 | End: 2021-08-19 | Stop reason: HOSPADM

## 2021-08-18 RX ORDER — ONDANSETRON 2 MG/ML
INJECTION INTRAMUSCULAR; INTRAVENOUS
Status: COMPLETED
Start: 2021-08-18 | End: 2021-08-18

## 2021-08-18 RX ORDER — FAMOTIDINE 10 MG/ML
20 INJECTION, SOLUTION INTRAVENOUS ONCE
Status: COMPLETED | OUTPATIENT
Start: 2021-08-18 | End: 2021-08-18

## 2021-08-18 RX ORDER — NALOXONE HYDROCHLORIDE 1 MG/ML
2 INJECTION INTRAMUSCULAR; INTRAVENOUS; SUBCUTANEOUS ONCE
Status: COMPLETED | OUTPATIENT
Start: 2021-08-18 | End: 2021-08-18

## 2021-08-18 RX ORDER — ONDANSETRON 2 MG/ML
8 INJECTION INTRAMUSCULAR; INTRAVENOUS ONCE
Status: COMPLETED | OUTPATIENT
Start: 2021-08-18 | End: 2021-08-18

## 2021-08-18 RX ADMIN — SODIUM CHLORIDE 1000 ML: 9 INJECTION, SOLUTION INTRAVENOUS at 20:20

## 2021-08-18 RX ADMIN — ONDANSETRON 8 MG: 2 INJECTION INTRAMUSCULAR; INTRAVENOUS at 20:20

## 2021-08-18 RX ADMIN — FAMOTIDINE 20 MG: 10 INJECTION INTRAVENOUS at 20:26

## 2021-08-18 RX ADMIN — NALOXONE HYDROCHLORIDE 2 MG: 1 INJECTION PARENTERAL at 20:14

## 2021-08-19 ENCOUNTER — TELEPHONE (OUTPATIENT)
Dept: FAMILY MEDICINE CLINIC | Facility: CLINIC | Age: 41
End: 2021-08-19

## 2021-08-19 ENCOUNTER — READMISSION MANAGEMENT (OUTPATIENT)
Dept: CALL CENTER | Facility: HOSPITAL | Age: 41
End: 2021-08-19

## 2021-08-19 ENCOUNTER — APPOINTMENT (OUTPATIENT)
Dept: RESPIRATORY THERAPY | Facility: HOSPITAL | Age: 41
End: 2021-08-19

## 2021-08-19 VITALS
TEMPERATURE: 98.3 F | DIASTOLIC BLOOD PRESSURE: 78 MMHG | SYSTOLIC BLOOD PRESSURE: 134 MMHG | BODY MASS INDEX: 22.34 KG/M2 | OXYGEN SATURATION: 97 % | HEART RATE: 110 BPM | HEIGHT: 72 IN | RESPIRATION RATE: 16 BRPM | WEIGHT: 164.9 LBS

## 2021-08-19 PROBLEM — T78.40XA ALLERGIC REACTION: Status: RESOLVED | Noted: 2021-08-19 | Resolved: 2021-08-19

## 2021-08-19 PROBLEM — Z91.018 ALLERGY TO ALPHA-GAL: Status: RESOLVED | Noted: 2021-08-19 | Resolved: 2021-08-19

## 2021-08-19 PROBLEM — T78.40XA ALLERGIC REACTION: Status: ACTIVE | Noted: 2021-08-19

## 2021-08-19 PROBLEM — F10.920 ALCOHOLIC INTOXICATION WITHOUT COMPLICATION (HCC): Status: RESOLVED | Noted: 2021-08-19 | Resolved: 2021-08-19

## 2021-08-19 PROBLEM — R41.82 ALTERED MENTAL STATUS: Status: RESOLVED | Noted: 2021-08-19 | Resolved: 2021-08-19

## 2021-08-19 PROBLEM — R41.82 ALTERED MENTAL STATUS: Status: ACTIVE | Noted: 2021-08-19

## 2021-08-19 PROBLEM — Z91.018 ALLERGY TO ALPHA-GAL: Status: ACTIVE | Noted: 2021-08-19

## 2021-08-19 PROBLEM — F10.920 ALCOHOLIC INTOXICATION WITHOUT COMPLICATION: Status: ACTIVE | Noted: 2021-08-19

## 2021-08-19 LAB
ANION GAP SERPL CALCULATED.3IONS-SCNC: 13 MMOL/L (ref 5–15)
BASOPHILS # BLD AUTO: 0 10*3/MM3 (ref 0–0.2)
BASOPHILS NFR BLD AUTO: 0.6 % (ref 0–1.5)
BUN SERPL-MCNC: 18 MG/DL (ref 6–20)
BUN/CREAT SERPL: 15 (ref 7–25)
CALCIUM SPEC-SCNC: 9.3 MG/DL (ref 8.6–10.5)
CHLORIDE SERPL-SCNC: 104 MMOL/L (ref 98–107)
CO2 SERPL-SCNC: 21 MMOL/L (ref 22–29)
CREAT SERPL-MCNC: 1.2 MG/DL (ref 0.76–1.27)
DEPRECATED RDW RBC AUTO: 42.9 FL (ref 37–54)
EOSINOPHIL # BLD AUTO: 0 10*3/MM3 (ref 0–0.4)
EOSINOPHIL NFR BLD AUTO: 0 % (ref 0.3–6.2)
ERYTHROCYTE [DISTWIDTH] IN BLOOD BY AUTOMATED COUNT: 12.9 % (ref 12.3–15.4)
GFR SERPL CREATININE-BSD FRML MDRD: 68 ML/MIN/1.73
GLUCOSE SERPL-MCNC: 142 MG/DL (ref 65–99)
HCT VFR BLD AUTO: 43.1 % (ref 37.5–51)
HGB BLD-MCNC: 14.5 G/DL (ref 13–17.7)
LYMPHOCYTES # BLD AUTO: 0.5 10*3/MM3 (ref 0.7–3.1)
LYMPHOCYTES NFR BLD AUTO: 10.4 % (ref 19.6–45.3)
MCH RBC QN AUTO: 32.2 PG (ref 26.6–33)
MCHC RBC AUTO-ENTMCNC: 33.5 G/DL (ref 31.5–35.7)
MCV RBC AUTO: 96 FL (ref 79–97)
MONOCYTES # BLD AUTO: 0.1 10*3/MM3 (ref 0.1–0.9)
MONOCYTES NFR BLD AUTO: 1.3 % (ref 5–12)
NEUTROPHILS NFR BLD AUTO: 4.3 10*3/MM3 (ref 1.7–7)
NEUTROPHILS NFR BLD AUTO: 87.7 % (ref 42.7–76)
NRBC BLD AUTO-RTO: 0.1 /100 WBC (ref 0–0.2)
PLATELET # BLD AUTO: 255 10*3/MM3 (ref 140–450)
PMV BLD AUTO: 8.4 FL (ref 6–12)
POTASSIUM SERPL-SCNC: 4.6 MMOL/L (ref 3.5–5.2)
QT INTERVAL: 341 MS
RBC # BLD AUTO: 4.49 10*6/MM3 (ref 4.14–5.8)
SARS-COV-2 RNA PNL SPEC NAA+PROBE: NOT DETECTED
SODIUM SERPL-SCNC: 138 MMOL/L (ref 136–145)
WBC # BLD AUTO: 4.9 10*3/MM3 (ref 3.4–10.8)

## 2021-08-19 PROCEDURE — 93005 ELECTROCARDIOGRAM TRACING: CPT | Performed by: PHYSICIAN ASSISTANT

## 2021-08-19 PROCEDURE — 96375 TX/PRO/DX INJ NEW DRUG ADDON: CPT

## 2021-08-19 PROCEDURE — 86622 BRUCELLA ANTIBODY: CPT | Performed by: EMERGENCY MEDICINE

## 2021-08-19 PROCEDURE — 25010000002 METHYLPREDNISOLONE PER 125 MG: Performed by: EMERGENCY MEDICINE

## 2021-08-19 PROCEDURE — 25010000002 POTASSIUM CHLORIDE PER 2 MEQ OF POTASSIUM: Performed by: EMERGENCY MEDICINE

## 2021-08-19 PROCEDURE — 93010 ELECTROCARDIOGRAM REPORT: CPT | Performed by: INTERNAL MEDICINE

## 2021-08-19 PROCEDURE — 86757 RICKETTSIA ANTIBODY: CPT | Performed by: EMERGENCY MEDICINE

## 2021-08-19 PROCEDURE — 96365 THER/PROPH/DIAG IV INF INIT: CPT

## 2021-08-19 PROCEDURE — 80048 BASIC METABOLIC PNL TOTAL CA: CPT | Performed by: EMERGENCY MEDICINE

## 2021-08-19 PROCEDURE — G0378 HOSPITAL OBSERVATION PER HR: HCPCS

## 2021-08-19 PROCEDURE — 96366 THER/PROPH/DIAG IV INF ADDON: CPT

## 2021-08-19 PROCEDURE — 86618 LYME DISEASE ANTIBODY: CPT | Performed by: EMERGENCY MEDICINE

## 2021-08-19 PROCEDURE — 85025 COMPLETE CBC W/AUTO DIFF WBC: CPT | Performed by: EMERGENCY MEDICINE

## 2021-08-19 PROCEDURE — 25010000002 THIAMINE PER 100 MG: Performed by: EMERGENCY MEDICINE

## 2021-08-19 PROCEDURE — 93225 XTRNL ECG REC<48 HRS REC: CPT

## 2021-08-19 PROCEDURE — 86666 EHRLICHIA ANTIBODY: CPT | Performed by: EMERGENCY MEDICINE

## 2021-08-19 PROCEDURE — C9803 HOPD COVID-19 SPEC COLLECT: HCPCS

## 2021-08-19 PROCEDURE — 87635 SARS-COV-2 COVID-19 AMP PRB: CPT | Performed by: EMERGENCY MEDICINE

## 2021-08-19 RX ORDER — LORAZEPAM 1 MG/1
1 TABLET ORAL 2 TIMES DAILY PRN
Status: DISCONTINUED | OUTPATIENT
Start: 2021-08-19 | End: 2021-08-19

## 2021-08-19 RX ORDER — BUTALBITAL, ACETAMINOPHEN AND CAFFEINE 50; 325; 40 MG/1; MG/1; MG/1
1 TABLET ORAL ONCE
Status: COMPLETED | OUTPATIENT
Start: 2021-08-19 | End: 2021-08-19

## 2021-08-19 RX ORDER — MELATONIN
1000 DAILY
COMMUNITY

## 2021-08-19 RX ORDER — CETIRIZINE HYDROCHLORIDE 10 MG/1
10 TABLET ORAL DAILY
COMMUNITY

## 2021-08-19 RX ORDER — LORAZEPAM 1 MG/1
1 TABLET ORAL 2 TIMES DAILY PRN
COMMUNITY

## 2021-08-19 RX ORDER — ACETAMINOPHEN 325 MG/1
650 TABLET ORAL EVERY 4 HOURS PRN
Status: DISCONTINUED | OUTPATIENT
Start: 2021-08-19 | End: 2021-08-19 | Stop reason: HOSPADM

## 2021-08-19 RX ORDER — LAMOTRIGINE 100 MG/1
200 TABLET ORAL EVERY MORNING
Status: DISCONTINUED | OUTPATIENT
Start: 2021-08-19 | End: 2021-08-19 | Stop reason: HOSPADM

## 2021-08-19 RX ORDER — QUETIAPINE FUMARATE 100 MG/1
400 TABLET, FILM COATED ORAL NIGHTLY
Status: DISCONTINUED | OUTPATIENT
Start: 2021-08-19 | End: 2021-08-19 | Stop reason: HOSPADM

## 2021-08-19 RX ORDER — QUETIAPINE FUMARATE 100 MG/1
200 TABLET, FILM COATED ORAL
COMMUNITY

## 2021-08-19 RX ORDER — CHOLECALCIFEROL (VITAMIN D3) 125 MCG
5 CAPSULE ORAL NIGHTLY PRN
Status: DISCONTINUED | OUTPATIENT
Start: 2021-08-19 | End: 2021-08-19 | Stop reason: HOSPADM

## 2021-08-19 RX ORDER — MULTIPLE VITAMINS W/ MINERALS TAB 9MG-400MCG
1 TAB ORAL DAILY
COMMUNITY

## 2021-08-19 RX ORDER — ONDANSETRON 2 MG/ML
4 INJECTION INTRAMUSCULAR; INTRAVENOUS EVERY 6 HOURS PRN
Status: DISCONTINUED | OUTPATIENT
Start: 2021-08-19 | End: 2021-08-19 | Stop reason: HOSPADM

## 2021-08-19 RX ORDER — QUETIAPINE FUMARATE 400 MG/1
400 TABLET, FILM COATED ORAL NIGHTLY
COMMUNITY

## 2021-08-19 RX ORDER — FAMOTIDINE 40 MG/1
40 TABLET, FILM COATED ORAL 2 TIMES DAILY
COMMUNITY

## 2021-08-19 RX ORDER — CETIRIZINE HYDROCHLORIDE 10 MG/1
10 TABLET ORAL DAILY
Status: DISCONTINUED | OUTPATIENT
Start: 2021-08-19 | End: 2021-08-19 | Stop reason: HOSPADM

## 2021-08-19 RX ORDER — LORAZEPAM 1 MG/1
1 TABLET ORAL 2 TIMES DAILY PRN
Status: DISCONTINUED | OUTPATIENT
Start: 2021-08-19 | End: 2021-08-19 | Stop reason: HOSPADM

## 2021-08-19 RX ORDER — METHYLPREDNISOLONE SODIUM SUCCINATE 125 MG/2ML
80 INJECTION, POWDER, LYOPHILIZED, FOR SOLUTION INTRAMUSCULAR; INTRAVENOUS EVERY 8 HOURS
Status: DISCONTINUED | OUTPATIENT
Start: 2021-08-19 | End: 2021-08-19 | Stop reason: HOSPADM

## 2021-08-19 RX ORDER — LAMOTRIGINE 200 MG/1
200 TABLET ORAL EVERY MORNING
COMMUNITY
End: 2022-12-28

## 2021-08-19 RX ORDER — EPINEPHRINE 0.3 MG/.3ML
0.3 INJECTION SUBCUTANEOUS AS NEEDED
COMMUNITY
End: 2022-02-07

## 2021-08-19 RX ORDER — DIPHENHYDRAMINE HYDROCHLORIDE 50 MG/ML
50 INJECTION INTRAMUSCULAR; INTRAVENOUS EVERY 6 HOURS PRN
Status: DISCONTINUED | OUTPATIENT
Start: 2021-08-19 | End: 2021-08-19 | Stop reason: HOSPADM

## 2021-08-19 RX ORDER — VENLAFAXINE HYDROCHLORIDE 225 MG/1
225 TABLET, EXTENDED RELEASE ORAL
COMMUNITY

## 2021-08-19 RX ORDER — QUETIAPINE FUMARATE 100 MG/1
100 TABLET, FILM COATED ORAL
Status: DISCONTINUED | OUTPATIENT
Start: 2021-08-19 | End: 2021-08-19 | Stop reason: HOSPADM

## 2021-08-19 RX ORDER — FAMOTIDINE 20 MG/1
40 TABLET, FILM COATED ORAL
Status: DISCONTINUED | OUTPATIENT
Start: 2021-08-19 | End: 2021-08-19 | Stop reason: HOSPADM

## 2021-08-19 RX ORDER — EPINEPHRINE 0.3 MG/.3ML
0.3 INJECTION SUBCUTANEOUS ONCE
Qty: 1 EACH | Refills: 0 | Status: SHIPPED | OUTPATIENT
Start: 2021-08-19 | End: 2021-08-19

## 2021-08-19 RX ORDER — QUETIAPINE FUMARATE 100 MG/1
400 TABLET, FILM COATED ORAL NIGHTLY
Status: DISCONTINUED | OUTPATIENT
Start: 2021-08-19 | End: 2021-08-19

## 2021-08-19 RX ORDER — SODIUM CHLORIDE 0.9 % (FLUSH) 0.9 %
10 SYRINGE (ML) INJECTION AS NEEDED
Status: DISCONTINUED | OUTPATIENT
Start: 2021-08-19 | End: 2021-08-19 | Stop reason: HOSPADM

## 2021-08-19 RX ORDER — SODIUM CHLORIDE 0.9 % (FLUSH) 0.9 %
10 SYRINGE (ML) INJECTION EVERY 12 HOURS SCHEDULED
Status: DISCONTINUED | OUTPATIENT
Start: 2021-08-19 | End: 2021-08-19 | Stop reason: HOSPADM

## 2021-08-19 RX ADMIN — QUETIAPINE FUMARATE 400 MG: 100 TABLET ORAL at 03:48

## 2021-08-19 RX ADMIN — CETIRIZINE HYDROCHLORIDE 10 MG: 10 TABLET, FILM COATED ORAL at 10:19

## 2021-08-19 RX ADMIN — ACETAMINOPHEN 650 MG: 325 TABLET, FILM COATED ORAL at 03:15

## 2021-08-19 RX ADMIN — LAMOTRIGINE 200 MG: 100 TABLET ORAL at 10:19

## 2021-08-19 RX ADMIN — BUTALBITAL, ACETAMINOPHEN AND CAFFEINE 1 TABLET: 50; 325; 40 TABLET ORAL at 03:34

## 2021-08-19 RX ADMIN — METHYLPREDNISOLONE SODIUM SUCCINATE 80 MG: 125 INJECTION, POWDER, FOR SOLUTION INTRAMUSCULAR; INTRAVENOUS at 03:35

## 2021-08-19 RX ADMIN — QUETIAPINE FUMARATE 100 MG: 100 TABLET ORAL at 10:19

## 2021-08-19 RX ADMIN — THIAMINE HYDROCHLORIDE 125 ML/HR: 100 INJECTION, SOLUTION INTRAMUSCULAR; INTRAVENOUS at 03:16

## 2021-08-19 NOTE — OUTREACH NOTE
Prep Survey      Responses   [facility] patient discharged from?  [facility]   Is LACE score < 7 ?  Yes   Emergency Room discharge w/ pulse ox?  No   Eligibility  [facility]   Date of Admission  08/18/21   Date of Discharge  08/19/21   Discharge Disposition  Home or Self Care   Discharge diagnosis  Altered mental statusPossible allergic reaction   Does the patient have one of the following disease processes/diagnoses(primary or secondary)?  Other   Does the patient have Home health ordered?  No   Is there a DME ordered?  No   Prep survey completed?  Yes          Halima Lehman RN

## 2021-08-19 NOTE — NURSING NOTE
Called lab about 3 labs that still need to be collected. Febril Antibody Profile, E. Chaffeenis HME Monocytic, Human Granulocytic Ehrilic HGE .

## 2021-08-19 NOTE — TELEPHONE ENCOUNTER
Caller: Warren Giraldo    Relationship to patient: Self    Best call back number: [phone]    Patient is needing: PATIENT NEEDS A LETTER STATING THAT HE CAN TAKE FOOD THAT HE IS NOT ALLERGIC TO ON A PLANE. HE NEEDS IT BEFORE Sunday. HE IS ALLERGIC TO RED MEAT. PLEASE CONTACT HIM WITH ANY QUESTIONS.

## 2021-08-19 NOTE — PLAN OF CARE
Goal Outcome Evaluation:  Plan of Care Reviewed With: patient        Progress: no change  Outcome Summary: Pt is here with alph gal syndrome from tick bite allergic reaction will continue to monitor

## 2021-08-19 NOTE — DISCHARGE SUMMARY
"[facility]    Baldev Sandy MD    CHIEF COMPLAINT:     Altered mental status    HISTORY OF PRESENT ILLNESS:    Obtained from admitting provider HPI on 8/19/2021:  40-year-old male reportedly with a history of alpha-gal syndrome presents with onset of allergic symptomatology after eating turkey.  The patient reportedly had no other known allergens prior to that.  The patient was treated by [facility] emergency medical services with epinephrine, Benadryl 50 mg IV, Solu-Medrol 125 mg IV.  The patient reportedly was allergic on their arrival and was poorly cooperative.  The patient was noted to avoid a dropped hand while having fluttery eyelids both by EMS and the ER nursing staff.  The patient was never hypotensive and EMS reports that his skin was flushed but observed no urticaria.  There is no mucosal membrane involvement and no evidence of angioedema was detected.  Patient was noted to smell strongly of alcohol    8/19/2021 (post observation admission): Patient emergency HPI noted above including a sore throat, cough and anxiety shortly after eating turkey.  Patient does confirm his alpha gal history however he notes that he is always tolerated poultry well and that even when he does ingest meat which causes symptoms are typically occur 6 to 8 hours after the ingestion.  Patient reports that he had a \"very stressful day at work\" and was already feeling very anxious and nervous.  No skin changes, hives, swelling or edema reported by patient or family.  They note that shortly after the onset of his symptoms patient's wife became concerned and administered his EpiPen and he was subsequently given Benadryl and steroids by EMS.  Patient does report that he has been decreasing his alcohol intake lately and has been slowly weaning himself down to approximately 2 beers per day which is what he ingested on the day of presentation.  He reports that he has severe anxiety which he " currently sees a psychiatrist for on an outpatient basis and is taking both scheduled and as needed medications to assist in controlling it.  He notes he is done generally well throughout the night and he was able to sleep without difficulty.  Some mild sore throat and slight nausea remains but he denies any other symptoms including dyspnea, pain or confusion.  He is alert and oriented time my exam and is able to answer all questions.      Past Medical History:   Diagnosis Date   • Anxiety    • Depression      Past Surgical History:   Procedure Laterality Date   • APPENDECTOMY       History reviewed. No pertinent family history.  Social History     Tobacco Use   • Smoking status: Never Smoker   • Smokeless tobacco: Never Used   Vaping Use   • Vaping Use: Never used   Substance Use Topics   • Alcohol use: Yes     Alcohol/week: 4.0 standard drinks     Types: 4 Cans of beer per week   • Drug use: Never     Medications Prior to Admission   Medication Sig Dispense Refill Last Dose   • cetirizine (zyrTEC) 10 MG tablet Take 10 mg by mouth Daily.      • cholecalciferol (VITAMIN D3) 25 MCG (1000 UT) tablet Take 1,000 Units by mouth Daily. vegan      • EPINEPHrine (EpiPen 2-Benedict) 0.3 MG/0.3ML solution auto-injector injection Inject 0.3 mg into the appropriate muscle as directed by prescriber As Needed.      • famotidine (PEPCID) 40 MG tablet Take 40 mg by mouth 2 (two) times a day.      • lamoTRIgine (LaMICtal) 200 MG tablet Take 200 mg by mouth Every Morning.      • LORazepam (ATIVAN) 1 MG tablet Take 1 mg by mouth 2 (Two) Times a Day As Needed for Anxiety.      • multivitamin with minerals tablet tablet Take 1 tablet by mouth Daily.      • QUEtiapine (SEROquel) 100 MG tablet Take 100 mg by mouth Every Morning.      • QUEtiapine (SEROquel) 400 MG tablet Take 400 mg by mouth Every Night.      • venlafaxine 225 MG tablet sustained-release 24 hour 24 hr tablet Take 225 mg by mouth Every Morning.        Allergies:  Dairy aid  [lactase], Meat extract, and Zinc gelatin [zinc]      There is no immunization history on file for this patient.        REVIEW OF SYSTEMS:    Review of Systems   Constitutional: Negative.   HENT: Positive for sore throat.    Eyes: Negative.    Cardiovascular: Negative.    Respiratory: Positive for cough. Negative for shortness of breath and sputum production.    Endocrine: Negative.    Skin: Negative.    Musculoskeletal: Negative.    Gastrointestinal: Positive for nausea. Negative for abdominal pain, constipation, diarrhea, hematemesis, hematochezia, melena and vomiting.   Genitourinary: Negative.    Neurological: Negative.    Psychiatric/Behavioral: Positive for altered mental status and substance abuse. The patient is nervous/anxious.    Allergic/Immunologic: Negative.        Vital Signs  Temp:  [98.3 °F (36.8 °C)-99.1 °F (37.3 °C)] 98.3 °F (36.8 °C)  Heart Rate:  [] 116  Resp:  [16-22] 17  BP: (101-132)/(63-93) 109/63          Physical Exam:  Physical Exam  Vitals reviewed.   Constitutional:       General: He is not in acute distress.     Appearance: Normal appearance. He is normal weight. He is not ill-appearing, toxic-appearing or diaphoretic.   HENT:      Head: Normocephalic and atraumatic.      Right Ear: External ear normal.      Left Ear: External ear normal.      Nose: Nose normal.      Mouth/Throat:      Mouth: Mucous membranes are moist.   Eyes:      Extraocular Movements: Extraocular movements intact.   Cardiovascular:      Rate and Rhythm: Regular rhythm. Tachycardia present.      Pulses: Normal pulses.      Heart sounds: Normal heart sounds.   Pulmonary:      Effort: Pulmonary effort is normal.      Breath sounds: Normal breath sounds.   Abdominal:      General: Bowel sounds are normal. There is no distension.      Tenderness: There is no abdominal tenderness.   Musculoskeletal:         General: Normal range of motion.      Cervical back: Normal range of motion.   Skin:     General: Skin is warm  and dry.      Capillary Refill: Capillary refill takes less than 2 seconds.   Neurological:      General: No focal deficit present.      Mental Status: He is alert and oriented to person, place, and time.   Psychiatric:         Mood and Affect: Mood normal.         Behavior: Behavior normal.         Thought Content: Thought content normal.         Judgment: Judgment normal.         Emotional Behavior:   Normal   Debilities:  None  Results Review:    I reviewed the patient's new clinical results.  Lab Results (most recent)     Procedure Component Value Units Date/Time    Febrile Antibody Profile [[id number]] Collected: 08/19/21 0731    Specimen: Blood Updated: 08/19/21 0738    E. Chaffeenis-HME (Monocytic) [[id number]] Collected: 08/19/21 0731    Specimen: Blood Updated: 08/19/21 0738    Human Granulocytic Rufus-HGE [[id number]] Collected: 08/19/21 0731    Specimen: Blood Updated: 08/19/21 0738    CBC Auto Differential [[id number]]  (Abnormal) Collected: 08/19/21 0346    Specimen: Blood Updated: 08/19/21 0453     WBC 4.90 10*3/mm3      RBC 4.49 10*6/mm3      Hemoglobin 14.5 g/dL      Hematocrit 43.1 %      MCV 96.0 fL      MCH 32.2 pg      MCHC 33.5 g/dL      RDW 12.9 %      RDW-SD 42.9 fl      MPV 8.4 fL      Platelets 255 10*3/mm3      Neutrophil % 87.7 %      Lymphocyte % 10.4 %      Monocyte % 1.3 %      Eosinophil % 0.0 %      Basophil % 0.6 %      Neutrophils, Absolute 4.30 10*3/mm3      Lymphocytes, Absolute 0.50 10*3/mm3      Monocytes, Absolute 0.10 10*3/mm3      Eosinophils, Absolute 0.00 10*3/mm3      Basophils, Absolute 0.00 10*3/mm3      nRBC 0.1 /100 WBC     Basic Metabolic Panel [[id number]]  (Abnormal) Collected: 08/19/21 0346    Specimen: Blood Updated: 08/19/21 0446     Glucose 142 mg/dL      BUN 18 mg/dL      Creatinine 1.20 mg/dL      Sodium 138 mmol/L      Potassium 4.6 mmol/L      Comment: Slight hemolysis detected by analyzer. Results may be affected.        Chloride 104 mmol/L      CO2 21.0  mmol/L      Calcium 9.3 mg/dL      eGFR Non African Amer 68 mL/min/1.73      BUN/Creatinine Ratio 15.0     Anion Gap 13.0 mmol/L     Narrative:      GFR Normal >60  Chronic Kidney Disease <60  Kidney Failure <15      Tick Panel - Blood, Blood, Venous Line [[id number]] Collected: 08/19/21 0346    Specimen: Blood, Venous Line Updated: 08/19/21 0424    Narrative:      The following orders were created for panel order Tick Panel - Blood, Blood, Venous Line.  Procedure                               Abnormality         Status                     ---------                               -----------         ------                     Febrile Antibody Profile[[id number]]                                                    E. Chaffeenis-HME (Monoc...[[id number]]                                                 Human Granulocytic Ehrli...[[id number]]                                                 Lyme Antibody IgG[[id number]]                                In process                   Please view results for these tests on the individual orders.    Lyme Antibody IgG [[id number]] Collected: 08/19/21 0346    Specimen: Blood Updated: 08/19/21 0424    COVID PRE-OP / PRE-PROCEDURE SCREENING ORDER (NO ISOLATION) - Swab, Nasopharynx [[id number]]  (Normal) Collected: 08/19/21 0124    Specimen: Swab from Nasopharynx Updated: 08/19/21 0149    Narrative:      The following orders were created for panel order COVID PRE-OP / PRE-PROCEDURE SCREENING ORDER (NO ISOLATION) - Swab, Nasopharynx.  Procedure                               Abnormality         Status                     ---------                               -----------         ------                     COVID-19,CEPHEID/MIKE/BD...[[id number]]  Normal              Final result                 Please view results for these tests on the individual orders.    COVID-19,CEPHEID/MIKE/BDMAX,COR/JENNIFER/PAD/DIANDRA IN-HOUSE(OR EMERGENT/ADD-ON),NP SWAB IN TRANSPORT MEDIA 3-4 HR TAT, RT-PCR - Swab, Nasopharynx  [[id number]]  (Normal) Collected: 08/19/21 0124    Specimen: Swab from Nasopharynx Updated: 08/19/21 0149     COVID19 Not Detected    Narrative:      Fact sheet for providers: https://www.fda.gov/media/921298/download     Fact sheet for patients: https://www.fda.gov/media/939312/download  Fact sheet for providers: https://www.fda.gov/media/805295/download    Fact sheet for patients: https://www.fda.gov/media/308844/download    Test performed by PCR.    [place] Draw [[id number]] Collected: 08/18/21 2027    Specimen: Blood Updated: 08/18/21 2130    Narrative:      The following orders were created for panel order [place] Draw.  Procedure                               Abnormality         Status                     ---------                               -----------         ------                     Green Top (Gel)[[id number]]                                  Final result               Lavender Top[[id number]]                                     Final result               Gold Top - SST[[id number]]                                   Final result                 Please view results for these tests on the individual orders.    Gold Top - SST [[id number]] Collected: 08/18/21 2027    Specimen: Blood Updated: 08/18/21 2130     Extra Tube Hold for add-ons.     Comment: Auto resulted.       Green Top (Gel) [[id number]] Collected: 08/18/21 2027    Specimen: Blood Updated: 08/18/21 2119     Extra Tube HOLD    Troponin [[id number]]  (Normal) Collected: 08/18/21 2027    Specimen: Blood Updated: 08/18/21 2109     Troponin T <0.010 ng/mL     Narrative:      Troponin T Reference Range:  <= 0.03 ng/mL-   Negative for AMI  >0.03 ng/mL-     Abnormal for myocardial necrosis.  Clinicians would have to utilize clinical acumen, EKG, Troponin and serial changes to determine if it is an Acute Myocardial Infarction or myocardial injury due to an underlying chronic condition.       Results may be falsely decreased if patient taking Biotin.       Comprehensive Metabolic Panel [[id number]]  (Abnormal) Collected: 08/18/21 2027    Specimen: Blood Updated: 08/18/21 2106     Glucose 133 mg/dL      BUN 18 mg/dL      Creatinine 1.27 mg/dL      Sodium 141 mmol/L      Potassium 3.5 mmol/L      Chloride 105 mmol/L      CO2 20.0 mmol/L      Calcium 8.6 mg/dL      Total Protein 6.5 g/dL      Albumin 4.50 g/dL      ALT (SGPT) 21 U/L      AST (SGOT) 18 U/L      Alkaline Phosphatase 43 U/L      Total Bilirubin 0.4 mg/dL      eGFR Non African Amer 63 mL/min/1.73      eGFR  African Amer 77 mL/min/1.73      Globulin 2.0 gm/dL      A/G Ratio 2.3 g/dL      BUN/Creatinine Ratio 14.2     Anion Gap 16.0 mmol/L     Narrative:      GFR Normal >60  Chronic Kidney Disease <60  Kidney Failure <15      CK [[id number]]  (Normal) Collected: 08/18/21 2027    Specimen: Blood Updated: 08/18/21 2106     Creatine Kinase 60 U/L     Magnesium [[id number]]  (Normal) Collected: 08/18/21 2027    Specimen: Blood Updated: 08/18/21 2106     Magnesium 2.2 mg/dL     Ethanol [[id number]] Collected: 08/18/21 2027    Specimen: Blood Updated: 08/18/21 2106     Ethanol % 0.097 %     Narrative:      Plasma Ethanol Clinical Symptoms:    ETOH (%)               Clinical Symptom  .01-.05              No apparent influence  .03-.12              Euphoria, Diminished judgment and attention   .09-.25              Impaired comprehension, Muscle incoordination  .18-.30              Confusion, Staggered gait, Slurred speech  .25-.40              Markedly decreased response to stimuli, unable to stand or                        walk, vomitting, sleep or stupor  .35-.50              Comatose, Anesthesia, Subnormal body temperature        Phosphorus [[id number]]  (Normal) Collected: 08/18/21 2027    Specimen: Blood Updated: 08/18/21 2106     Phosphorus 2.6 mg/dL     CBC & Differential [[id number]]  (Abnormal) Collected: 08/18/21 2027    Specimen: Blood Updated: 08/18/21 2105    Narrative:      The following orders were created  for panel order CBC & Differential.  Procedure                               Abnormality         Status                     ---------                               -----------         ------                     CBC Auto Differential[[id number]]        Abnormal            Final result                 Please view results for these tests on the individual orders.    CBC Auto Differential [[id number]]  (Abnormal) Collected: 08/18/21 2027    Specimen: Blood Updated: 08/18/21 2105     WBC 10.70 10*3/mm3      RBC 4.33 10*6/mm3      Hemoglobin 14.5 g/dL      Hematocrit 41.7 %      MCV 96.4 fL      MCH 33.4 pg      MCHC 34.7 g/dL      RDW 13.1 %      RDW-SD 43.8 fl      MPV 8.2 fL      Platelets 314 10*3/mm3      Neutrophil % 64.5 %      Lymphocyte % 25.2 %      Monocyte % 8.7 %      Eosinophil % 1.0 %      Basophil % 0.6 %      Neutrophils, Absolute 6.90 10*3/mm3      Lymphocytes, Absolute 2.70 10*3/mm3      Monocytes, Absolute 0.90 10*3/mm3      Eosinophils, Absolute 0.10 10*3/mm3      Basophils, Absolute 0.10 10*3/mm3      nRBC 0.1 /100 WBC     Lavender Top [[id number]] Collected: 08/18/21 2027    Specimen: Blood Updated: 08/18/21 2101    Urine Drug Screen - Urine, Clean Catch [[id number]]  (Normal) Collected: 08/18/21 2027    Specimen: Urine, Clean Catch Updated: 08/18/21 2057     Amphet/Methamphet, Screen Negative     Barbiturates Screen, Urine Negative     Benzodiazepine Screen, Urine Negative     Cocaine Screen, Urine Negative     Opiate Screen Negative     THC, Screen, Urine Negative     Methadone Screen, Urine Negative     Oxycodone Screen, Urine Negative    Narrative:      Negative Thresholds Per Drugs Screened:    Amphetamines                 500 ng/ml  Barbiturates                 200 ng/ml  Benzodiazepines              100 ng/ml  Cocaine                      300 ng/ml  Methadone                    300 ng/ml  Opiates                      300 ng/ml  Oxycodone                    100 ng/ml  THC                            50 ng/ml    The Normal Value for all drugs tested is negative. This report includes final unconfirmed screening results to be used for medical treatment purposes only. Unconfirmed results must not be used for non-medical purposes such as employment or legal testing. Clinical consideration should be applied to any drug of abuse test, particularly when unconfirmed results are used.          All urine drugs of abuse requests without chain of custody are for medical screening purposes only.  False positives are possible.      Urinalysis With Culture If Indicated - Urine, Catheter [[id number]]  (Normal) Collected: 08/18/21 2027    Specimen: Urine, Catheter Updated: 08/18/21 2057     Color, UA Yellow     Appearance, UA Clear     pH, UA 6.0     Specific Gravity, UA <=1.005     Glucose, UA Negative     Ketones, UA Negative     Bilirubin, UA Negative     Blood, UA Negative     Protein, UA Negative     Leuk Esterase, UA Negative     Nitrite, UA Negative     Urobilinogen, UA 0.2 E.U./dL    Narrative:      Urine microscopic not indicated.    POC Glucose Once [[id number]]  (Abnormal) Collected: 08/18/21 2017    Specimen: Blood Updated: 08/18/21 2019     Glucose 154 mg/dL      Comment: Serial Number: [id number]:  [id number]             Imaging Results (Most Recent)     Procedure Component Value Units Date/Time    CT Head Without Contrast [[id number]] Collected: 08/18/21 2116     Updated: 08/18/21 2127    Narrative:      CT HEAD WO CONTRAST-     Date of Exam: 8/18/2021 9:06 PM     Indication: altered mental status.     Comparison: None available.     Technique:  Without contrast, contiguous axial CT images of the head  were obtained from skull base to vertex.  Coronal and sagittal  reconstructions were performed.  Automated exposure control and  iterative reconstruction methods were used.     FINDINGS  No evidence of intracranial hemorrhage, mass, or midline shift. The  ventricles appear normal in size for the patient's age. No  extra-axial  collections identified. The gray white matter differentiation is intact.  No air-fluid levels identified within the paranasal sinuses. The  extra-axial structures demonstrate no acute process. An endotracheal  tube is present.       Impression:      No evidence of hemorrhage, mass effect or midline shift. No acute  process identified.     Electronically Signed By-Nohemi Kelley MD On:8/18/2021 9:16 PM  This report was finalized on [id number] by  Nohemi Kelley MD.        reviewed    ECG/EMG Results (most recent)     None        not reviewed            Microbiology Results (last 10 days)     Procedure Component Value - Date/Time    COVID PRE-OP / PRE-PROCEDURE SCREENING ORDER (NO ISOLATION) - Swab, Nasopharynx [[id number]]  (Normal) Collected: 08/19/21 0124    Lab Status: Final result Specimen: Swab from Nasopharynx Updated: 08/19/21 0149    Narrative:      The following orders were created for panel order COVID PRE-OP / PRE-PROCEDURE SCREENING ORDER (NO ISOLATION) - Swab, Nasopharynx.  Procedure                               Abnormality         Status                     ---------                               -----------         ------                     COVID-19,CEPHEID/MIKE/BD...[[id number]]  Normal              Final result                 Please view results for these tests on the individual orders.    COVID-19,CEPHEID/MIKE/BDMAX,COR/JENNIFER/PAD/DIANDRA IN-HOUSE(OR EMERGENT/ADD-ON),NP SWAB IN TRANSPORT MEDIA 3-4 HR TAT, RT-PCR - Swab, Nasopharynx [[id number]]  (Normal) Collected: 08/19/21 0124    Lab Status: Final result Specimen: Swab from Nasopharynx Updated: 08/19/21 0149     COVID19 Not Detected    Narrative:      Fact sheet for providers: https://www.fda.gov/media/830772/download     Fact sheet for patients: https://www.fda.gov/media/656129/download  Fact sheet for providers: https://www.fda.gov/media/381361/download    Fact sheet for patients: https://www.fda.gov/media/277344/download    Test performed  by PCR.          Assessment/Plan     Allergic reaction    Allergy to alpha-gal    Alcoholic intoxication without complication (CMS/HCC)    Altered mental status        Altered mental status  -Troponin less than 0.010, CK: 60  -Anion gap initially 16.0 which returned to normal at 13.0 the following morning  -Ethanol level: 0.097  -UA unremarkable  -Urine tox screen negative  -CT of head showed no evidence of hemorrhage, mass-effect or midline shift with no acute process identified  -Repeat tick panel pending  -EKG:Sinus rhythm at 96 without obvious acute ST changes or ectopy and a QTC of 4 and 32 ms:  -Continue compliance with outpatient Seroquel, venlafaxine, Lamictal and Ativan therapy    Possible allergic reaction  -Patient received epinephrine per family prior to EMS arrival, IV Benadryl and has received IV steroids without recurrence of any allergic symptoms  -Patient encouraged to continue his Zyrtec and monitor for any symptoms  -Replacement EpiPen ordered  -Discussed potential repeat allergy testing with primary care provider if symptoms recur    I discussed the patients findings and my recommendations with patient and family.     Discharge Diagnosis:      Allergic reaction    Allergy to alpha-gal    Alcoholic intoxication without complication (CMS/HCC)    Altered mental status      Hospital Course  Patient is a 40 y.o. male presented with an altered mental status and HPI noted above.  Patient had received epinephrine via family prior to EMS arrival and also received IV Benadryl with steroids with the ED provider noting patient remained altered at the time of his presentation.  1 episode of convulsions which resolved with ammonia inhalant and no obvious postictal period or incontinence was reported in the ED prior to admission.  CT was head showed no acute process and UA and CBC were generally unremarkable.  He was initially noted to have an elevated anion gap at 16.0 which returned to 13.0 the following day.  " Ethanol level was 0.097 and patient and family confirm that he has been decreasing his alcohol intake recently and is currently drinking approximately 2 beers per day.  Patient given 1 L saline bolus in the ED and started on thiamine infusion.  He was admitted for observation and has done generally well throughout the night without obvious return of symptoms.  He reports he was able to sleep well.  He feels his mental status has returned to normal though he reports he can only remember \"bits and pieces\" of things from the previous day.  He does confirm significant increase in his work stress and that he has a history of severe anxiety which can cause him profound emotional distress at times.  He confirms compliance with his outpatient medical therapy including scheduled and as needed anxiety treatments.  He does continue to note a mild sore throat but denies any dyspnea at present.  He was noted to be somewhat tachycardic on the morning following his admission and EKG was obtained which showed sinus rhythm at 96 without obvious acute ST changes or ectopy.  Tick panel was ordered and is pending.  At this time patient is felt to be in good condition for discharge without obvious return of symptoms since his presentation.  He confirms that he has a scheduled vacation to [place] upcoming and believes his anxiety will be appropriately managed for now.  Discussed his testing/results as well as concerning/alarm symptoms for which to call 911/return to the ED with patient and family and they verbalized their understanding and agreement.  Also discussed close follow-up with PCP and possible discussion about repeat allergy testing if mild symptoms return (he is instructed to call 911/return to the ED if severe allergic symptoms return or he requires administration of EpiPen).  A replacement EpiPen is also prescribed at discharge.  He will also wear Holter monitor for 24 hours.    Past Medical History:     Past Medical " History:   Diagnosis Date   • Anxiety    • Depression        Past Surgical History:     Past Surgical History:   Procedure Laterality Date   • APPENDECTOMY         Social History:   Social History     Socioeconomic History   • Marital status:      Spouse name: Not on file   • Number of children: Not on file   • Years of education: Not on file   • Highest education level: Not on file   Tobacco Use   • Smoking status: Never Smoker   • Smokeless tobacco: Never Used   Vaping Use   • Vaping Use: Never used   Substance and Sexual Activity   • Alcohol use: Yes     Alcohol/week: 4.0 standard drinks     Types: 4 Cans of beer per week   • Drug use: Never   • Sexual activity: Defer     Partners: Female       Procedures Performed         Consults:   Consults     No orders found for last 30 day(s).          Condition on Discharge:     Stable    Discharge Disposition      Discharge Medications     Discharge Medications      ASK your doctor about these medications      Instructions Start Date   cetirizine 10 MG tablet  Commonly known as: zyrTEC   10 mg, Oral, Daily      cholecalciferol 25 MCG (1000 UT) tablet  Commonly known as: VITAMIN D3   1,000 Units, Oral, Daily, vegan       EpiPen 2-Benedict 0.3 MG/0.3ML solution auto-injector injection  Generic drug: EPINEPHrine   0.3 mg, Intramuscular, As Needed      famotidine 40 MG tablet  Commonly known as: PEPCID   40 mg, Oral, 2 times daily      LaMICtal 200 MG tablet  Generic drug: lamoTRIgine   200 mg, Oral, Every Morning      LORazepam 1 MG tablet  Commonly known as: ATIVAN   1 mg, Oral, 2 Times Daily PRN      multivitamin with minerals tablet tablet   1 tablet, Oral, Daily      QUEtiapine 100 MG tablet  Commonly known as: SEROquel   100 mg, Oral, Every Early Morning      SEROquel 400 MG tablet  Generic drug: QUEtiapine   400 mg, Oral, Nightly      venlafaxine 225 MG tablet sustained-release 24 hour 24 hr tablet   225 mg, Oral, Every Early Morning             Discharge Diet:      Activity at Discharge:     Follow-up Appointments  No future appointments.      Test Results Pending at Discharge  Pending Labs     Order Current Status    E. Chaffeenis-HME (Monocytic) In process    Febrile Antibody Profile In process    Human Granulocytic Rufus-HGE In process    Lyme Antibody IgG In process    Tick Panel - Blood, Blood, Venous Line In process           Risk for Readmission (LACE) Score: 1 (8/19/2021  6:01 AM)          Valentin Rivas PA-C  08/19/21  08:57 EDT

## 2021-08-19 NOTE — ED PROVIDER NOTES
Subjective   40-year-old male reportedly with a history of alpha-gal syndrome presents with onset of allergic symptomatology after eating turkey.  The patient reportedly had no other known allergens prior to that.  The patient was treated by [facility] emergency medical services with epinephrine, Benadryl 50 mg IV, Solu-Medrol 125 mg IV.  The patient reportedly was allergic on their arrival and was poorly cooperative.  The patient was noted to avoid a dropped hand while having fluttery eyelids both by EMS and the ER nursing staff.  The patient was never hypotensive and EMS reports that his skin was flushed but observed no urticaria.  There is no mucosal membrane involvement and no evidence of angioedema was detected.  Patient was noted to smell strongly of alcohol          Review of Systems   Unable to perform ROS: Mental status change       No past medical history on file.  Patient has a history of anxiety and takes Xanax for that.  No other substance abuse is noted.  The patient's wife reports she is unaware of any increased stress today.  Allergies   Allergen Reactions   • Dairy Aid [Lactase] Anaphylaxis   • Meat Extract Anaphylaxis   • Zinc Gelatin [Zinc] Anaphylaxis       No past surgical history on file.    No family history on file.    Social History     Socioeconomic History   • Marital status:      Spouse name: Not on file   • Number of children: Not on file   • Years of education: Not on file   • Highest education level: Not on file     No other reports of unusual food water travel or activity      Objective   Physical Exam  Lethargic poorly responsive avoided dropped hand fluttering eyelids will not respond to command but uses arm to facilitate blood pressure cuff placement   Alesia Coma Scale 13?   HEENT: Pupils equal and reactive to light. Conjunctivae are not injected. normal tympanic membranes. Oropharynx and nares are normal.  No intraoral edema or any angioedema is detected   Neck:  Supple. Midline trachea. No JVD. No goiter.   Chest: Clear and equal breath sounds bilaterally regular rate and rhythm without murmur or rub.   Abdomen: Positive bowel sounds nontender nondistended. No rebound or peritoneal signs. No CVA tenderness.   Extremities no clubbing cyanosis or edema motor sensory exam is normal the full range of motion is intact   skin: Warm and dry, no rashes or petechia.  No urticaria, erythema multiforme, or erythema chronicum migrans detected  Lymphatic: No regional lymphadenopathy. No calf pain, swelling or Marta's sign    Procedures           ED Course                                 Labs Reviewed   COMPREHENSIVE METABOLIC PANEL - Abnormal; Notable for the following components:       Result Value    Glucose 133 (*)     CO2 20.0 (*)     Anion Gap 16.0 (*)     All other components within normal limits    Narrative:     GFR Normal >60  Chronic Kidney Disease <60  Kidney Failure <15     CBC WITH AUTO DIFFERENTIAL - Abnormal; Notable for the following components:    MCH 33.4 (*)     All other components within normal limits   POCT GLUCOSE FINGERSTICK - Abnormal; Notable for the following components:    Glucose 154 (*)     All other components within normal limits   URINE DRUG SCREEN - Normal    Narrative:     Negative Thresholds Per Drugs Screened:    Amphetamines                 500 ng/ml  Barbiturates                 200 ng/ml  Benzodiazepines              100 ng/ml  Cocaine                      300 ng/ml  Methadone                    300 ng/ml  Opiates                      300 ng/ml  Oxycodone                    100 ng/ml  THC                           50 ng/ml    The Normal Value for all drugs tested is negative. This report includes final unconfirmed screening results to be used for medical treatment purposes only. Unconfirmed results must not be used for non-medical purposes such as employment or legal testing. Clinical consideration should be applied to any drug of abuse test,  particularly when unconfirmed results are used.          All urine drugs of abuse requests without chain of custody are for medical screening purposes only.  False positives are possible.     URINALYSIS W/ CULTURE IF INDICATED - Normal    Narrative:     Urine microscopic not indicated.   TROPONIN (IN-HOUSE) - Normal    Narrative:     Troponin T Reference Range:  <= 0.03 ng/mL-   Negative for AMI  >0.03 ng/mL-     Abnormal for myocardial necrosis.  Clinicians would have to utilize clinical acumen, EKG, Troponin and serial changes to determine if it is an Acute Myocardial Infarction or myocardial injury due to an underlying chronic condition.       Results may be falsely decreased if patient taking Biotin.     CK - Normal   MAGNESIUM - Normal   PHOSPHORUS - Normal   RAINBOW DRAW    Narrative:     The following orders were created for panel order [place] Draw.  Procedure                               Abnormality         Status                     ---------                               -----------         ------                     Green Top (Gel)[[id number]]                                  Final result               Lavender Top[[id number]]                                     Final result               Gold Top - SST[[id number]]                                   Final result                 Please view results for these tests on the individual orders.   ETHANOL    Narrative:     Plasma Ethanol Clinical Symptoms:    ETOH (%)               Clinical Symptom  .01-.05              No apparent influence  .03-.12              Euphoria, Diminished judgment and attention   .09-.25              Impaired comprehension, Muscle incoordination  .18-.30              Confusion, Staggered gait, Slurred speech  .25-.40              Markedly decreased response to stimuli, unable to stand or                        walk, vomitting, sleep or stupor  .35-.50              Comatose, Anesthesia, Subnormal body temperature       GREEN TOP    LAVENDER TOP   GOLD TOP - SST   CBC AND DIFFERENTIAL    Narrative:     The following orders were created for panel order CBC & Differential.  Procedure                               Abnormality         Status                     ---------                               -----------         ------                     CBC Auto Differential[[id number]]        Abnormal            Final result                 Please view results for these tests on the individual orders.     Medications   sodium chloride 0.9 % flush 10 mL (has no administration in time range)   famotidine (PEPCID) injection 20 mg (20 mg Intravenous Given 8/18/21 2026)   ondansetron (ZOFRAN) injection 8 mg (8 mg Intravenous Given 8/18/21 2020)   sodium chloride 0.9 % bolus 1,000 mL (0 mL Intravenous Stopped 8/18/21 2034)   Naloxone HCl (NARCAN) injection 2 mg (2 mg Intravenous Given 8/18/21 2014)     CT Head Without Contrast    Result Date: 8/18/2021  No evidence of hemorrhage, mass effect or midline shift. No acute process identified.  Electronically Signed By-Nohemi Kelley MD On:8/18/2021 9:16 PM This report was finalized on [id number] by  Nohemi Kelley MD.              MDM  Number of Diagnoses or Management Options     Amount and/or Complexity of Data Reviewed  Clinical lab tests: reviewed    Risk of Complications, Morbidity, and/or Mortality  Presenting problems: high  Diagnostic procedures: high  Management options: high  General comments: Highly atypical presentation made more difficult to read by the Benadryl intravenous on top of a alcohol intoxication level finding.  The patient had no hypotension while in the ER.  He was transiently tachycardic.  The patient had an episode where he indicated to his wife that she needed help she called for the nurse and when she went in she found the patient thrashing around the bed.  He received  an ammonia ampule and the episode stopped and the patient returned to the previous level of consciousness there is no  postictal phase or incontinence.  The patient will be observed overnight we will repeat the tick panel which was negative 4 weeks ago.  The patient may require repeat allergic testing if symptomatology continues.  I cannot exclude a acute stress reaction component to tonight's presentation        Final diagnoses:   Allergic reaction, initial encounter   Allergy to alpha-gal   Alcoholic intoxication without complication (CMS/[facility])   Altered mental status, unspecified altered mental status type       ED Disposition  ED Disposition     ED Disposition Condition Comment    Decision to Admit            No follow-up provider specified.       Medication List      No changes were made to your prescriptions during this visit.          Simone Callahan MD  08/19/21 0027

## 2021-08-19 NOTE — OUTREACH NOTE
Medical Week 3 Survey      Responses   [facility] patient discharged from?  [facility]   Does the patient have one of the following disease processes/diagnoses(primary or secondary)?  Other   Week 3 attempt successful?  Yes   Call start time  1424   Call end time  1433   Meds reviewed with patient/caregiver?  Yes   Is the patient having any side effects they believe may be caused by any medication additions or changes?  No   Does the patient have all medications ordered at discharge?  Yes   Is the patient taking all medications as directed (includes completed medication regime)?  Yes   Comments regarding appointments  PCP appt 08/30/21.   Does the patient have a primary care provider?   Yes   Does the patient have an appointment with their PCP within 7 days of discharge?  Greater than 7 days   What is preventing the patient from scheduling follow up appointments within 7 days of discharge?  Earlier appointment not available   Has the patient kept scheduled appointments due by today?  N/A   Has home health visited the patient within 72 hours of discharge?  N/A   Psychosocial issues?  No   Did the patient receive a copy of their discharge instructions?  Yes   Nursing interventions  Reviewed instructions with patient   What is the patient's perception of their health status since discharge?  Improving   Is the patient/caregiver able to teach back signs and symptoms related to disease process for when to call PCP?  Yes   Is the patient/caregiver able to teach back signs and symptoms related to disease process for when to call 911?  Yes   Is the patient/caregiver able to teach back the hierarchy of who to call/visit for symptoms/problems? PCP, Specialist, Home health nurse, Urgent Care, ED, 911  Yes   If the patient is a current smoker, are they able to teach back resources for cessation?  Not a smoker   Week 3 Call Completed?  Yes   Graduated  Yes   Is the patient interested in additional calls from an ambulatory case  manager?  NOTE:  applies to high risk patients requiring additional follow-up.  No   Did the patient feel the follow up calls were helpful during their recovery period?  Yes   Was the number of calls appropriate?  Yes   Graduated/Revoked comments  States will be leaving for [place] for vacation in 2 days.   Wrap up additional comments  States is feeling better-was just released from hospital today after giving himself epinephrine for what he thought was an allergic reaction, but was a panic attack. States is doing well, and leaving for [place] in 2 days. States will call if any questions/concerns.          Navya Phillip RN

## 2021-08-19 NOTE — ED NOTES
While sitting by patient room he started to become very hyperpneic, His breathing could be heard at the nurses desk. When I turned around to check on patient his wife called out asking for help.. Pt was jerking around in bed and was  not responsive to voice or stimulation. Ammonia cap was popped, pt immediately responded by gagging and pulling away. Provider and nurse aware. Pads were placed on bed rails for patient safety.      Ira Escobedo, LPN  08/18/21 6122

## 2021-08-20 ENCOUNTER — TRANSITIONAL CARE MANAGEMENT TELEPHONE ENCOUNTER (OUTPATIENT)
Dept: CALL CENTER | Facility: HOSPITAL | Age: 41
End: 2021-08-20

## 2021-08-20 LAB — B BURGDOR IGG SER QL: NEGATIVE

## 2021-08-20 NOTE — OUTREACH NOTE
Call Center TCM Note      Responses   [facility] patient discharged from?  [facility]   Does the patient have one of the following disease processes/diagnoses(primary or secondary)?  Other   TCM attempt successful?  No   Unsuccessful attempts  Attempt 1          Cinthia Reeder RN    8/20/2021, 16:06 EDT

## 2021-08-20 NOTE — OUTREACH NOTE
Call Center TCM Note      Responses   [facility] patient discharged from?  [facility]   Does the patient have one of the following disease processes/diagnoses(primary or secondary)?  Other   TCM attempt successful?  Yes   Call start time  1657   Call end time  1702   Discharge diagnosis  Altered mental status,Possible allergic reaction   Is patient permission given to speak with other caregiver?  No   Meds reviewed with patient/caregiver?  Yes   Does the patient have all medications ordered at discharge?  Yes   Is the patient taking all medications as directed (includes completed medication regime)?  Yes   Does the patient have a primary care provider?   Yes   Does the patient have an appointment with their PCP within 7 days of discharge?  No   Comments regarding PCP  PCP Dr Baldev Sandy. Patient declined to schedule offered hospital follow up appt as he reports he will be in [place].    What is preventing the patient from scheduling follow up appointments within 7 days of discharge?  -- [Patient reports that he has had phone contact with PCP. ]   Has the patient kept scheduled appointments due by today?  N/A   Has home health visited the patient within 72 hours of discharge?  N/A   Psychosocial issues?  No   Did the patient receive a copy of their discharge instructions?  Yes   Nursing interventions  Reviewed instructions with patient   What is the patient's perception of their health status since discharge?  Improving   Is the patient/caregiver able to teach back signs and symptoms related to disease process for when to call PCP?  Yes   Is the patient/caregiver able to teach back signs and symptoms related to disease process for when to call 911?  Yes   If the patient is a current smoker, are they able to teach back resources for cessation?  Not a smoker   TCM call completed?  Yes   Wrap up additional comments  Patient denies questions or needs today.           Cinthia Reeder RN    8/20/2021, 17:02  EDT

## 2021-08-24 LAB
A PHAGOCYTOPH IGG TITR SER IF: NEGATIVE {TITER}
A PHAGOCYTOPH IGM TITR SER IF: NEGATIVE {TITER}
BRUCELLA IGG SER QL IA: NEGATIVE
BRUCELLA IGM SER QL IA: NEGATIVE
E CHAFFEENSIS IGG TITR SER IF: NEGATIVE {TITER}
E CHAFFEENSIS IGM TITR SER IF: NEGATIVE {TITER}
RICK SF IGG TITR SER IF: NORMAL {TITER}
RICK SF IGM TITR SER IF: NORMAL {TITER}
RICK TYPHUS IGG TITR SER IF: NORMAL {TITER}
RICK TYPHUS IGM TITR SER IF: NORMAL {TITER}

## 2021-08-25 PROCEDURE — 93227 XTRNL ECG REC<48 HR R&I: CPT | Performed by: INTERNAL MEDICINE

## 2021-12-20 ENCOUNTER — TELEPHONE (OUTPATIENT)
Dept: FAMILY MEDICINE CLINIC | Facility: CLINIC | Age: 41
End: 2021-12-20

## 2021-12-20 DIAGNOSIS — H10.33 ACUTE CONJUNCTIVITIS OF BOTH EYES, UNSPECIFIED ACUTE CONJUNCTIVITIS TYPE: Primary | ICD-10-CM

## 2021-12-20 RX ORDER — ERYTHROMYCIN 5 MG/G
OINTMENT OPHTHALMIC 3 TIMES DAILY
Qty: 1 EACH | Refills: 0 | Status: SHIPPED | OUTPATIENT
Start: 2021-12-20 | End: 2021-12-25

## 2022-02-07 ENCOUNTER — OFFICE VISIT (OUTPATIENT)
Dept: FAMILY MEDICINE CLINIC | Facility: CLINIC | Age: 42
End: 2022-02-07

## 2022-02-07 ENCOUNTER — PATIENT MESSAGE (OUTPATIENT)
Dept: FAMILY MEDICINE CLINIC | Facility: CLINIC | Age: 42
End: 2022-02-07

## 2022-02-07 VITALS
SYSTOLIC BLOOD PRESSURE: 114 MMHG | HEART RATE: 110 BPM | HEIGHT: 72 IN | WEIGHT: 173 LBS | OXYGEN SATURATION: 98 % | RESPIRATION RATE: 18 BRPM | BODY MASS INDEX: 23.43 KG/M2 | DIASTOLIC BLOOD PRESSURE: 82 MMHG | TEMPERATURE: 98.2 F

## 2022-02-07 DIAGNOSIS — U07.1 UPPER RESPIRATORY TRACT INFECTION DUE TO COVID-19 VIRUS: ICD-10-CM

## 2022-02-07 DIAGNOSIS — J06.9 UPPER RESPIRATORY TRACT INFECTION DUE TO COVID-19 VIRUS: ICD-10-CM

## 2022-02-07 DIAGNOSIS — R06.02 SHORTNESS OF BREATH: Primary | ICD-10-CM

## 2022-02-07 PROBLEM — J69.0 ASPIRATION PNEUMONIA (HCC): Status: RESOLVED | Noted: 2021-08-01 | Resolved: 2022-02-07

## 2022-02-07 PROCEDURE — 99212 OFFICE O/P EST SF 10 MIN: CPT | Performed by: FAMILY MEDICINE

## 2022-02-07 NOTE — PROGRESS NOTES
Subjective   Warren Giraldo is a 41 y.o. male.   Chief Complaint   Patient presents with   • Shortness of Breath       Patient tested positive for covid on 1/13/2022  Slowly feeling better     Shortness of Breath  This is a new problem. The current episode started 1 to 4 weeks ago. Associated symptoms include chest pain and headaches. Pertinent negatives include no abdominal pain, ear pain, fever, leg swelling, neck pain, rash, swollen glands, vomiting or wheezing. The symptoms are aggravated by any activity. He has tried nothing for the symptoms.        The following portions of the patient's history were reviewed and updated as appropriate: allergies, current medications, past family history, past medical history, past social history, past surgical history and problem list.    Patient Active Problem List   Diagnosis   • Acne varioliformis   • Acute stress disorder   • Allergic rhinitis   • Anxiety   • Classical migraine   • Costochondritis   • Gastroesophageal reflux disease with esophagitis   • PUD (peptic ulcer disease)   • Anaphylaxis due to food   • Allergic reaction to alpha-gal   • Bipolar affective disorder, depressed (HCC)   • Tick bite   • Shortness of breath       Current Outpatient Medications on File Prior to Visit   Medication Sig Dispense Refill   • cetirizine (zyrTEC) 10 MG tablet Take 10 mg by mouth Daily.     • cholecalciferol (VITAMIN D3) 25 MCG (1000 UT) tablet Take 1,000 Units by mouth Daily. vegan     • EPINEPHrine (EPIPEN) 0.3 MG/0.3ML solution auto-injector injection Inject 0.3 mg into the appropriate muscle as directed by prescriber.     • famotidine (PEPCID) 40 MG tablet Take 40 mg by mouth 2 (two) times a day.     • lamoTRIgine (LaMICtal) 200 MG tablet Take 200 mg by mouth Every Morning.     • levalbuterol (XOPENEX HFA) 45 MCG/ACT inhaler Inhale 1-2 puffs Every 4 (Four) Hours As Needed for Wheezing. 1 each 0   • LORazepam (ATIVAN) 1 MG tablet Take 1 mg by mouth 2 (Two) Times a Day As  Needed for Anxiety.     • multivitamin with minerals tablet tablet Take 1 tablet by mouth Daily.     • QUEtiapine (SEROquel) 100 MG tablet Take 100 mg by mouth Every Morning.     • QUEtiapine (SEROquel) 400 MG tablet Take 400 mg by mouth Every Night.     • venlafaxine 225 MG tablet sustained-release 24 hour 24 hr tablet Take 225 mg by mouth Every Morning.     • [DISCONTINUED] cetirizine (zyrTEC) 10 MG tablet Take 10 mg by mouth Daily.     • [DISCONTINUED] EPINEPHrine (EpiPen 2-Benedict) 0.3 MG/0.3ML solution auto-injector injection Inject 0.3 mg into the appropriate muscle as directed by prescriber As Needed.     • [DISCONTINUED] famotidine (PEPCID) 20 MG tablet Take 40 mg by mouth 2 (Two) Times a Day.     • [DISCONTINUED] lamoTRIgine (LaMICtal) 200 MG tablet Take 200 mg by mouth Every Night.     • [DISCONTINUED] LORazepam (ATIVAN) 1 MG tablet Take 1 mg by mouth 2 (Two) Times a Day As Needed.  2   • [DISCONTINUED] multivitamin (ONE-A-DAY MENS PO) Take 1 tablet by mouth Daily.     • [DISCONTINUED] QUEtiapine (SEROquel) 100 MG tablet Take 100 mg by mouth Daily. Takes 100 mg every morning     • [DISCONTINUED] venlafaxine 225 MG tablet sustained-release 24 hour 24 hr tablet Take 225 mg by mouth Daily With Breakfast.       No current facility-administered medications on file prior to visit.     Current outpatient and discharge medications have been reconciled for the patient.  Reviewed by: Baldev Sandy MD      Allergies   Allergen Reactions   • Bee Venom Anaphylaxis   • Beef-Derived Products Anaphylaxis     Alpha-gal   • Dairy Aid [Lactase] Anaphylaxis   • Latex Anaphylaxis   • Meat Extract Anaphylaxis   • Milk Protein Anaphylaxis     Alpha-Gal   • Other Anaphylaxis     Has Alpha gal so any mammal products   • Pork-Derived Products Anaphylaxis     Alpha-gal     • Zinc Gelatin [Zinc] Anaphylaxis       Review of Systems   Constitutional: Negative for activity change, appetite change, fatigue and fever.   HENT:  "Negative for ear pain, swollen glands and voice change.    Eyes: Negative for visual disturbance.   Respiratory: Positive for shortness of breath. Negative for wheezing.    Cardiovascular: Positive for chest pain. Negative for leg swelling.   Gastrointestinal: Negative for abdominal pain, blood in stool, constipation, diarrhea, nausea and vomiting.   Endocrine: Negative for polydipsia and polyuria.   Genitourinary: Negative for dysuria, frequency and hematuria.   Musculoskeletal: Negative for joint swelling, neck pain and neck stiffness.   Skin: Negative for rash and wound.   Neurological: Negative for weakness, numbness and headache.   Psychiatric/Behavioral: Negative for suicidal ideas and depressed mood.     I have reviewed and confirmed the accuracy of the ROS as documented by the MA/LPN/RN Baldev Sandy MD    Objective   Visit Vitals  /82 (BP Location: Right arm, Patient Position: Sitting, Cuff Size: Adult)   Pulse 110   Temp 98.2 °F (36.8 °C)   Resp 18   Ht 182.9 cm (72\")   Wt 78.5 kg (173 lb)   SpO2 98%   BMI 23.46 kg/m²       Physical Exam  Constitutional:       Appearance: He is well-developed.   HENT:      Head: Normocephalic and atraumatic.      Right Ear: External ear normal.      Left Ear: External ear normal.      Nose: Nose normal.   Eyes:      Pupils: Pupils are equal, round, and reactive to light.   Cardiovascular:      Rate and Rhythm: Normal rate and regular rhythm.      Heart sounds: Normal heart sounds.   Pulmonary:      Effort: Pulmonary effort is normal.      Breath sounds: Normal breath sounds.   Abdominal:      General: Bowel sounds are normal.      Palpations: Abdomen is soft.   Musculoskeletal:         General: Normal range of motion.      Cervical back: Normal range of motion and neck supple.   Skin:     General: Skin is warm and dry.   Neurological:      Mental Status: He is alert and oriented to person, place, and time.   Psychiatric:         Behavior: Behavior normal.    "      Thought Content: Thought content normal.         Judgment: Judgment normal.       Derm Physical Exam    Assessment/Plan .    Diagnoses and all orders for this visit:    1. Shortness of breath (Primary)  Comments:  Covid 1/13/2022      2. Upper respiratory tract infection due to COVID-19 virus     overall improving, continue current management. Follow-up for routine health maintenance as directed   Natural course and self-limited nature of this condition discussed.     I wore protective equipment throughout this patient encounter to include mask and gloves. Hand hygiene was performed before donning protective equipment and after removal when leaving the room

## 2022-02-08 DIAGNOSIS — N52.9 ERECTILE DYSFUNCTION, UNSPECIFIED ERECTILE DYSFUNCTION TYPE: Primary | ICD-10-CM

## 2022-02-08 RX ORDER — SILDENAFIL 100 MG/1
TABLET, FILM COATED ORAL
Qty: 8 TABLET | Refills: 12 | Status: SHIPPED | OUTPATIENT
Start: 2022-02-08 | End: 2022-12-07

## 2022-02-25 ENCOUNTER — HOSPITAL ENCOUNTER (EMERGENCY)
Facility: HOSPITAL | Age: 42
Discharge: HOME | End: 2022-02-25
Attending: EMERGENCY MEDICINE | Admitting: EMERGENCY MEDICINE

## 2022-02-25 VITALS
HEART RATE: 114 BPM | WEIGHT: 160 LBS | SYSTOLIC BLOOD PRESSURE: 98 MMHG | OXYGEN SATURATION: 97 % | RESPIRATION RATE: 24 BRPM | DIASTOLIC BLOOD PRESSURE: 63 MMHG | HEIGHT: 72 IN | BODY MASS INDEX: 21.67 KG/M2 | TEMPERATURE: 98.9 F

## 2022-02-25 DIAGNOSIS — Z91.018 ALLERGY TO ALPHA-GAL: ICD-10-CM

## 2022-02-25 DIAGNOSIS — T78.40XA ALLERGIC REACTION, INITIAL ENCOUNTER: Primary | ICD-10-CM

## 2022-02-25 LAB
ALBUMIN SERPL-MCNC: 4.7 G/DL (ref 3.5–5.2)
ALBUMIN/GLOB SERPL: 2.5 G/DL
ALP SERPL-CCNC: 54 U/L (ref 39–117)
ALT SERPL W P-5'-P-CCNC: 27 U/L (ref 1–41)
ANION GAP SERPL CALCULATED.3IONS-SCNC: 15 MMOL/L (ref 5–15)
AST SERPL-CCNC: 21 U/L (ref 1–40)
BASOPHILS # BLD AUTO: 0 10*3/MM3 (ref 0–0.2)
BASOPHILS NFR BLD AUTO: 0.5 % (ref 0–1.5)
BILIRUB SERPL-MCNC: 0.4 MG/DL (ref 0–1.2)
BUN SERPL-MCNC: 13 MG/DL (ref 6–20)
BUN/CREAT SERPL: 8.3 (ref 7–25)
CALCIUM SPEC-SCNC: 9.5 MG/DL (ref 8.6–10.5)
CHLORIDE SERPL-SCNC: 104 MMOL/L (ref 98–107)
CO2 SERPL-SCNC: 22 MMOL/L (ref 22–29)
CREAT SERPL-MCNC: 1.57 MG/DL (ref 0.76–1.27)
DEPRECATED RDW RBC AUTO: 44.6 FL (ref 37–54)
EOSINOPHIL # BLD AUTO: 0 10*3/MM3 (ref 0–0.4)
EOSINOPHIL NFR BLD AUTO: 0.3 % (ref 0.3–6.2)
ERYTHROCYTE [DISTWIDTH] IN BLOOD BY AUTOMATED COUNT: 13.2 % (ref 12.3–15.4)
GFR SERPL CREATININE-BSD FRML MDRD: 49 ML/MIN/1.73
GLOBULIN UR ELPH-MCNC: 1.9 GM/DL
GLUCOSE SERPL-MCNC: 99 MG/DL (ref 65–99)
HCT VFR BLD AUTO: 42.7 % (ref 37.5–51)
HGB BLD-MCNC: 15.2 G/DL (ref 13–17.7)
LYMPHOCYTES # BLD AUTO: 1.4 10*3/MM3 (ref 0.7–3.1)
LYMPHOCYTES NFR BLD AUTO: 14.9 % (ref 19.6–45.3)
MCH RBC QN AUTO: 34.2 PG (ref 26.6–33)
MCHC RBC AUTO-ENTMCNC: 35.5 G/DL (ref 31.5–35.7)
MCV RBC AUTO: 96.3 FL (ref 79–97)
MONOCYTES # BLD AUTO: 0.8 10*3/MM3 (ref 0.1–0.9)
MONOCYTES NFR BLD AUTO: 8.1 % (ref 5–12)
NEUTROPHILS NFR BLD AUTO: 7.3 10*3/MM3 (ref 1.7–7)
NEUTROPHILS NFR BLD AUTO: 76.2 % (ref 42.7–76)
NRBC BLD AUTO-RTO: 0.1 /100 WBC (ref 0–0.2)
PLATELET # BLD AUTO: 288 10*3/MM3 (ref 140–450)
PMV BLD AUTO: 8 FL (ref 6–12)
POTASSIUM SERPL-SCNC: 3.3 MMOL/L (ref 3.5–5.2)
PROT SERPL-MCNC: 6.6 G/DL (ref 6–8.5)
RBC # BLD AUTO: 4.43 10*6/MM3 (ref 4.14–5.8)
SODIUM SERPL-SCNC: 141 MMOL/L (ref 136–145)
WBC NRBC COR # BLD: 9.6 10*3/MM3 (ref 3.4–10.8)

## 2022-02-25 PROCEDURE — 80053 COMPREHEN METABOLIC PANEL: CPT | Performed by: NURSE PRACTITIONER

## 2022-02-25 PROCEDURE — 93005 ELECTROCARDIOGRAM TRACING: CPT | Performed by: NURSE PRACTITIONER

## 2022-02-25 PROCEDURE — 96374 THER/PROPH/DIAG INJ IV PUSH: CPT

## 2022-02-25 PROCEDURE — 25010000002 METHYLPREDNISOLONE PER 125 MG: Performed by: NURSE PRACTITIONER

## 2022-02-25 PROCEDURE — 85025 COMPLETE CBC W/AUTO DIFF WBC: CPT | Performed by: NURSE PRACTITIONER

## 2022-02-25 PROCEDURE — 96375 TX/PRO/DX INJ NEW DRUG ADDON: CPT

## 2022-02-25 PROCEDURE — 99284 EMERGENCY DEPT VISIT MOD MDM: CPT

## 2022-02-25 RX ORDER — IBUPROFEN 600 MG/1
600 TABLET ORAL ONCE
Status: COMPLETED | OUTPATIENT
Start: 2022-02-25 | End: 2022-02-25

## 2022-02-25 RX ORDER — FAMOTIDINE 10 MG/ML
20 INJECTION, SOLUTION INTRAVENOUS ONCE
Status: COMPLETED | OUTPATIENT
Start: 2022-02-25 | End: 2022-02-25

## 2022-02-25 RX ORDER — SODIUM CHLORIDE 0.9 % (FLUSH) 0.9 %
10 SYRINGE (ML) INJECTION AS NEEDED
Status: DISCONTINUED | OUTPATIENT
Start: 2022-02-25 | End: 2022-02-25 | Stop reason: HOSPADM

## 2022-02-25 RX ORDER — METHYLPREDNISOLONE SODIUM SUCCINATE 125 MG/2ML
125 INJECTION, POWDER, LYOPHILIZED, FOR SOLUTION INTRAMUSCULAR; INTRAVENOUS ONCE
Status: COMPLETED | OUTPATIENT
Start: 2022-02-25 | End: 2022-02-25

## 2022-02-25 RX ADMIN — IBUPROFEN 600 MG: 600 TABLET ORAL at 17:27

## 2022-02-25 RX ADMIN — METHYLPREDNISOLONE SODIUM SUCCINATE 125 MG: 125 INJECTION, POWDER, FOR SOLUTION INTRAMUSCULAR; INTRAVENOUS at 15:53

## 2022-02-25 RX ADMIN — FAMOTIDINE 20 MG: 10 INJECTION INTRAVENOUS at 15:53

## 2022-02-27 LAB — QT INTERVAL: 305 MS

## 2022-03-22 ENCOUNTER — PATIENT MESSAGE (OUTPATIENT)
Dept: FAMILY MEDICINE CLINIC | Facility: CLINIC | Age: 42
End: 2022-03-22

## 2022-03-24 ENCOUNTER — PATIENT MESSAGE (OUTPATIENT)
Dept: FAMILY MEDICINE CLINIC | Facility: CLINIC | Age: 42
End: 2022-03-24

## 2022-05-04 ENCOUNTER — APPOINTMENT (OUTPATIENT)
Dept: GENERAL RADIOLOGY | Facility: HOSPITAL | Age: 42
End: 2022-05-04

## 2022-05-04 ENCOUNTER — HOSPITAL ENCOUNTER (EMERGENCY)
Facility: HOSPITAL | Age: 42
Discharge: HOME | End: 2022-05-04
Attending: EMERGENCY MEDICINE | Admitting: EMERGENCY MEDICINE

## 2022-05-04 ENCOUNTER — APPOINTMENT (OUTPATIENT)
Dept: CT IMAGING | Facility: HOSPITAL | Age: 42
End: 2022-05-04

## 2022-05-04 VITALS
DIASTOLIC BLOOD PRESSURE: 86 MMHG | HEART RATE: 109 BPM | BODY MASS INDEX: 23.03 KG/M2 | OXYGEN SATURATION: 98 % | HEIGHT: 72 IN | WEIGHT: 170 LBS | SYSTOLIC BLOOD PRESSURE: 118 MMHG | TEMPERATURE: 98.2 F | RESPIRATION RATE: 15 BRPM

## 2022-05-04 DIAGNOSIS — B34.9 VIRAL ILLNESS: ICD-10-CM

## 2022-05-04 DIAGNOSIS — F41.9 ANXIETY: Primary | ICD-10-CM

## 2022-05-04 DIAGNOSIS — J20.9 ACUTE BRONCHITIS, UNSPECIFIED ORGANISM: ICD-10-CM

## 2022-05-04 LAB
ALBUMIN SERPL-MCNC: 5 G/DL (ref 3.5–5.2)
ALBUMIN/GLOB SERPL: 2.2 G/DL
ALP SERPL-CCNC: 55 U/L (ref 39–117)
ALT SERPL W P-5'-P-CCNC: 23 U/L (ref 1–41)
ANION GAP SERPL CALCULATED.3IONS-SCNC: 17 MMOL/L (ref 5–15)
AST SERPL-CCNC: 22 U/L (ref 1–40)
B PARAPERT DNA SPEC QL NAA+PROBE: NOT DETECTED
B PERT DNA SPEC QL NAA+PROBE: NOT DETECTED
BASOPHILS # BLD AUTO: 0 10*3/MM3 (ref 0–0.2)
BASOPHILS NFR BLD AUTO: 0.6 % (ref 0–1.5)
BILIRUB SERPL-MCNC: 0.4 MG/DL (ref 0–1.2)
BUN SERPL-MCNC: 15 MG/DL (ref 6–20)
BUN/CREAT SERPL: 13.2 (ref 7–25)
C PNEUM DNA NPH QL NAA+NON-PROBE: NOT DETECTED
CALCIUM SPEC-SCNC: 10.1 MG/DL (ref 8.6–10.5)
CHLORIDE SERPL-SCNC: 102 MMOL/L (ref 98–107)
CO2 SERPL-SCNC: 21 MMOL/L (ref 22–29)
CREAT SERPL-MCNC: 1.14 MG/DL (ref 0.76–1.27)
DEPRECATED RDW RBC AUTO: 42.4 FL (ref 37–54)
EGFRCR SERPLBLD CKD-EPI 2021: 83.9 ML/MIN/1.73
EOSINOPHIL # BLD AUTO: 0.1 10*3/MM3 (ref 0–0.4)
EOSINOPHIL NFR BLD AUTO: 1.1 % (ref 0.3–6.2)
ERYTHROCYTE [DISTWIDTH] IN BLOOD BY AUTOMATED COUNT: 12.6 % (ref 12.3–15.4)
FLUAV SUBTYP SPEC NAA+PROBE: NOT DETECTED
FLUBV RNA ISLT QL NAA+PROBE: NOT DETECTED
GLOBULIN UR ELPH-MCNC: 2.3 GM/DL
GLUCOSE SERPL-MCNC: 113 MG/DL (ref 65–99)
HADV DNA SPEC NAA+PROBE: NOT DETECTED
HCOV 229E RNA SPEC QL NAA+PROBE: DETECTED
HCOV HKU1 RNA SPEC QL NAA+PROBE: NOT DETECTED
HCOV NL63 RNA SPEC QL NAA+PROBE: NOT DETECTED
HCOV OC43 RNA SPEC QL NAA+PROBE: NOT DETECTED
HCT VFR BLD AUTO: 44.9 % (ref 37.5–51)
HGB BLD-MCNC: 14.8 G/DL (ref 13–17.7)
HMPV RNA NPH QL NAA+NON-PROBE: NOT DETECTED
HOLD SPECIMEN: NORMAL
HPIV1 RNA ISLT QL NAA+PROBE: NOT DETECTED
HPIV2 RNA SPEC QL NAA+PROBE: NOT DETECTED
HPIV3 RNA NPH QL NAA+PROBE: NOT DETECTED
HPIV4 P GENE NPH QL NAA+PROBE: NOT DETECTED
LYMPHOCYTES # BLD AUTO: 1 10*3/MM3 (ref 0.7–3.1)
LYMPHOCYTES NFR BLD AUTO: 20.3 % (ref 19.6–45.3)
M PNEUMO IGG SER IA-ACNC: NOT DETECTED
MCH RBC QN AUTO: 32.1 PG (ref 26.6–33)
MCHC RBC AUTO-ENTMCNC: 33 G/DL (ref 31.5–35.7)
MCV RBC AUTO: 97.3 FL (ref 79–97)
MONOCYTES # BLD AUTO: 0.6 10*3/MM3 (ref 0.1–0.9)
MONOCYTES NFR BLD AUTO: 11.3 % (ref 5–12)
NEUTROPHILS NFR BLD AUTO: 3.3 10*3/MM3 (ref 1.7–7)
NEUTROPHILS NFR BLD AUTO: 66.7 % (ref 42.7–76)
NRBC BLD AUTO-RTO: 0.1 /100 WBC (ref 0–0.2)
PLATELET # BLD AUTO: 230 10*3/MM3 (ref 140–450)
PMV BLD AUTO: 8.5 FL (ref 6–12)
POTASSIUM SERPL-SCNC: 3.3 MMOL/L (ref 3.5–5.2)
PROT SERPL-MCNC: 7.3 G/DL (ref 6–8.5)
RBC # BLD AUTO: 4.62 10*6/MM3 (ref 4.14–5.8)
RHINOVIRUS RNA SPEC NAA+PROBE: NOT DETECTED
RSV RNA NPH QL NAA+NON-PROBE: NOT DETECTED
SARS-COV-2 RNA NPH QL NAA+NON-PROBE: NOT DETECTED
SODIUM SERPL-SCNC: 140 MMOL/L (ref 136–145)
TROPONIN T SERPL-MCNC: <0.01 NG/ML (ref 0–0.03)
TROPONIN T SERPL-MCNC: <0.01 NG/ML (ref 0–0.03)
WBC NRBC COR # BLD: 4.9 10*3/MM3 (ref 3.4–10.8)

## 2022-05-04 PROCEDURE — 96375 TX/PRO/DX INJ NEW DRUG ADDON: CPT

## 2022-05-04 PROCEDURE — 25010000002 DIPHENHYDRAMINE PER 50 MG: Performed by: PHYSICIAN ASSISTANT

## 2022-05-04 PROCEDURE — 94799 UNLISTED PULMONARY SVC/PX: CPT

## 2022-05-04 PROCEDURE — 84484 ASSAY OF TROPONIN QUANT: CPT | Performed by: PHYSICIAN ASSISTANT

## 2022-05-04 PROCEDURE — 0202U NFCT DS 22 TRGT SARS-COV-2: CPT | Performed by: PHYSICIAN ASSISTANT

## 2022-05-04 PROCEDURE — 93005 ELECTROCARDIOGRAM TRACING: CPT | Performed by: PHYSICIAN ASSISTANT

## 2022-05-04 PROCEDURE — 99284 EMERGENCY DEPT VISIT MOD MDM: CPT

## 2022-05-04 PROCEDURE — 93010 ELECTROCARDIOGRAM REPORT: CPT | Performed by: INTERNAL MEDICINE

## 2022-05-04 PROCEDURE — 94640 AIRWAY INHALATION TREATMENT: CPT

## 2022-05-04 PROCEDURE — 25010000002 METHYLPREDNISOLONE PER 125 MG: Performed by: PHYSICIAN ASSISTANT

## 2022-05-04 PROCEDURE — 96374 THER/PROPH/DIAG INJ IV PUSH: CPT

## 2022-05-04 PROCEDURE — 80053 COMPREHEN METABOLIC PANEL: CPT | Performed by: PHYSICIAN ASSISTANT

## 2022-05-04 PROCEDURE — 70450 CT HEAD/BRAIN W/O DYE: CPT

## 2022-05-04 PROCEDURE — 36415 COLL VENOUS BLD VENIPUNCTURE: CPT

## 2022-05-04 PROCEDURE — 71045 X-RAY EXAM CHEST 1 VIEW: CPT

## 2022-05-04 PROCEDURE — 85025 COMPLETE CBC W/AUTO DIFF WBC: CPT | Performed by: PHYSICIAN ASSISTANT

## 2022-05-04 RX ORDER — FAMOTIDINE 10 MG/ML
20 INJECTION, SOLUTION INTRAVENOUS ONCE
Status: COMPLETED | OUTPATIENT
Start: 2022-05-04 | End: 2022-05-04

## 2022-05-04 RX ORDER — ASPIRIN 81 MG/1
324 TABLET, CHEWABLE ORAL ONCE
Status: COMPLETED | OUTPATIENT
Start: 2022-05-04 | End: 2022-05-04

## 2022-05-04 RX ORDER — DIPHENHYDRAMINE HYDROCHLORIDE 50 MG/ML
25 INJECTION INTRAMUSCULAR; INTRAVENOUS ONCE
Status: COMPLETED | OUTPATIENT
Start: 2022-05-04 | End: 2022-05-04

## 2022-05-04 RX ORDER — GUAIFENESIN 600 MG/1
1200 TABLET, EXTENDED RELEASE ORAL 2 TIMES DAILY PRN
Qty: 40 TABLET | Refills: 0 | Status: SHIPPED | OUTPATIENT
Start: 2022-05-04 | End: 2022-05-14

## 2022-05-04 RX ORDER — METHYLPREDNISOLONE SODIUM SUCCINATE 125 MG/2ML
125 INJECTION, POWDER, LYOPHILIZED, FOR SOLUTION INTRAMUSCULAR; INTRAVENOUS ONCE
Status: COMPLETED | OUTPATIENT
Start: 2022-05-04 | End: 2022-05-04

## 2022-05-04 RX ORDER — ALBUTEROL SULFATE 90 UG/1
2 AEROSOL, METERED RESPIRATORY (INHALATION) ONCE
Status: COMPLETED | OUTPATIENT
Start: 2022-05-04 | End: 2022-05-04

## 2022-05-04 RX ORDER — POTASSIUM CHLORIDE 20 MEQ/1
20 TABLET, EXTENDED RELEASE ORAL ONCE
Status: COMPLETED | OUTPATIENT
Start: 2022-05-04 | End: 2022-05-04

## 2022-05-04 RX ORDER — SODIUM CHLORIDE 0.9 % (FLUSH) 0.9 %
10 SYRINGE (ML) INJECTION AS NEEDED
Status: DISCONTINUED | OUTPATIENT
Start: 2022-05-04 | End: 2022-05-05 | Stop reason: HOSPADM

## 2022-05-04 RX ORDER — EPINEPHRINE 0.3 MG/.3ML
0.3 INJECTION SUBCUTANEOUS ONCE
Qty: 1 EACH | Refills: 0 | Status: SHIPPED | OUTPATIENT
Start: 2022-05-04 | End: 2022-05-04

## 2022-05-04 RX ORDER — BENZONATATE 200 MG/1
200 CAPSULE ORAL 3 TIMES DAILY PRN
Qty: 30 CAPSULE | Refills: 0 | Status: SHIPPED | OUTPATIENT
Start: 2022-05-04 | End: 2022-05-14

## 2022-05-04 RX ADMIN — POTASSIUM CHLORIDE 20 MEQ: 1500 TABLET, EXTENDED RELEASE ORAL at 22:27

## 2022-05-04 RX ADMIN — METHYLPREDNISOLONE SODIUM SUCCINATE 125 MG: 125 INJECTION, POWDER, FOR SOLUTION INTRAMUSCULAR; INTRAVENOUS at 19:30

## 2022-05-04 RX ADMIN — DIPHENHYDRAMINE HYDROCHLORIDE 25 MG: 50 INJECTION, SOLUTION INTRAMUSCULAR; INTRAVENOUS at 19:30

## 2022-05-04 RX ADMIN — ALBUTEROL SULFATE 2 PUFF: 108 INHALANT RESPIRATORY (INHALATION) at 20:57

## 2022-05-04 RX ADMIN — FAMOTIDINE 20 MG: 10 INJECTION INTRAVENOUS at 19:30

## 2022-05-04 RX ADMIN — ASPIRIN 324 MG: 81 TABLET, CHEWABLE ORAL at 20:49

## 2022-05-04 NOTE — ED PROVIDER NOTES
Subjective       Patient is a 41-year-old male comes in complaining of apparent allergic reaction since earlier today.  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  Patient family member at bedside states they were concerned for an allergic reaction as patient was not able to tell them exactly what was going on at that time.  Patient is extremely anxious on exam and states that everything including his chest hurts at this time.  Patient is a poor historian because of this.  Patient has a documented history of bipolar affective disorder and anxiety.  EMS had reported no rashes or signs of stridor however family had given 2 rounds of epinephrine as they had suspected an allergy at that time.  Patient reports that he has had a cough and congestion for the past 3 days that has been mostly nonproductive.  Patient denies any recent nausea, vomiting, diarrhea or abdominal pain.  Patient does report that he had a mechanical fall and injured his head a few days ago but denies any loss of consciousness or profuse vomiting after that time.  Patient reports shortness of breath at this time as well.  Patient states his chest pain shortness of breath is not worse with exertion and better with rest.        Review of Systems   Constitutional: Negative for chills, fatigue and fever.   HENT: Negative for congestion, sore throat, tinnitus and trouble swallowing.    Eyes: Negative for photophobia, discharge and visual disturbance.   Respiratory: Positive for shortness of breath. Negative for cough and wheezing.    Cardiovascular: Positive for chest pain. Negative for palpitations and leg swelling.   Gastrointestinal: Negative for abdominal pain, diarrhea, nausea and vomiting.   Genitourinary: Negative for dysuria, flank pain, frequency and urgency.   Musculoskeletal: Negative for arthralgias and myalgias.   Skin: Negative for rash.   Neurological: Negative  for dizziness, syncope, light-headedness and headaches.   Psychiatric/Behavioral: Negative for confusion.       Past Medical History:   Diagnosis Date   • Acne varioliformis    • Allergic rhinitis    • Anxiety    • Costochondritis 03/04/2013   • Depression    • Gastroesophageal reflux disease with esophagitis    • History of kidney stones    • Migraine with aura    • PUD (peptic ulcer disease)        Allergies   Allergen Reactions   • Bee Venom Anaphylaxis   • Beef-Derived Products Anaphylaxis     Alpha-gal   • Dairy Aid [Lactase] Anaphylaxis   • Latex Anaphylaxis   • Meat Extract Anaphylaxis   • Milk Protein Anaphylaxis     Alpha-Gal   • Other Anaphylaxis     Has Alpha gal so any mammal products   • Pork-Derived Products Anaphylaxis     Alpha-gal     • Zinc Gelatin [Zinc] Anaphylaxis       Past Surgical History:   Procedure Laterality Date   • APPENDECTOMY     • CHOLECYSTECTOMY     • HYDROCELE EXCISION / REPAIR Left        Family History   Problem Relation Age of Onset   • Diabetes Father         passed away due to complications   • Coronary artery disease Father    • Mental illness Neg Hx    • Alcohol abuse Neg Hx    • Drug abuse Neg Hx        Social History     Socioeconomic History   • Marital status:    Tobacco Use   • Smoking status: Never Smoker   • Smokeless tobacco: Never Used   Vaping Use   • Vaping Use: Never used   Substance and Sexual Activity   • Alcohol use: Yes     Alcohol/week: 4.0 standard drinks     Types: 4 Cans of beer per week   • Drug use: Never   • Sexual activity: Defer     Partners: Female           Objective   Physical Exam  Vitals and nursing note reviewed.   Constitutional:       General: He is not in acute distress.     Appearance: He is well-developed. He is not diaphoretic.   HENT:      Head: Normocephalic and atraumatic.      Right Ear: External ear normal.      Left Ear: External ear normal.      Nose: Nose normal.      Mouth/Throat:      Pharynx: No oropharyngeal exudate.  "  Eyes:      Extraocular Movements: Extraocular movements intact.      Conjunctiva/sclera: Conjunctivae normal.      Pupils: Pupils are equal, round, and reactive to light.   Cardiovascular:      Rate and Rhythm: Normal rate and regular rhythm.      Pulses: Normal pulses.      Heart sounds: Normal heart sounds.      Comments: S1, S2 audible.  Pulmonary:      Effort: Pulmonary effort is normal. No respiratory distress.      Breath sounds: Normal breath sounds. No wheezing, rhonchi or rales.      Comments: On room air.  Abdominal:      General: Bowel sounds are normal. There is no distension.      Palpations: Abdomen is soft.      Tenderness: There is no abdominal tenderness. There is no guarding or rebound.   Musculoskeletal:         General: No tenderness or deformity. Normal range of motion.      Cervical back: Normal range of motion.      Right lower leg: No edema.      Left lower leg: No edema.   Skin:     General: Skin is warm.      Capillary Refill: Capillary refill takes less than 2 seconds.      Findings: No erythema or rash.   Neurological:      General: No focal deficit present.      Mental Status: He is alert and oriented to person, place, and time.      Cranial Nerves: No cranial nerve deficit.   Psychiatric:         Behavior: Behavior normal.      Comments: Patient appears extremely anxious on exam.         Procedures           ED Course      /86   Pulse 109   Temp 98.2 °F (36.8 °C)   Resp 15   Ht 182.9 cm (72\")   Wt 77.1 kg (170 lb)   SpO2 98%   BMI 23.06 kg/m²     Labs Reviewed   RESPIRATORY PANEL PCR W/ COVID-19 (SARS-COV-2) SHELLI/MULU/JENNIFER/PAD/COR/MAD/ANGELITA IN-HOUSE, NP SWAB IN UTM/VTP, 3-4 HR TAT - Abnormal; Notable for the following components:       Result Value    Coronavirus 229E Detected (*)     All other components within normal limits    Narrative:     In the setting of a positive respiratory panel with a viral infection PLUS a negative procalcitonin without other underlying concern " for bacterial infection, consider observing off antibiotics or discontinuation of antibiotics and continue supportive care. If the respiratory panel is positive for atypical bacterial infection (Bordetella pertussis, Chlamydophila pneumoniae, or Mycoplasma pneumoniae), consider antibiotic de-escalation to target atypical bacterial infection.   COMPREHENSIVE METABOLIC PANEL - Abnormal; Notable for the following components:    Glucose 113 (*)     Potassium 3.3 (*)     CO2 21.0 (*)     Anion Gap 17.0 (*)     All other components within normal limits    Narrative:     GFR Normal >60  Chronic Kidney Disease <60  Kidney Failure <15     CBC WITH AUTO DIFFERENTIAL - Abnormal; Notable for the following components:    MCV 97.3 (*)     All other components within normal limits   TROPONIN (IN-HOUSE) - Normal    Narrative:     Troponin T Reference Range:  <= 0.03 ng/mL-   Negative for AMI  >0.03 ng/mL-     Abnormal for myocardial necrosis.  Clinicians would have to utilize clinical acumen, EKG, Troponin and serial changes to determine if it is an Acute Myocardial Infarction or myocardial injury due to an underlying chronic condition.       Results may be falsely decreased if patient taking Biotin.     TROPONIN (IN-HOUSE) - Normal    Narrative:     Troponin T Reference Range:  <= 0.03 ng/mL-   Negative for AMI  >0.03 ng/mL-     Abnormal for myocardial necrosis.  Clinicians would have to utilize clinical acumen, EKG, Troponin and serial changes to determine if it is an Acute Myocardial Infarction or myocardial injury due to an underlying chronic condition.       Results may be falsely decreased if patient taking Biotin.     CBC AND DIFFERENTIAL    Narrative:     The following orders were created for panel order CBC & Differential.  Procedure                               Abnormality         Status                     ---------                               -----------         ------                     CBC Auto  "Differential[[id number]]        Abnormal            Final result                 Please view results for these tests on the individual orders.   EXTRA TUBES    Narrative:     The following orders were created for panel order Extra Tubes.  Procedure                               Abnormality         Status                     ---------                               -----------         ------                     Gold Top - SST[[id number]]                                   Final result                 Please view results for these tests on the individual orders.   GOLD TOP - SST     CT Head Without Contrast    Result Date: 5/4/2022  No acute intracranial abnormality. Brain MRI is more sensitive to evaluate for acute or subacute infarcts and to evaluate for intracranial metastatic disease.  Electronically Signed By-Radha Steen MD On:5/4/2022 7:48 PM This report was finalized on [id number] by  Radha Steen MD.    XR Chest 1 View    Result Date: 5/4/2022  No acute cardiopulmonary abnormality.  Electronically Signed By-Radha Steen MD On:5/4/2022 7:37 PM This report was finalized on [id number] by  Radha Steen MD.                                               [facility]       Chart review:   EKG:  EKG reviewed by myself and interpreted by Dr. Paco Esposito, shows sinus tachycardia rate 113 bpm, no ST elevation apparent.    Imaging: CT head unremarkable findings.  Chest x-ray unremarkable.  Labs: CBC unremarkable.  Respiratory viral panel is positive for coronavirus otherwise negative COVID-19.  Initial troponin negative.  Potassium slightly low at 3.3 otherwise unremarkable CMP.    Vitals:  /86   Pulse 109   Temp 98.2 °F (36.8 °C)   Resp 15   Ht 182.9 cm (72\")   Wt 77.1 kg (170 lb)   SpO2 98%   BMI 23.06 kg/m²     Medications given:    Medications   sodium chloride 0.9 % flush 10 mL (has no administration in time range)   aspirin chewable tablet 324 mg (324 mg Oral Given 5/4/22 2049)   famotidine (PEPCID) " injection 20 mg (20 mg Intravenous Given 5/4/22 1930)   methylPREDNISolone sodium succinate (SOLU-Medrol) injection 125 mg (125 mg Intravenous Given 5/4/22 1930)   diphenhydrAMINE (BENADRYL) injection 25 mg (25 mg Intravenous Given 5/4/22 1930)   albuterol sulfate HFA (PROVENTIL HFA;VENTOLIN HFA;PROAIR HFA) inhaler 2 puff (2 puffs Inhalation Given 5/4/22 2057)   potassium chloride (K-DUR,KLOR-CON) CR tablet 20 mEq (20 mEq Oral Given 5/4/22 2227)       Procedures:    MDM: Patient is a 41-year-old male comes in complaining of possible allergic reaction.  Patient denies eating any steak.  CBC unremarkable.  Respiratory viral panel is positive for coronavirus otherwise negative COVID-19.  Initial troponin negative.  Potassium slightly low at 3.3 otherwise unremarkable CMP.  EKG shows sinus tachycardia otherwise no acute findings.  CT head unremarkable findings.  Chest x-ray unremarkable.  Patient was given potassium replacement.  Patient appeared very anxious on exam was given Solu-Medrol, Pepcid and Benadryl as well as albuterol.  Patient was given full dose aspirin for complaint of chest pain per protocol.  On subsequent reevaluation, patient in no acute distress but still appears somewhat anxious.  Patient had no rashes and had largely benign exam.  Family at bedside states that patient has a history of anxiety and is concerned that this may be related to his psych history.  Findings are consistent with anxiety attack as well.  On top of this patient tested positive for coronavirus and likely suffering bronchitis secondary to this given patient's symptoms for the past few days.  Patient family given strict return precautions and voiced understanding.  See full discharge instructions for further details.  Results and plan discussed with patient and is agreeable with plan.    Final diagnoses:   Anxiety   Viral illness   Acute bronchitis, unspecified organism       ED Disposition  ED Disposition     ED Disposition    Discharge    Condition   Stable    Comment   --             [facility] EMERGENCY DEPARTMENT  [address] [address] [phone]  Go in 1 day  As needed, If symptoms worsen    Baldev Sandy MD  [address] [place] [address] DR CHIDI MANN  [place] [address] [phone]    Call in 1 week           Medication List      New Prescriptions    benzonatate 200 MG capsule  Commonly known as: TESSALON  Take 1 capsule by mouth 3 (Three) Times a Day As Needed for Cough for up to 10 days.     guaiFENesin 600 MG 12 hr tablet  Commonly known as: MUCINEX  Take 2 tablets by mouth 2 (Two) Times a Day As Needed for Cough or Congestion for up to 10 days.        Changed    * EPINEPHrine 0.3 MG/0.3ML solution auto-injector injection  Commonly known as: EPIPEN  What changed: Another medication with the same name was added. Make sure you understand how and when to take each.     * EPINEPHrine 0.3 MG/0.3ML solution auto-injector injection  Commonly known as: EPIPEN  Inject 0.3 mL under the skin into the appropriate area as directed 1 (One) Time for 1 dose.  What changed: You were already taking a medication with the same name, and this prescription was added. Make sure you understand how and when to take each.         * This list has 2 medication(s) that are the same as other medications prescribed for you. Read the directions carefully, and ask your doctor or other care provider to review them with you.               Where to Get Your Medications      These medications were sent to [id number] - [place], [place] - [address] AT [facility] [address] - [phone]  - [phone] FX  [address] [place] [address]    Phone: [phone]   · benzonatate 200 MG capsule  · EPINEPHrine 0.3 MG/0.3ML solution auto-injector injection  · guaiFENesin 600 MG 12 hr tablet          Jordi Ortiz PA  05/04/22 2099

## 2022-05-05 NOTE — DISCHARGE INSTRUCTIONS
Please come back to the ER if symptoms recur as needed.  Please follow-up with her primary care provider in 1 week's time.  Please take cough medicine as prescribed.

## 2022-05-19 LAB — QT INTERVAL: 318 MS

## 2022-06-10 ENCOUNTER — APPOINTMENT (OUTPATIENT)
Dept: GENERAL RADIOLOGY | Facility: HOSPITAL | Age: 42
End: 2022-06-10

## 2022-06-10 ENCOUNTER — APPOINTMENT (OUTPATIENT)
Dept: CT IMAGING | Facility: HOSPITAL | Age: 42
End: 2022-06-10

## 2022-06-10 ENCOUNTER — HOSPITAL ENCOUNTER (EMERGENCY)
Facility: HOSPITAL | Age: 42
Discharge: HOME | End: 2022-06-11
Attending: EMERGENCY MEDICINE | Admitting: EMERGENCY MEDICINE

## 2022-06-10 DIAGNOSIS — Z79.899 POLYPHARMACY: ICD-10-CM

## 2022-06-10 DIAGNOSIS — F10.920 ALCOHOLIC INTOXICATION WITHOUT COMPLICATION: ICD-10-CM

## 2022-06-10 DIAGNOSIS — Z86.59 MENTAL STATUS CHANGE RESOLVED: Primary | ICD-10-CM

## 2022-06-10 LAB
ALBUMIN SERPL-MCNC: 5 G/DL (ref 3.5–5.2)
ALBUMIN/GLOB SERPL: 3.3 G/DL
ALP SERPL-CCNC: 47 U/L (ref 39–117)
ALT SERPL W P-5'-P-CCNC: 24 U/L (ref 1–41)
AMPHET+METHAMPHET UR QL: NEGATIVE
ANION GAP SERPL CALCULATED.3IONS-SCNC: 19 MMOL/L (ref 5–15)
ARTERIAL PATENCY WRIST A: POSITIVE
AST SERPL-CCNC: 25 U/L (ref 1–40)
ATMOSPHERIC PRESS: ABNORMAL MM[HG]
BARBITURATES UR QL SCN: NEGATIVE
BASE EXCESS BLDA CALC-SCNC: -3.2 MMOL/L (ref 0–3)
BASOPHILS # BLD AUTO: 0 10*3/MM3 (ref 0–0.2)
BASOPHILS NFR BLD AUTO: 0.9 % (ref 0–1.5)
BDY SITE: ABNORMAL
BENZODIAZ UR QL SCN: NEGATIVE
BILIRUB SERPL-MCNC: 0.6 MG/DL (ref 0–1.2)
BUN SERPL-MCNC: 14 MG/DL (ref 6–20)
BUN/CREAT SERPL: 12.1 (ref 7–25)
CALCIUM SPEC-SCNC: 9.6 MG/DL (ref 8.6–10.5)
CANNABINOIDS SERPL QL: NEGATIVE
CHLORIDE SERPL-SCNC: 103 MMOL/L (ref 98–107)
CO2 BLDA-SCNC: 21.9 MMOL/L (ref 22–29)
CO2 SERPL-SCNC: 18 MMOL/L (ref 22–29)
COCAINE UR QL: NEGATIVE
CREAT SERPL-MCNC: 1.16 MG/DL (ref 0.76–1.27)
DEPRECATED RDW RBC AUTO: 41.1 FL (ref 37–54)
EGFRCR SERPLBLD CKD-EPI 2021: 82.2 ML/MIN/1.73
EOSINOPHIL # BLD AUTO: 0.1 10*3/MM3 (ref 0–0.4)
EOSINOPHIL NFR BLD AUTO: 2.2 % (ref 0.3–6.2)
ERYTHROCYTE [DISTWIDTH] IN BLOOD BY AUTOMATED COUNT: 12.4 % (ref 12.3–15.4)
ETHANOL UR QL: 0.16 %
GLOBULIN UR ELPH-MCNC: 1.5 GM/DL
GLUCOSE SERPL-MCNC: 78 MG/DL (ref 65–99)
HCO3 BLDA-SCNC: 20.9 MMOL/L (ref 21–28)
HCT VFR BLD AUTO: 44 % (ref 37.5–51)
HEMODILUTION: NO
HGB BLD-MCNC: 15.2 G/DL (ref 13–17.7)
INHALED O2 CONCENTRATION: 21 %
LYMPHOCYTES # BLD AUTO: 1.4 10*3/MM3 (ref 0.7–3.1)
LYMPHOCYTES NFR BLD AUTO: 26.2 % (ref 19.6–45.3)
MCH RBC QN AUTO: 33.1 PG (ref 26.6–33)
MCHC RBC AUTO-ENTMCNC: 34.6 G/DL (ref 31.5–35.7)
MCV RBC AUTO: 95.6 FL (ref 79–97)
METHADONE UR QL SCN: NEGATIVE
MODALITY: ABNORMAL
MONOCYTES # BLD AUTO: 0.6 10*3/MM3 (ref 0.1–0.9)
MONOCYTES NFR BLD AUTO: 10.7 % (ref 5–12)
NEUTROPHILS NFR BLD AUTO: 3.2 10*3/MM3 (ref 1.7–7)
NEUTROPHILS NFR BLD AUTO: 60 % (ref 42.7–76)
NRBC BLD AUTO-RTO: 0 /100 WBC (ref 0–0.2)
OPIATES UR QL: NEGATIVE
OXYCODONE UR QL SCN: NEGATIVE
PCO2 BLDA: 34.1 MM HG (ref 35–48)
PH BLDA: 7.39 PH UNITS (ref 7.35–7.45)
PLATELET # BLD AUTO: 230 10*3/MM3 (ref 140–450)
PMV BLD AUTO: 8.1 FL (ref 6–12)
PO2 BLDA: 90.4 MM HG (ref 83–108)
POTASSIUM SERPL-SCNC: 4.1 MMOL/L (ref 3.5–5.2)
PROT SERPL-MCNC: 6.5 G/DL (ref 6–8.5)
RBC # BLD AUTO: 4.6 10*6/MM3 (ref 4.14–5.8)
SAO2 % BLDCOA: 97 % (ref 94–98)
SODIUM SERPL-SCNC: 140 MMOL/L (ref 136–145)
TOTAL RATE: 20 BREATHS/MINUTE
WBC NRBC COR # BLD: 5.3 10*3/MM3 (ref 3.4–10.8)

## 2022-06-10 PROCEDURE — 80307 DRUG TEST PRSMV CHEM ANLYZR: CPT | Performed by: EMERGENCY MEDICINE

## 2022-06-10 PROCEDURE — 70450 CT HEAD/BRAIN W/O DYE: CPT

## 2022-06-10 PROCEDURE — 85025 COMPLETE CBC W/AUTO DIFF WBC: CPT | Performed by: EMERGENCY MEDICINE

## 2022-06-10 PROCEDURE — 82077 ASSAY SPEC XCP UR&BREATH IA: CPT | Performed by: EMERGENCY MEDICINE

## 2022-06-10 PROCEDURE — 96374 THER/PROPH/DIAG INJ IV PUSH: CPT

## 2022-06-10 PROCEDURE — 80053 COMPREHEN METABOLIC PANEL: CPT | Performed by: EMERGENCY MEDICINE

## 2022-06-10 PROCEDURE — 82803 BLOOD GASES ANY COMBINATION: CPT

## 2022-06-10 PROCEDURE — 93005 ELECTROCARDIOGRAM TRACING: CPT

## 2022-06-10 PROCEDURE — 36415 COLL VENOUS BLD VENIPUNCTURE: CPT

## 2022-06-10 PROCEDURE — 99284 EMERGENCY DEPT VISIT MOD MDM: CPT

## 2022-06-10 PROCEDURE — 25010000002 THIAMINE PER 100 MG: Performed by: EMERGENCY MEDICINE

## 2022-06-10 PROCEDURE — 36600 WITHDRAWAL OF ARTERIAL BLOOD: CPT

## 2022-06-10 PROCEDURE — 71045 X-RAY EXAM CHEST 1 VIEW: CPT

## 2022-06-10 RX ORDER — SODIUM CHLORIDE, SODIUM LACTATE, POTASSIUM CHLORIDE, CALCIUM CHLORIDE 600; 310; 30; 20 MG/100ML; MG/100ML; MG/100ML; MG/100ML
125 INJECTION, SOLUTION INTRAVENOUS CONTINUOUS
Status: DISCONTINUED | OUTPATIENT
Start: 2022-06-10 | End: 2022-06-11 | Stop reason: HOSPADM

## 2022-06-10 RX ORDER — THIAMINE HYDROCHLORIDE 100 MG/ML
100 INJECTION, SOLUTION INTRAMUSCULAR; INTRAVENOUS ONCE
Status: COMPLETED | OUTPATIENT
Start: 2022-06-10 | End: 2022-06-10

## 2022-06-10 RX ADMIN — THIAMINE HYDROCHLORIDE 100 MG: 100 INJECTION, SOLUTION INTRAMUSCULAR; INTRAVENOUS at 23:56

## 2022-06-10 RX ADMIN — SODIUM CHLORIDE, POTASSIUM CHLORIDE, SODIUM LACTATE AND CALCIUM CHLORIDE 500 ML: 600; 310; 30; 20 INJECTION, SOLUTION INTRAVENOUS at 23:54

## 2022-06-10 NOTE — ED PROVIDER NOTES
Subjective   History of Present Illness  41-year-old male presents after he reportedly had a very stressful day at work.  He has history of anxiety panic attacks bipolar disorder.  Wife reports he is compliant with his medication.  She gave him his prescribed Ativan tonight.  She states he was in the shower when she heard him bang on the door.  She states she was still standing he had not fallen.  She put him to bed.  He would have episodes where he would gasp for breath.  Review of Systems  Unable to obtain due to current mental status  Past Medical History:   Diagnosis Date   • Acne varioliformis    • Allergic rhinitis    • Anxiety    • Costochondritis 03/04/2013   • Depression    • Gastroesophageal reflux disease with esophagitis    • History of kidney stones    • Migraine with aura    • PUD (peptic ulcer disease)        Allergies   Allergen Reactions   • Bee Venom Anaphylaxis   • Beef-Derived Products Anaphylaxis     Alpha-gal   • Dairy Aid [Lactase] Anaphylaxis   • Latex Anaphylaxis   • Meat Extract Anaphylaxis   • Milk Protein Anaphylaxis     Alpha-Gal   • Other Anaphylaxis     Has Alpha gal so any mammal products   • Pork-Derived Products Anaphylaxis     Alpha-gal     • Zinc Gelatin [Zinc] Anaphylaxis       Past Surgical History:   Procedure Laterality Date   • APPENDECTOMY     • CHOLECYSTECTOMY     • HYDROCELE EXCISION / REPAIR Left        Family History   Problem Relation Age of Onset   • Diabetes Father         passed away due to complications   • Coronary artery disease Father    • Mental illness Neg Hx    • Alcohol abuse Neg Hx    • Drug abuse Neg Hx        Social History     Socioeconomic History   • Marital status:    Tobacco Use   • Smoking status: Never Smoker   • Smokeless tobacco: Never Used   Vaping Use   • Vaping Use: Never used   Substance and Sexual Activity   • Alcohol use: Yes     Alcohol/week: 4.0 standard drinks     Types: 4 Cans of beer per week   • Drug use: Never   • Sexual  activity: Defer     Partners: Female     Prior to Admission medications    Medication Sig Start Date End Date Taking? Authorizing Provider   cetirizine (zyrTEC) 10 MG tablet Take 10 mg by mouth Daily.    Sarah Real MD   cholecalciferol (VITAMIN D3) 25 MCG (1000 UT) tablet Take 1,000 Units by mouth Daily. vegan    Sarah Real MD   EPINEPHrine (EPIPEN) 0.3 MG/0.3ML solution auto-injector injection Inject 0.3 mg into the appropriate muscle as directed by prescriber. 10/6/17   Emergency, Nurse Janiya, RN   famotidine (PEPCID) 40 MG tablet Take 40 mg by mouth 2 (two) times a day.    Sarah Real MD   lamoTRIgine (LaMICtal) 200 MG tablet Take 200 mg by mouth Every Morning.    Sarah Real MD   levalbuterol (XOPENEX HFA) 45 MCG/ACT inhaler Inhale 1-2 puffs Every 4 (Four) Hours As Needed for Wheezing. 8/4/21   Sarbjit Goyal MD   LORazepam (ATIVAN) 1 MG tablet Take 1 mg by mouth 2 (Two) Times a Day As Needed for Anxiety.    Sarah Real MD   multivitamin with minerals tablet tablet Take 1 tablet by mouth Daily.    Sarah Real MD   QUEtiapine (SEROquel) 100 MG tablet Take 100 mg by mouth Every Morning.    Sarah Real MD   QUEtiapine (SEROquel) 400 MG tablet Take 400 mg by mouth Every Night.    Sarah Real MD   sildenafil (Viagra) 100 MG tablet 1/2 to 1 tablet as needed 2/8/22   Baldev Sandy MD   venlafaxine 225 MG tablet sustained-release 24 hour 24 hr tablet Take 225 mg by mouth Every Morning.    Sarah Real MD           Objective   Physical Exam  41-year-old male obtunded but arousable.  Pupils equal round react to light.  There is no facial asymmetry.  Neck supple chest clear equal breath sounds cardiovascular regular rhythm abdomen soft nontender.  Neurologic exam he is obtunded but arousable.  Motor or sensory grossly intact to extremities reflexes 1+ symmetric.  Skin without rash noted.  Procedures           ED  Course      Results for orders placed or performed during the hospital encounter of 06/10/22   Comprehensive Metabolic Panel    Specimen: Blood   Result Value Ref Range    Glucose 78 65 - 99 mg/dL    BUN 14 6 - 20 mg/dL    Creatinine 1.16 0.76 - 1.27 mg/dL    Sodium 140 136 - 145 mmol/L    Potassium 4.1 3.5 - 5.2 mmol/L    Chloride 103 98 - 107 mmol/L    CO2 18.0 (L) 22.0 - 29.0 mmol/L    Calcium 9.6 8.6 - 10.5 mg/dL    Total Protein 6.5 6.0 - 8.5 g/dL    Albumin 5.00 3.50 - 5.20 g/dL    ALT (SGPT) 24 1 - 41 U/L    AST (SGOT) 25 1 - 40 U/L    Alkaline Phosphatase 47 39 - 117 U/L    Total Bilirubin 0.6 0.0 - 1.2 mg/dL    Globulin 1.5 gm/dL    A/G Ratio 3.3 g/dL    BUN/Creatinine Ratio 12.1 7.0 - 25.0    Anion Gap 19.0 (H) 5.0 - 15.0 mmol/L    eGFR 82.2 >60.0 mL/min/1.73   Urine Drug Screen - Urine, Clean Catch    Specimen: Urine, Clean Catch   Result Value Ref Range    Amphet/Methamphet, Screen Negative Negative    Barbiturates Screen, Urine Negative Negative    Benzodiazepine Screen, Urine Negative Negative    Cocaine Screen, Urine Negative Negative    Opiate Screen Negative Negative    THC, Screen, Urine Negative Negative    Methadone Screen, Urine Negative Negative    Oxycodone Screen, Urine Negative Negative   Ethanol    Specimen: Blood   Result Value Ref Range    Ethanol % 0.164 %   CBC Auto Differential    Specimen: Blood   Result Value Ref Range    WBC 5.30 3.40 - 10.80 10*3/mm3    RBC 4.60 4.14 - 5.80 10*6/mm3    Hemoglobin 15.2 13.0 - 17.7 g/dL    Hematocrit 44.0 37.5 - 51.0 %    MCV 95.6 79.0 - 97.0 fL    MCH 33.1 (H) 26.6 - 33.0 pg    MCHC 34.6 31.5 - 35.7 g/dL    RDW 12.4 12.3 - 15.4 %    RDW-SD 41.1 37.0 - 54.0 fl    MPV 8.1 6.0 - 12.0 fL    Platelets 230 140 - 450 10*3/mm3    Neutrophil % 60.0 42.7 - 76.0 %    Lymphocyte % 26.2 19.6 - 45.3 %    Monocyte % 10.7 5.0 - 12.0 %    Eosinophil % 2.2 0.3 - 6.2 %    Basophil % 0.9 0.0 - 1.5 %    Neutrophils, Absolute 3.20 1.70 - 7.00 10*3/mm3    Lymphocytes,  "Absolute 1.40 0.70 - 3.10 10*3/mm3    Monocytes, Absolute 0.60 0.10 - 0.90 10*3/mm3    Eosinophils, Absolute 0.10 0.00 - 0.40 10*3/mm3    Basophils, Absolute 0.00 0.00 - 0.20 10*3/mm3    nRBC 0.0 0.0 - 0.2 /100 WBC   Blood Gas, Arterial -    Specimen: Arterial Blood   Result Value Ref Range    Site Left Radial     Rahat's Test Positive     pH, Arterial 7.394 7.350 - 7.450 pH units    pCO2, Arterial 34.1 (L) 35.0 - 48.0 mm Hg    pO2, Arterial 90.4 83.0 - 108.0 mm Hg    HCO3, Arterial 20.9 (L) 21.0 - 28.0 mmol/L    Base Excess, Arterial -3.2 (L) 0.0 - 3.0 mmol/L    O2 Saturation, Arterial 97.0 94.0 - 98.0 %    CO2 Content 21.9 (L) 22 - 29 mmol/L    Barometric Pressure for Blood Gas      Modality Room Air     FIO2 21 %    Rate 20.0000 Breaths/minute    Hemodilution No    ECG 12 Lead   Result Value Ref Range    QT Interval 315 ms     CT Head Without Contrast    Result Date: 6/10/2022  No acute findings.  Electronically Signed By-Dread Donaldson MD On:6/10/2022 8:30 PM This report was finalized on [id number] by  Dread Donaldson MD.    XR Chest 1 View    Result Date: 6/10/2022  Mild bilateral basilar subsegmental atelectasis.  Electronically Signed By-Dread Donaldson MD On:6/10/2022 8:44 PM This report was finalized on [id number] by  Dread Donaldson MD.    Medications   lactated ringers infusion (has no administration in time range)   lactated ringers bolus 500 mL (500 mL Intravenous New Bag 6/10/22 2354)   thiamine (B-1) injection 100 mg (100 mg Intravenous Given 6/10/22 2356)     /66   Pulse 80   Temp 97.1 °F (36.2 °C) (Oral)   Resp 18   Ht 182.9 cm (72\")   Wt 79.4 kg (175 lb)   SpO2 98%   BMI 23.73 kg/m²                                              MDM  Chart review: Patient's most recent evaluation last month was for anxiety and bronchitis.  He has been seen for allergic reactions previously.  Comorbidity: As per past history  Differential: Overdose, intoxication, intracranial abnormality, respiratory failure  My " EKG interpretation: Sinus tachycardia rate of 114.  Compared to previous no significant change  Lab: Comprehensive metabolic panel remarkable for CO2 of 18 ethanol elevated 0.164 arterial blood gas room air pH 7.39 PCO2 34 PO2 90 urine DOA negative CBC normal  Radiology: I reviewed all x-rays.  Chest x-ray reveals some mild bilateral basilar subsegmental atelectasis.  CT scan of head no acute intracranial abnormality.  There is some mild mucosal thickening in the inferior maxillary sinuses.  Discussion/treatment: Patient had IV placed.  Start IV fluids.  Findings were discussed with patient's wife.  She reports he has had problems with alcohol.  Repeat evaluation patient is still somnolent but more arousable.  Final evaluation patient is awake he is speaking coherently.  It was learned that he also had taken his Seroquel earlier tonight.  Findings were discussed with him and his wife.  He was encouraged to seek treatment for his alcohol use.  To follow-up with his primary provider.  Return new or worsening symptoms.  Patient was evaluated using appropriate PPE      Final diagnoses:   Mental status change resolved   Polypharmacy   Alcoholic intoxication without complication ([facility])       ED Disposition  ED Disposition     ED Disposition   Discharge    Condition   Stable    Comment   --             Baldev Sandy MD  [address] [place] [address] DR MURILLO  [address] [place] [facility]  [phone]               Medication List      No changes were made to your prescriptions during this visit.          Kevin Dillard MD  06/11/22 0011

## 2022-06-11 VITALS
HEART RATE: 86 BPM | OXYGEN SATURATION: 98 % | WEIGHT: 175 LBS | TEMPERATURE: 98 F | SYSTOLIC BLOOD PRESSURE: 106 MMHG | HEIGHT: 72 IN | BODY MASS INDEX: 23.7 KG/M2 | DIASTOLIC BLOOD PRESSURE: 86 MMHG | RESPIRATION RATE: 14 BRPM

## 2022-06-11 NOTE — DISCHARGE INSTRUCTIONS
Follow-up with primary provider for repeat evaluation.  You should seek treatment for your alcohol use.  Off work today

## 2022-07-17 LAB — QT INTERVAL: 315 MS

## 2022-09-07 ENCOUNTER — HOSPITAL ENCOUNTER (EMERGENCY)
Facility: HOSPITAL | Age: 42
Discharge: HOME | End: 2022-09-07
Attending: EMERGENCY MEDICINE | Admitting: EMERGENCY MEDICINE

## 2022-09-07 VITALS
HEIGHT: 72 IN | BODY MASS INDEX: 22.72 KG/M2 | TEMPERATURE: 98 F | DIASTOLIC BLOOD PRESSURE: 89 MMHG | SYSTOLIC BLOOD PRESSURE: 120 MMHG | HEART RATE: 109 BPM | RESPIRATION RATE: 16 BRPM | OXYGEN SATURATION: 96 % | WEIGHT: 167.77 LBS

## 2022-09-07 DIAGNOSIS — T78.40XA ALLERGIC REACTION, INITIAL ENCOUNTER: Primary | ICD-10-CM

## 2022-09-07 PROCEDURE — 96375 TX/PRO/DX INJ NEW DRUG ADDON: CPT

## 2022-09-07 PROCEDURE — 25010000002 MORPHINE PER 10 MG: Performed by: EMERGENCY MEDICINE

## 2022-09-07 PROCEDURE — 25010000002 DIPHENHYDRAMINE PER 50 MG: Performed by: EMERGENCY MEDICINE

## 2022-09-07 PROCEDURE — 99284 EMERGENCY DEPT VISIT MOD MDM: CPT

## 2022-09-07 PROCEDURE — 25010000002 METHYLPREDNISOLONE PER 125 MG: Performed by: EMERGENCY MEDICINE

## 2022-09-07 PROCEDURE — 96374 THER/PROPH/DIAG INJ IV PUSH: CPT

## 2022-09-07 PROCEDURE — 96372 THER/PROPH/DIAG INJ SC/IM: CPT

## 2022-09-07 PROCEDURE — 25010000002 EPINEPHRINE PER 0.1 MG: Performed by: EMERGENCY MEDICINE

## 2022-09-07 RX ORDER — MORPHINE SULFATE 2 MG/ML
2 INJECTION, SOLUTION INTRAMUSCULAR; INTRAVENOUS ONCE
Status: COMPLETED | OUTPATIENT
Start: 2022-09-07 | End: 2022-09-07

## 2022-09-07 RX ORDER — DIPHENHYDRAMINE HYDROCHLORIDE 50 MG/ML
25 INJECTION INTRAMUSCULAR; INTRAVENOUS ONCE
Status: COMPLETED | OUTPATIENT
Start: 2022-09-07 | End: 2022-09-07

## 2022-09-07 RX ORDER — SODIUM CHLORIDE 0.9 % (FLUSH) 0.9 %
10 SYRINGE (ML) INJECTION AS NEEDED
Status: DISCONTINUED | OUTPATIENT
Start: 2022-09-07 | End: 2022-09-08 | Stop reason: HOSPADM

## 2022-09-07 RX ORDER — PREDNISONE 20 MG/1
20 TABLET ORAL DAILY
Qty: 5 TABLET | Refills: 0 | Status: SHIPPED | OUTPATIENT
Start: 2022-09-07 | End: 2022-12-07

## 2022-09-07 RX ORDER — EPINEPHRINE 1 MG/ML
0.3 INJECTION, SOLUTION, CONCENTRATE INTRAVENOUS ONCE
Status: COMPLETED | OUTPATIENT
Start: 2022-09-07 | End: 2022-09-07

## 2022-09-07 RX ORDER — METHYLPREDNISOLONE SODIUM SUCCINATE 125 MG/2ML
125 INJECTION, POWDER, LYOPHILIZED, FOR SOLUTION INTRAMUSCULAR; INTRAVENOUS ONCE
Status: COMPLETED | OUTPATIENT
Start: 2022-09-07 | End: 2022-09-07

## 2022-09-07 RX ORDER — EPINEPHRINE 0.3 MG/.3ML
0.3 INJECTION SUBCUTANEOUS ONCE
Qty: 1 EACH | Refills: 0 | Status: SHIPPED | OUTPATIENT
Start: 2022-09-07 | End: 2022-09-07

## 2022-09-07 RX ADMIN — MORPHINE SULFATE 2 MG: 2 INJECTION, SOLUTION INTRAMUSCULAR; INTRAVENOUS at 22:16

## 2022-09-07 RX ADMIN — EPINEPHRINE 0.3 MG: 1 INJECTION, SOLUTION, CONCENTRATE INTRAVENOUS at 21:15

## 2022-09-07 RX ADMIN — DIPHENHYDRAMINE HYDROCHLORIDE 25 MG: 50 INJECTION INTRAMUSCULAR; INTRAVENOUS at 21:12

## 2022-09-07 RX ADMIN — METHYLPREDNISOLONE SODIUM SUCCINATE 125 MG: 125 INJECTION, POWDER, FOR SOLUTION INTRAMUSCULAR; INTRAVENOUS at 21:12

## 2022-09-08 NOTE — ED PROVIDER NOTES
Subjective   PIT    Patient is a 41-year-old male who states he has an alpha gal allergy.  He did eat some dinner tonight and developed a rash with some difficulty breathing and achiness.  This is typical for his allergic reactions.  Patient denies other complaints          Review of Systems  Negative for headache earache sore throat cough fever chest pain shortness of breath abdominal pain Bryan diarrhea dysuria positive achiness negative for other complaint.  A complete view system was obtained and is otherwise negative  Past Medical History:   Diagnosis Date   • Acne varioliformis    • Allergic rhinitis    • Anxiety    • Costochondritis 03/04/2013   • Depression    • Gastroesophageal reflux disease with esophagitis    • History of kidney stones    • Migraine with aura    • PUD (peptic ulcer disease)        Allergies   Allergen Reactions   • Bee Venom Anaphylaxis   • Beef-Derived Products Anaphylaxis     Alpha-gal   • Dairy Aid [Lactase] Anaphylaxis   • Latex Anaphylaxis   • Meat Extract Anaphylaxis   • Milk Protein Anaphylaxis     Alpha-Gal   • Other Anaphylaxis     Has Alpha gal so any mammal products   • Pork-Derived Products Anaphylaxis     Alpha-gal     • Zinc Gelatin [Zinc] Anaphylaxis       Past Surgical History:   Procedure Laterality Date   • APPENDECTOMY     • CHOLECYSTECTOMY     • HYDROCELE EXCISION / REPAIR Left        Family History   Problem Relation Age of Onset   • Diabetes Father         passed away due to complications   • Coronary artery disease Father    • Mental illness Neg Hx    • Alcohol abuse Neg Hx    • Drug abuse Neg Hx        Social History     Socioeconomic History   • Marital status:    Tobacco Use   • Smoking status: Never Smoker   • Smokeless tobacco: Never Used   Vaping Use   • Vaping Use: Never used   Substance and Sexual Activity   • Alcohol use: Yes     Alcohol/week: 4.0 standard drinks     Types: 4 Cans of beer per week   • Drug use: Never   • Sexual activity: Defer      Partners: Female           Objective   Physical Exam   HEENT exam shows TMs to be clear.  Oropharynx comers but sclera is nonicteric.  Neck has no stridor or adenopathy.  Lungs are clear.  Heart has regular rate rhythm without murmur.  Chest nontender.  Ab soft nontender.  Patient has normal bowel sounds.  EXTR exam shows pain of hives body wide  Procedures           ED Course                                           MDM  Number of Diagnoses or Management Options  Diagnosis management comments: Patient was given epinephrine subcu as well as sign Medrol and Benadryl IV.  Patient feels improvement.  Will be discharged and will placed on prednisone.  Will follow his MD for further outpatient evaluation as needed.    Risk of Complications, Morbidity, and/or Mortality  Presenting problems: high  Diagnostic procedures: high  Management options: high    Patient Progress  Patient progress: stable      Final diagnoses:   Allergic reaction, initial encounter       ED Disposition  ED Disposition     ED Disposition   Discharge    Condition   Stable    Comment   --             No follow-up provider specified.       Medication List      New Prescriptions    predniSONE 20 MG tablet  Commonly known as: DELTASONE  Take 1 tablet by mouth Daily.        Changed    * EPINEPHrine 0.3 MG/0.3ML solution auto-injector injection  Commonly known as: EPIPEN  What changed: Another medication with the same name was added. Make sure you understand how and when to take each.     * EPINEPHrine 0.3 MG/0.3ML solution auto-injector injection  Commonly known as: EPIPEN  Inject 0.3 mL under the skin into the appropriate area as directed 1 (One) Time for 1 dose.  What changed: You were already taking a medication with the same name, and this prescription was added. Make sure you understand how and when to take each.         * This list has 2 medication(s) that are the same as other medications prescribed for you. Read the directions carefully, and ask  your doctor or other care provider to review them with you.               Where to Get Your Medications      Information about where to get these medications is not yet available    Ask your nurse or doctor about these medications  · EPINEPHrine 0.3 MG/0.3ML solution auto-injector injection  · predniSONE 20 MG tablet          Krish Garrett MD  09/07/22 5900

## 2022-12-07 ENCOUNTER — OFFICE VISIT (OUTPATIENT)
Dept: FAMILY MEDICINE CLINIC | Facility: CLINIC | Age: 42
End: 2022-12-07

## 2022-12-07 VITALS
OXYGEN SATURATION: 97 % | SYSTOLIC BLOOD PRESSURE: 124 MMHG | TEMPERATURE: 98.2 F | DIASTOLIC BLOOD PRESSURE: 76 MMHG | BODY MASS INDEX: 23.48 KG/M2 | RESPIRATION RATE: 18 BRPM | HEIGHT: 72 IN | WEIGHT: 173.38 LBS | HEART RATE: 139 BPM

## 2022-12-07 DIAGNOSIS — Z02.6 ENCOUNTER RELATED TO WORKER'S COMPENSATION CLAIM: Primary | ICD-10-CM

## 2022-12-07 DIAGNOSIS — S49.90XA SHOULDER INJURY, INITIAL ENCOUNTER: ICD-10-CM

## 2022-12-07 DIAGNOSIS — M25.60 LIMITED JOINT RANGE OF MOTION (ROM): ICD-10-CM

## 2022-12-07 DIAGNOSIS — M25.511 ARTHRALGIA OF RIGHT ACROMIOCLAVICULAR JOINT: ICD-10-CM

## 2022-12-07 PROCEDURE — 99214 OFFICE O/P EST MOD 30 MIN: CPT | Performed by: STUDENT IN AN ORGANIZED HEALTH CARE EDUCATION/TRAINING PROGRAM

## 2022-12-07 RX ORDER — MELOXICAM 7.5 MG/1
7.5 TABLET ORAL 2 TIMES DAILY PRN
Qty: 28 TABLET | Refills: 0 | Status: SHIPPED | OUTPATIENT
Start: 2022-12-07 | End: 2022-12-21

## 2022-12-07 RX ORDER — METHYLPREDNISOLONE 4 MG/1
TABLET ORAL
Qty: 21 TABLET | Refills: 0 | Status: SHIPPED | OUTPATIENT
Start: 2022-12-07 | End: 2022-12-28

## 2022-12-07 RX ORDER — HYDROCODONE BITARTRATE AND ACETAMINOPHEN 5; 325 MG/1; MG/1
1 TABLET ORAL EVERY 6 HOURS PRN
COMMUNITY
Start: 2022-12-06 | End: 2022-12-28

## 2022-12-07 NOTE — PROGRESS NOTES
Subjective   Warren Giraldo is a 41 y.o. male.     Chief Complaint   Patient presents with   • Shoulder Injury       History of Present Illness   Patient is present today to be seen for shoulder injury that occurred while at work.   Injury occurred on 11/29/2022.  Patient states that he was lifting ladder off of ladder rack on work vehicle when he heard a pop, then pain after.  Had immediate pain 8/10 with shooting pains down into right hand. Took 2 ibuprofen. Patient states pain is worse to right shoulder and experiencing shooting pains down his arm, reporting shoulder is 50% worse than initial injury.   Patient was referred to ortho surgery from [facility].     The following portions of the patient's history were reviewed and updated as appropriate: allergies, current medications, past family history, past medical history, past social history, past surgical history and problem list.    Allergies:  Allergies   Allergen Reactions   • Bee Venom Anaphylaxis   • Beef-Derived Products Anaphylaxis     Alpha-gal   • Dairy Aid [Tilactase] Anaphylaxis   • Latex Anaphylaxis   • Meat Extract Anaphylaxis   • Milk Protein Anaphylaxis     Alpha-Gal   • Other Anaphylaxis     Has Alpha gal so any mammal products   • Pork-Derived Products Anaphylaxis     Alpha-gal     • Zinc Gelatin [Zinc] Anaphylaxis       Social History:  Social History     Socioeconomic History   • Marital status:    Tobacco Use   • Smoking status: Never   • Smokeless tobacco: Never   Vaping Use   • Vaping Use: Never used   Substance and Sexual Activity   • Alcohol use: Yes     Alcohol/week: 4.0 standard drinks     Types: 4 Cans of beer per week   • Drug use: Never   • Sexual activity: Defer     Partners: Female       Family History:  Family History   Problem Relation Age of Onset   • Diabetes Father         passed away due to complications   • Coronary artery disease Father    • Mental illness Neg Hx    • Alcohol abuse Neg  Hx    • Drug abuse Neg Hx        Past Medical History :  Patient Active Problem List   Diagnosis   • Acne varioliformis   • Acute stress disorder   • Allergic rhinitis   • Anxiety   • Classical migraine   • Costochondritis   • Gastroesophageal reflux disease with esophagitis   • PUD (peptic ulcer disease)   • Anaphylaxis due to food   • Allergic reaction to alpha-gal   • Bipolar affective disorder, depressed (HCC)   • Tick bite   • Shortness of breath   • Encounter related to worker's compensation claim   • Shoulder injury, initial encounter       Medication List:  Outpatient Encounter Medications as of 12/7/2022   Medication Sig Dispense Refill   • cetirizine (zyrTEC) 10 MG tablet Take 10 mg by mouth Daily.     • cholecalciferol (VITAMIN D3) 25 MCG (1000 UT) tablet Take 1,000 Units by mouth Daily. vegan     • EPINEPHrine (EPIPEN) 0.3 MG/0.3ML solution auto-injector injection Inject 0.3 mg into the appropriate muscle as directed by prescriber.     • famotidine (PEPCID) 40 MG tablet Take 40 mg by mouth 2 (two) times a day.     • HYDROcodone-acetaminophen (NORCO) 5-325 MG per tablet Take 1 tablet by mouth Every 6 (Six) Hours As Needed.     • lamoTRIgine (LaMICtal) 200 MG tablet Take 200 mg by mouth Every Morning.     • levalbuterol (XOPENEX HFA) 45 MCG/ACT inhaler Inhale 1-2 puffs Every 4 (Four) Hours As Needed for Wheezing. 1 each 0   • LORazepam (ATIVAN) 1 MG tablet Take 1 mg by mouth 2 (Two) Times a Day As Needed for Anxiety.     • multivitamin with minerals tablet tablet Take 1 tablet by mouth Daily.     • QUEtiapine (SEROquel) 100 MG tablet Take 200 mg by mouth Every Morning.     • QUEtiapine (SEROquel) 400 MG tablet Take 400 mg by mouth Every Night.     • venlafaxine 225 MG tablet sustained-release 24 hour 24 hr tablet Take 225 mg by mouth Every Morning.     • [DISCONTINUED] predniSONE (DELTASONE) 20 MG tablet Take 1 tablet by mouth Daily. 5 tablet 0   • [DISCONTINUED] sildenafil (Viagra) 100 MG tablet 1/2 to 1  "tablet as needed 8 tablet 12     No facility-administered encounter medications on file as of 12/7/2022.       Past Surgical History:  Past Surgical History:   Procedure Laterality Date   • APPENDECTOMY     • CHOLECYSTECTOMY     • HYDROCELE EXCISION / REPAIR Left        Review of Systems:  Review of Systems    I have reviewed and confirmed the accuracy of the HPI and ROS as documented by the MA/LPN/RN Mirna Rider MD    Vital Signs:  Visit Vitals  /76 (BP Location: Left arm, Patient Position: Sitting, Cuff Size: Adult)   Pulse (!) 139   Temp 98.2 °F (36.8 °C) (Temporal)   Resp 18   Ht 182.9 cm (72\")   Wt 78.6 kg (173 lb 6 oz)   SpO2 97%   BMI 23.51 kg/m²       Physical Exam  Vitals reviewed.   Constitutional:       Appearance: Normal appearance.   HENT:      Head: Normocephalic and atraumatic.   Eyes:      General: No scleral icterus.     Extraocular Movements: Extraocular movements intact.      Conjunctiva/sclera: Conjunctivae normal.      Pupils: Pupils are equal, round, and reactive to light.   Cardiovascular:      Rate and Rhythm: Normal rate and regular rhythm.      Heart sounds:     No friction rub. No gallop.   Pulmonary:      Effort: Pulmonary effort is normal. No respiratory distress.      Breath sounds: Normal breath sounds. No wheezing.   Abdominal:      General: There is no distension.      Palpations: Abdomen is soft.      Tenderness: There is no abdominal tenderness.   Musculoskeletal:         General: Signs of injury present. No swelling, tenderness or deformity.      Cervical back: Normal range of motion. No rigidity or tenderness.      Comments: Right shoulder positive empty can test  Crepitus with active/passive range of motion  Pain with palpation of joint  Unable to lift arm above 180 degrees   ROM for internal and external rotation is limited   Lymphadenopathy:      Cervical: No cervical adenopathy.   Skin:     General: Skin is warm.      Findings: No lesion or rash.   Neurological:     "  General: No focal deficit present.      Mental Status: He is alert and oriented to person, place, and time. Mental status is at baseline.      Gait: Gait normal.   Psychiatric:         Behavior: Behavior normal.         Thought Content: Thought content normal.         Assessment and Plan:  Problem List Items Addressed This Visit        Health Encounters    Encounter related to worker's compensation claim - Primary       Other    Shoulder injury, initial encounter       An After Visit Summary and PPPS were given to the patient.   Patient presents today for injury of the right shoulder that occurred at work  He has brought his Worker's Comp. paperwork with him today  He was evaluated at [facility] in [place] and they completed the paperwork including his work restrictions but he works for [employer] and they would not accept this from him  He is still in pain he cannot raise his arm above 180 degrees parallel to the ground  His internal and external rotation of the right shoulder is limited in his range of motion and there is pain whenever he attempts to move beyond the limited range of motion  He also has some crepitus on passive range of motion and I also appreciated a positive empty can test  Patient said he was raising something heavy lifting something heavy overhead needed to pop and then immediately felt the pain  He states that the pains have been getting worse and it is impacting his everyday life  He has received a referral to orthopedic surgery for [facility] but the appointment has not been made yet patient states this is due to Worker's Comp.  I went ahead and put in a repeat ambulatory referral to orthopedic surgery as he will need to be evaluated by them in order to determine a full plan of care and to accurately determine when he may be able to return to work with or without restrictions.  In addition I discussed supportive care in the meantime I have prescribed him a  temporary course of meloxicam and this Medrol Dosepak, and I instructed him on reasons to call or return to clinic  I did review make copy of his Worker's Comp. paperwork and instructed the patient that I will work on this within the next few days I provided him a work excuse note for the next 7 days as well and that he can  the paperwork that    I wore protective equipment throughout this patient encounter to include mask and eye protection. Hand hygiene was performed before donning protective equipment and after removal when leaving the room.

## 2022-12-28 ENCOUNTER — OFFICE VISIT (OUTPATIENT)
Dept: ORTHOPEDIC SURGERY | Facility: CLINIC | Age: 42
End: 2022-12-28

## 2022-12-28 VITALS — HEART RATE: 117 BPM | WEIGHT: 173 LBS | OXYGEN SATURATION: 98 % | BODY MASS INDEX: 23.43 KG/M2 | HEIGHT: 72 IN

## 2022-12-28 DIAGNOSIS — M25.511 ACUTE PAIN OF RIGHT SHOULDER: Primary | ICD-10-CM

## 2022-12-28 PROCEDURE — 99203 OFFICE O/P NEW LOW 30 MIN: CPT | Performed by: FAMILY MEDICINE

## 2022-12-28 RX ORDER — MELOXICAM 15 MG/1
15 TABLET ORAL DAILY
Qty: 30 TABLET | Refills: 0 | Status: SHIPPED | OUTPATIENT
Start: 2022-12-28 | End: 2023-01-25

## 2022-12-28 RX ORDER — BUSPIRONE HYDROCHLORIDE 15 MG/1
TABLET ORAL
COMMUNITY
Start: 2022-10-10

## 2022-12-28 RX ORDER — FAMOTIDINE 40 MG/1
40 TABLET, FILM COATED ORAL DAILY
COMMUNITY
End: 2022-12-28

## 2022-12-28 RX ORDER — LAMOTRIGINE 150 MG/1
TABLET ORAL
COMMUNITY
Start: 2022-10-10

## 2022-12-28 NOTE — PROGRESS NOTES
"Primary Care Sports Medicine Office Visit Note     Patient ID: Warren Giraldo is a 42 y.o. male.    Chief Complaint:  Chief Complaint   Patient presents with   • Right Shoulder - Pain, Initial Evaluation     HPI:    Mr. Warren Giraldo is a 42 y.o. male. The patient presents to the clinic today for right shoulder injury. He is accompanied by an adult female.    On 11/29/2022, the patient reports that he was getting his ladder off of his truck, and he felt a \" pop \" in his right shoulder. He states that he lost strength in his right arm. He reports that he has had constant pain since then. He states that the pain goes from dull to sharp, and it sometimes radiates down his arm into his fingers. He reports that he has tingling and numbness that does not last too long. He states that he has limited mobility. He reports that he has limited mobility going to his left arm, and above his head. He states that he has pain when he lifts his right arm. He reports that he had an x-ray done at [facility] in [place]. He states that he has not taken any anti-inflammatories. He reports that he has been off of work since 11/29/2022.    Past Medical History:   Diagnosis Date   • Acne varioliformis    • Allergic rhinitis    • Anxiety    • Costochondritis 03/04/2013   • Depression    • Gastroesophageal reflux disease with esophagitis    • History of kidney stones    • Migraine with aura    • PUD (peptic ulcer disease)        Past Surgical History:   Procedure Laterality Date   • APPENDECTOMY     • CHOLECYSTECTOMY     • HYDROCELE EXCISION / REPAIR Left        Family History   Problem Relation Age of Onset   • Diabetes Father         passed away due to complications   • Coronary artery disease Father    • Mental illness Neg Hx    • Alcohol abuse Neg Hx    • Drug abuse Neg Hx      Social History     Occupational History   • Not on file   Tobacco Use   • Smoking status: Never   • Smokeless tobacco: Never   Vaping Use   • Vaping " "Use: Never used   Substance and Sexual Activity   • Alcohol use: Yes     Alcohol/week: 4.0 standard drinks     Types: 4 Cans of beer per week   • Drug use: Never   • Sexual activity: Defer     Partners: Female      Review of Systems   Constitutional: Negative for activity change, fatigue and fever.   Musculoskeletal: Positive for arthralgias.   Skin: Negative for color change and rash.   Neurological: Negative for numbness.     Objective:    Pulse 117   Ht 182.9 cm (72\")   Wt 78.5 kg (173 lb)   SpO2 98%   BMI 23.46 kg/m²     Physical Examination:  Physical Exam  Vitals and nursing note reviewed.   Constitutional:       General: He is not in acute distress.     Appearance: He is well-developed. He is not diaphoretic.   HENT:      Head: Normocephalic and atraumatic.   Eyes:      Conjunctiva/sclera: Conjunctivae normal.   Pulmonary:      Effort: Pulmonary effort is normal. No respiratory distress.   Skin:     General: Skin is warm.      Capillary Refill: Capillary refill takes less than 2 seconds.   Neurological:      Mental Status: He is alert.       Right Shoulder Exam     Tenderness   The patient is experiencing tenderness in the acromioclavicular joint.    Range of Motion   The patient has normal right shoulder ROM.  Active abduction: normal   Passive abduction: normal   Extension:  60 normal   External rotation:  90+ normal   Forward flexion:  180+ normal   Internal rotation 0 degrees: normal     Muscle Strength   The patient has normal right shoulder strength.  Abduction: 5/5   Internal rotation: 5/5   External rotation: 5/5     Comments:  Jaswinder's test, Speed's test, Yergason's test, belly press test, and resisted external rotation are all negative. Scour test, however, is frankly positive and he has significant tenderness to palpation of the acromioclavicular joint.        Imaging and other tests:  Three view x-ray of the right shoulder today yields mild acromioclavicular osteoarthropathy, otherwise no acute " findings.    Assessment and Plan:    1. Acute pain of right shoulder  - XR Shoulder 2+ View Right  - meloxicam (Mobic) 15 MG tablet; Take 1 tablet by mouth Daily.  Dispense: 30 tablet; Refill: 0    2. Acromioclavicular joint sprain    I discussed pathology and treatment options with the patient today with acromioclavicular joint sprain. I recommended no overhead lifting, pushing, pressing, etc. for the next 2 to 4 weeks. He was placed in a shoulder immobilizer sling for that that I would like for him to wear for the next 2 weeks. He can return at that time if symptoms are still significant, and we could consider intra-articular acromioclavicular joint corticosteroid injection if warranted.    Transcribed from ambient dictation for Lazaro Bullard II, DO by Jose Bacon.  12/28/22   12:53 EST    Patient or patient representative verbalized consent to the visit recording.  I have personally performed the services described in this document as transcribed by the above individual, and it is both accurate and complete.    Disclaimer: Please note that areas of this note were completed with computer voice recognition software.  Quite often unanticipated grammatical, syntax, homophones, and other interpretive errors are inadvertently transcribed by the computer software. Please excuse any errors that have escaped final proofreading.

## 2023-01-11 ENCOUNTER — OFFICE VISIT (OUTPATIENT)
Dept: ORTHOPEDIC SURGERY | Facility: CLINIC | Age: 43
End: 2023-01-11
Payer: OTHER MISCELLANEOUS

## 2023-01-11 VITALS — WEIGHT: 173 LBS | BODY MASS INDEX: 23.43 KG/M2 | HEART RATE: 108 BPM | OXYGEN SATURATION: 98 % | HEIGHT: 72 IN

## 2023-01-11 DIAGNOSIS — M25.511 ACUTE PAIN OF RIGHT SHOULDER: Primary | ICD-10-CM

## 2023-01-11 PROCEDURE — 99213 OFFICE O/P EST LOW 20 MIN: CPT | Performed by: FAMILY MEDICINE

## 2023-01-11 NOTE — PROGRESS NOTES
Primary Care Sports Medicine Office Visit Note     Patient ID: Warren Giraldo is a 42 y.o. male.    Chief Complaint:  Chief Complaint   Patient presents with   • Right Shoulder - Follow-up, Pain     Patient states right shoulder is not getting better        HPI:    Mr. Warren Giraldo is a 42 y.o. male. The patient presents to the clinic today for follow-up of right shoulder pain.    The patient reports that his right shoulder pain has not improved. He states that the pain radiates down his right arm into his fingers, which causes occasional numbness. He notes that he was last seen 2 weeks ago, and he was prescribed meloxicam. The patient reports that the meloxicam is not providing relief.     Past Medical History:   Diagnosis Date   • Acne varioliformis    • Allergic rhinitis    • Anxiety    • Costochondritis 03/04/2013   • Depression    • Gastroesophageal reflux disease with esophagitis    • History of kidney stones    • Migraine with aura    • PUD (peptic ulcer disease)        Past Surgical History:   Procedure Laterality Date   • APPENDECTOMY     • CHOLECYSTECTOMY     • HYDROCELE EXCISION / REPAIR Left        Family History   Problem Relation Age of Onset   • Diabetes Father         passed away due to complications   • Coronary artery disease Father    • Mental illness Neg Hx    • Alcohol abuse Neg Hx    • Drug abuse Neg Hx      Social History     Occupational History   • Not on file   Tobacco Use   • Smoking status: Never   • Smokeless tobacco: Never   Vaping Use   • Vaping Use: Never used   Substance and Sexual Activity   • Alcohol use: Yes     Alcohol/week: 4.0 standard drinks     Types: 4 Cans of beer per week   • Drug use: Never   • Sexual activity: Defer     Partners: Female      Review of Systems   Constitutional: Negative for activity change, fatigue and fever.   Musculoskeletal: Positive for arthralgias.   Skin: Negative for color change and rash.   Neurological: Negative for numbness.  "    Objective:    Pulse 108   Ht 182.9 cm (72\")   Wt 78.5 kg (173 lb)   SpO2 98%   BMI 23.46 kg/m²     Physical Examination:  Physical Exam  Vitals and nursing note reviewed.   Constitutional:       General: He is not in acute distress.     Appearance: He is well-developed. He is not diaphoretic.   HENT:      Head: Normocephalic and atraumatic.   Eyes:      Conjunctiva/sclera: Conjunctivae normal.   Pulmonary:      Effort: Pulmonary effort is normal. No respiratory distress.   Skin:     General: Skin is warm.      Capillary Refill: Capillary refill takes less than 2 seconds.   Neurological:      Mental Status: He is alert.       Left Shoulder Exam     Range of Motion   Left shoulder forward flexion: full.     Comments:  Range of motion of the right shoulder is full, forward flexion, lateral abduction. Jaswinder test is positive for 4/5 weakness as is resisted external rotation. Belly press test is negative. Scarf test is frankly positive.        Imaging and other tests:  No new imaging today.    Assessment and Plan:    1. Acute pain of right shoulder  - MRI Shoulder Right Without Contrast; Future    2. Right shoulder weakness    Plan:   I discussed with the patient today that with nonimprovement with anti-inflammatory medicines and rest and appreciable weakness that it's important that we get an magnetic resonance imaging to evaluate the rotator cuff musculature. An magnetic resonance imaging was ordered today and we 'll see him back here in the next 5-7 days to discuss those results and further treatment options at that time.    Transcribed from ambient dictation for Lazaro Bullard II,  by Em Singh.  01/11/23   12:19 EST    Patient or patient representative verbalized consent to the visit recording.  I have personally performed the services described in this document as transcribed by the above individual, and it is both accurate and complete.   Em Singh    Disclaimer: Please note that areas of this note were " completed with computer voice recognition software.  Quite often unanticipated grammatical, syntax, homophones, and other interpretive errors are inadvertently transcribed by the computer software. Please excuse any errors that have escaped final proofreading.

## 2023-01-16 ENCOUNTER — OFFICE VISIT (OUTPATIENT)
Dept: FAMILY MEDICINE CLINIC | Facility: CLINIC | Age: 43
End: 2023-01-16
Payer: COMMERCIAL

## 2023-01-16 VITALS
RESPIRATION RATE: 18 BRPM | OXYGEN SATURATION: 98 % | HEART RATE: 113 BPM | BODY MASS INDEX: 25.06 KG/M2 | HEIGHT: 72 IN | TEMPERATURE: 98.2 F | SYSTOLIC BLOOD PRESSURE: 130 MMHG | WEIGHT: 185 LBS | DIASTOLIC BLOOD PRESSURE: 96 MMHG

## 2023-01-16 DIAGNOSIS — R53.82 CHRONIC FATIGUE: Primary | ICD-10-CM

## 2023-01-16 DIAGNOSIS — Z00.00 ANNUAL PHYSICAL EXAM: ICD-10-CM

## 2023-01-16 DIAGNOSIS — Z11.59 NEED FOR HEPATITIS C SCREENING TEST: ICD-10-CM

## 2023-01-16 DIAGNOSIS — M79.601 PAIN OF RIGHT UPPER EXTREMITY: ICD-10-CM
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
## 2023-01-16 PROBLEM — S49.90XA SHOULDER INJURY, INITIAL ENCOUNTER: Status: RESOLVED | Noted: 2022-12-07 | Resolved: 2023-01-16

## 2023-01-16 PROBLEM — Z02.6 ENCOUNTER RELATED TO WORKER'S COMPENSATION CLAIM: Status: RESOLVED | Noted: 2022-12-07 | Resolved: 2023-01-16

## 2023-01-16 LAB
BILIRUB BLD-MCNC: NEGATIVE MG/DL
CLARITY, POC: CLEAR
COLOR UR: YELLOW
GLUCOSE UR STRIP-MCNC: NEGATIVE MG/DL
KETONES UR QL: NEGATIVE
LEUKOCYTE EST, POC: NEGATIVE
NITRITE UR-MCNC: NEGATIVE MG/ML
PH UR: 6 [PH] (ref 5–8)
PROT UR STRIP-MCNC: ABNORMAL MG/DL
RBC # UR STRIP: ABNORMAL /UL
SP GR UR: 1.01 (ref 1–1.03)
UROBILINOGEN UR QL: NORMAL

## 2023-01-16 PROCEDURE — 99396 PREV VISIT EST AGE 40-64: CPT | Performed by: FAMILY MEDICINE

## 2023-01-16 PROCEDURE — 81003 URINALYSIS AUTO W/O SCOPE: CPT | Performed by: FAMILY MEDICINE

## 2023-01-16 NOTE — PROGRESS NOTES
Subjective   Warren Giraldo is a 42 y.o. male.   Chief Complaint   Patient presents with   • Annual Exam       History of Present Illness  The patient is here: for coordination of medical care.  Patient has: moderate activity with work/home activities    HEPATITIS C SCREENING Never done  ANNUAL PHYSICAL Never done  TDAP/TD VACCINES(1 - Tdap) due on 12/18/2019  COVID-19 Vaccine(4 - Booster for Pfizer series) due on 02/17/2022  INFLUENZA VACCINE due on 08/01/2022  Pneumococcal Vaccine 0-64 Aged Out  Current pain scale 0/10.    Ongoing right arm pain, mobic ineffective        The following portions of the patient's history were reviewed and updated as appropriate: allergies, current medications, past family history, past medical history, past social history, past surgical history and problem list.    Patient Active Problem List   Diagnosis   • Acne varioliformis   • Acute stress disorder   • Allergic rhinitis   • Anxiety   • Classical migraine   • Costochondritis   • Gastroesophageal reflux disease with esophagitis   • PUD (peptic ulcer disease)   • Anaphylaxis due to food   • Allergic reaction to alpha-gal   • Bipolar affective disorder, depressed (HCC)   • Shortness of breath   • Annual physical exam   • BMI 25.0-25.9,adult       Current Outpatient Medications on File Prior to Visit   Medication Sig Dispense Refill   • busPIRone (BUSPAR) 15 MG tablet      • cetirizine (zyrTEC) 10 MG tablet Take 10 mg by mouth Daily.     • cholecalciferol (VITAMIN D3) 25 MCG (1000 UT) tablet Take 1,000 Units by mouth Daily. vegan     • EPINEPHrine (EPIPEN) 0.3 MG/0.3ML solution auto-injector injection Inject 0.3 mg into the appropriate muscle as directed by prescriber.     • famotidine (PEPCID) 40 MG tablet Take 40 mg by mouth 2 (two) times a day.     • lamoTRIgine (LaMICtal) 150 MG tablet      • levalbuterol (XOPENEX HFA) 45 MCG/ACT inhaler Inhale 1-2 puffs Every 4 (Four) Hours As Needed for Wheezing. 1 each 0   • LORazepam  (ATIVAN) 1 MG tablet Take 1 mg by mouth 2 (Two) Times a Day As Needed for Anxiety.     • meloxicam (Mobic) 15 MG tablet Take 1 tablet by mouth Daily. 30 tablet 0   • multivitamin with minerals tablet tablet Take 1 tablet by mouth Daily.     • QUEtiapine (SEROquel) 100 MG tablet Take 200 mg by mouth Every Morning.     • QUEtiapine (SEROquel) 400 MG tablet Take 400 mg by mouth Every Night.     • venlafaxine 225 MG tablet sustained-release 24 hour 24 hr tablet Take 225 mg by mouth Every Morning.       No current facility-administered medications on file prior to visit.     Current outpatient and discharge medications have been reconciled for the patient.  Reviewed by: Baldev Sandy MD      Allergies   Allergen Reactions   • Bee Venom Anaphylaxis   • Beef-Derived Products Anaphylaxis     Alpha-gal   • Dairy Aid [Tilactase] Anaphylaxis   • Latex Anaphylaxis   • Meat Extract Anaphylaxis   • Milk Protein Anaphylaxis     Alpha-Gal   • Other Anaphylaxis     Has Alpha gal so any mammal products   • Pork-Derived Products Anaphylaxis     Alpha-gal     • Zinc Gelatin [Zinc] Anaphylaxis       Review of Systems   Constitutional: Negative for activity change, appetite change, fatigue and fever.   HENT: Negative for ear pain, swollen glands and voice change.    Eyes: Negative for visual disturbance.   Respiratory: Negative for shortness of breath and wheezing.    Cardiovascular: Negative for chest pain and leg swelling.   Gastrointestinal: Negative for abdominal pain, blood in stool, constipation, diarrhea, nausea and vomiting.   Endocrine: Negative for polydipsia and polyuria.   Genitourinary: Negative for dysuria, frequency and hematuria.   Musculoskeletal: Negative for joint swelling, neck pain and neck stiffness.   Skin: Negative for rash and wound.   Neurological: Negative for weakness, numbness and headache.   Psychiatric/Behavioral: Negative for suicidal ideas and depressed mood.     I have reviewed and confirmed the  "accuracy of the ROS as documented by the MA/LPN/RN Baldev Sandy MD    Objective   Visit Vitals  /96 (BP Location: Right arm, Patient Position: Sitting, Cuff Size: Adult)   Pulse 113   Temp 98.2 °F (36.8 °C)   Resp 18   Ht 182.9 cm (72\")   Wt 83.9 kg (185 lb)   SpO2 98%   BMI 25.09 kg/m²      **  Physical Exam  Constitutional:       Appearance: He is well-developed.   HENT:      Head: Normocephalic and atraumatic.      Right Ear: External ear normal.      Left Ear: External ear normal.      Nose: Nose normal.   Eyes:      Pupils: Pupils are equal, round, and reactive to light.   Cardiovascular:      Rate and Rhythm: Normal rate and regular rhythm.      Heart sounds: Normal heart sounds.   Pulmonary:      Effort: Pulmonary effort is normal.      Breath sounds: Normal breath sounds.   Abdominal:      General: Bowel sounds are normal.      Palpations: Abdomen is soft.   Musculoskeletal:         General: Normal range of motion.      Cervical back: Normal range of motion and neck supple.   Skin:     General: Skin is warm and dry.   Neurological:      Mental Status: He is alert and oriented to person, place, and time.   Psychiatric:         Behavior: Behavior normal.         Thought Content: Thought content normal.         Judgment: Judgment normal.       Derm Physical Exam    Diagnoses and all orders for this visit:    1. Chronic fatigue (Primary)  -     CBC & Differential  -     TSH  -     Comprehensive Metabolic Panel  -     Testosterone  -     Vitamin B12  -     Vitamin D 25 hydroxy    2. Annual physical exam  -     POC Urinalysis Dipstick, Multipro  -     Hepatitis C Antibody  -     CBC & Differential  -     Lipid Panel With / Chol / HDL Ratio  -     TSH  -     Comprehensive Metabolic Panel  -     Testosterone  -     Vitamin B12  -     Vitamin D 25 hydroxy    3. BMI 25.0-25.9,adult    4. Need for hepatitis C screening test  -     Hepatitis C Antibody    5. Pain of right upper extremity  -     " diclofenac (VOLTAREN) 50 MG EC tablet; Take 1 tablet by mouth 2 (Two) Times a Day.  Dispense: 60 tablet; Refill: 2     Findings discussed. All questions answered.  Discussed anticipatory guidance, diet, exercise, and weight loss.  Discussed safety and routine screening examinations.  Discussed self-examinations.  BMI is >= 25 and <30. (Overweight) The following options were offered after discussion;: weight loss educational material (shared in after visit summary)     Warren Giraldo  reports that he has never smoked. He has never used smokeless tobacco..Follow-up for routine health maintenance as indicated.      Expected course, medications, and adverse effects discussed as appropriate.  Call or return if worsening or persistent symptoms.  I wore protective equipment throughout this patient encounter to include mask and eye protection. Hand hygiene was performed before donning protective equipment and after removal when leaving the room.       This document is intended for medical professional use only.

## 2023-01-17 ENCOUNTER — PATIENT MESSAGE (OUTPATIENT)
Dept: FAMILY MEDICINE CLINIC | Facility: CLINIC | Age: 43
End: 2023-01-17
Payer: COMMERCIAL

## 2023-01-17 DIAGNOSIS — M79.601 PAIN OF RIGHT UPPER EXTREMITY: ICD-10-CM

## 2023-01-17 LAB
25(OH)D3+25(OH)D2 SERPL-MCNC: 75.4 NG/ML (ref 30–100)
ALBUMIN SERPL-MCNC: 4.9 G/DL (ref 4–5)
ALBUMIN/GLOB SERPL: 2.7 {RATIO} (ref 1.2–2.2)
ALP SERPL-CCNC: 57 IU/L (ref 44–121)
ALT SERPL-CCNC: 33 IU/L (ref 0–44)
AST SERPL-CCNC: 21 IU/L (ref 0–40)
BASOPHILS # BLD AUTO: 0.1 X10E3/UL (ref 0–0.2)
BASOPHILS NFR BLD AUTO: 1 %
BILIRUB SERPL-MCNC: 0.3 MG/DL (ref 0–1.2)
BUN SERPL-MCNC: 14 MG/DL (ref 6–24)
BUN/CREAT SERPL: 12 (ref 9–20)
CALCIUM SERPL-MCNC: 9.9 MG/DL (ref 8.7–10.2)
CHLORIDE SERPL-SCNC: 105 MMOL/L (ref 96–106)
CHOLEST SERPL-MCNC: 189 MG/DL (ref 100–199)
CHOLEST/HDLC SERPL: 4.7 RATIO (ref 0–5)
CO2 SERPL-SCNC: 22 MMOL/L (ref 20–29)
CREAT SERPL-MCNC: 1.13 MG/DL (ref 0.76–1.27)
EGFRCR SERPLBLD CKD-EPI 2021: 84 ML/MIN/1.73
EOSINOPHIL # BLD AUTO: 0.2 X10E3/UL (ref 0–0.4)
EOSINOPHIL NFR BLD AUTO: 5 %
ERYTHROCYTE [DISTWIDTH] IN BLOOD BY AUTOMATED COUNT: 12.8 % (ref 11.6–15.4)
GLOBULIN SER CALC-MCNC: 1.8 G/DL (ref 1.5–4.5)
GLUCOSE SERPL-MCNC: 93 MG/DL (ref 70–99)
HCT VFR BLD AUTO: 46.4 % (ref 37.5–51)
HCV AB S/CO SERPL IA: <0.1 S/CO RATIO (ref 0–0.9)
HDLC SERPL-MCNC: 40 MG/DL
HGB BLD-MCNC: 15.9 G/DL (ref 13–17.7)
IMM GRANULOCYTES # BLD AUTO: 0.1 X10E3/UL (ref 0–0.1)
IMM GRANULOCYTES NFR BLD AUTO: 2 %
LDLC SERPL CALC-MCNC: 119 MG/DL (ref 0–99)
LYMPHOCYTES # BLD AUTO: 1.4 X10E3/UL (ref 0.7–3.1)
LYMPHOCYTES NFR BLD AUTO: 29 %
MCH RBC QN AUTO: 32.6 PG (ref 26.6–33)
MCHC RBC AUTO-ENTMCNC: 34.3 G/DL (ref 31.5–35.7)
MCV RBC AUTO: 95 FL (ref 79–97)
MONOCYTES # BLD AUTO: 0.6 X10E3/UL (ref 0.1–0.9)
MONOCYTES NFR BLD AUTO: 12 %
NEUTROPHILS # BLD AUTO: 2.5 X10E3/UL (ref 1.4–7)
NEUTROPHILS NFR BLD AUTO: 51 %
PLATELET # BLD AUTO: 264 X10E3/UL (ref 150–450)
POTASSIUM SERPL-SCNC: 4.5 MMOL/L (ref 3.5–5.2)
PROT SERPL-MCNC: 6.7 G/DL (ref 6–8.5)
RBC # BLD AUTO: 4.87 X10E6/UL (ref 4.14–5.8)
SODIUM SERPL-SCNC: 141 MMOL/L (ref 134–144)
TESTOST SERPL-MCNC: 236 NG/DL (ref 264–916)
TRIGL SERPL-MCNC: 172 MG/DL (ref 0–149)
TSH SERPL DL<=0.005 MIU/L-ACNC: 1.2 UIU/ML (ref 0.45–4.5)
VIT B12 SERPL-MCNC: 1043 PG/ML (ref 232–1245)
VLDLC SERPL CALC-MCNC: 30 MG/DL (ref 5–40)
WBC # BLD AUTO: 4.8 X10E3/UL (ref 3.4–10.8)

## 2023-01-17 NOTE — TELEPHONE ENCOUNTER
Caller: Warren Giraldo    Relationship: Self    Best call back number: [phone]    Requested Prescriptions:   Requested Prescriptions     Pending Prescriptions Disp Refills   • diclofenac (VOLTAREN) 50 MG EC tablet 60 tablet 2     Sig: Take 1 tablet by mouth 2 (Two) Times a Day.        Pharmacy where request should be sent: [facility]/PHARMACY #[id number] - [facility] [place] [address] [phone] [facility] [phone]      Additional details provided by patient: DUE T O INSURANCE THIS PRESCRIPTION WILL NEED TO BE SENT TO [facility].  PATIENT DIDN'T  FROM [facility]     Does the patient have less than a 3 day supply:  [x] Yes  [] No    Would you like a call back once the refill request has been completed: [x] Yes [] No    If the office needs to give you a call back, can they leave a voicemail: [x] Yes [] No    Abdirahman Dias Rep   01/17/23 08:53 EST

## 2023-01-25 ENCOUNTER — OFFICE VISIT (OUTPATIENT)
Dept: ORTHOPEDIC SURGERY | Facility: CLINIC | Age: 43
End: 2023-01-25
Payer: OTHER MISCELLANEOUS

## 2023-01-25 VITALS — BODY MASS INDEX: 25.06 KG/M2 | WEIGHT: 185 LBS | OXYGEN SATURATION: 99 % | HEART RATE: 111 BPM | HEIGHT: 72 IN

## 2023-01-25 DIAGNOSIS — M25.511 ACUTE PAIN OF RIGHT SHOULDER: Primary | ICD-10-CM

## 2023-01-25 DIAGNOSIS — M75.51 SUBACROMIAL BURSITIS OF RIGHT SHOULDER JOINT: ICD-10-CM

## 2023-01-25 PROCEDURE — 20610 DRAIN/INJ JOINT/BURSA W/O US: CPT | Performed by: FAMILY MEDICINE

## 2023-01-25 PROCEDURE — 99213 OFFICE O/P EST LOW 20 MIN: CPT | Performed by: FAMILY MEDICINE

## 2023-01-25 RX ADMIN — TRIAMCINOLONE ACETONIDE 80 MG: 40 INJECTION, SUSPENSION INTRA-ARTICULAR; INTRAMUSCULAR at 13:04

## 2023-01-25 NOTE — PROGRESS NOTES
"Primary Care Sports Medicine Office Visit Note     Patient ID: Warren Giraldo is a 42 y.o. male.    Chief Complaint:  Chief Complaint   Patient presents with   • Right Shoulder - Follow-up, Pain     MRI Results      HPI:    Mr. Warren Giraldo is a 42 y.o. male. The patient presents to the clinic today for follow-up of right shoulder injury and MRI.  The patient states that he is still experiencing consistent pain. He adds that his shoulder \"burns like and open wound.\" He is experiencing shooting pains within his arm and numbness in his hand. His numbness and tingling alternates between his pinkie and fourth digit.     Past Medical History:   Diagnosis Date   • Acne varioliformis    • Allergic    • Allergic rhinitis    • Anxiety    • Costochondritis 03/04/2013   • Depression    • Gastroesophageal reflux disease with esophagitis    • History of kidney stones    • Migraine with aura    • PUD (peptic ulcer disease)        Past Surgical History:   Procedure Laterality Date   • APPENDECTOMY     • CHOLECYSTECTOMY     • HYDROCELE EXCISION / REPAIR Left        Family History   Problem Relation Age of Onset   • Diabetes Father         passed away due to complications   • Coronary artery disease Father    • Depression Mother    • Diabetes Mother    • Mental illness Neg Hx    • Alcohol abuse Neg Hx    • Drug abuse Neg Hx      Social History     Occupational History   • Not on file   Tobacco Use   • Smoking status: Never   • Smokeless tobacco: Never   Vaping Use   • Vaping Use: Never used   Substance and Sexual Activity   • Alcohol use: Yes     Alcohol/week: 4.0 standard drinks     Types: 4 Cans of beer per week   • Drug use: Never   • Sexual activity: Yes     Partners: Female      Review of Systems   Constitutional: Negative for activity change and fever.   Respiratory: Negative for cough and shortness of breath.    Cardiovascular: Negative for chest pain.   Gastrointestinal: Negative for constipation, diarrhea, nausea and " "vomiting.   Musculoskeletal: Positive for arthralgias.   Skin: Negative for color change and rash.   Neurological: Negative for weakness.   Hematological: Does not bruise/bleed easily.     Objective:     Pulse 111   Ht 182.9 cm (72\")   Wt 83.9 kg (185 lb)   SpO2 99%   BMI 25.09 kg/m²     Physical Exam  Vitals and nursing note reviewed.   Constitutional:       General: He is not in acute distress.     Appearance: He is well-developed. He is not diaphoretic.   HENT:      Head: Normocephalic and atraumatic.   Eyes:      Conjunctiva/sclera: Conjunctivae normal.   Pulmonary:      Effort: Pulmonary effort is normal. No respiratory distress.   Skin:     General: Skin is warm.      Capillary Refill: Capillary refill takes less than 2 seconds.   Neurological:      Mental Status: He is alert.     Ortho exam:  Right shoulder examination reveals full range of motion with moderate pain with Jaswinder and resisted external rotation testing, but no weakness.    Assessment and Plan:    1. Acute pain of right shoulder  - Ambulatory Referral to Physical Therapy    2. Rotator cuff tendinitis.    3. Subacromial bursitis.    After discussion of risks and benefits, the patient elected to proceed with corticosteroid injection to the right subacromial bursa.The patient tolerated this procedure well without any complaints or problems.  I recommended continuation of conservative management as previous. I recommend he start physical therapy for stretching and strengthening of rotator cuff musculature. We will continue work restriction with no lifting, pushing, or pressing greater than 10 pounds with the right upper extremity for the next 4 weeks. I would like to have him return at that time for follow-up evaluation.  RTC in 3-6 months or sooner if symptoms recur.    Transcribed from ambient dictation for Lazaro Bullard II, DO by Brooke Diez.  01/25/23   13:11 EST    Patient or patient representative verbalized consent to the visit " recording.  I have personally performed the services described in this document as transcribed by the above individual, and it is both accurate and complete.  Brooke Diez  Disclaimer: Please note that areas of this note were completed with computer voice recognition software.  Quite often unanticipated grammatical, syntax, homophones, and other interpretive errors are inadvertently transcribed by the computer software. Please excuse any errors that have escaped final proofreading.

## 2023-01-27 RX ORDER — TRIAMCINOLONE ACETONIDE 40 MG/ML
80 INJECTION, SUSPENSION INTRA-ARTICULAR; INTRAMUSCULAR
Status: COMPLETED | OUTPATIENT
Start: 2023-01-25 | End: 2023-01-25

## 2023-01-27 NOTE — PROGRESS NOTES
Procedure   Large Joint Arthrocentesis: R subacromial bursa  Date/Time: 1/25/2023 1:04 PM  Consent given by: patient  Site marked: site marked  Timeout: Immediately prior to procedure a time out was called to verify the correct patient, procedure, equipment, support staff and site/side marked as required   Supporting Documentation  Indications: pain   Procedure Details  Location: shoulder - R subacromial bursa  Preparation: Patient was prepped and draped in the usual sterile fashion  Needle size: 25 G  Approach: posterior  Medications administered: 80 mg triamcinolone acetonide 40 MG/ML (6cc of 1% lidocaine without epinepherine and 2cc of 40mg Kenalog)  Patient tolerance: patient tolerated the procedure well with no immediate complications (Blood loss negligable, pt admits to almost immediate pain reflief post injection with gentle ROM.)

## 2023-03-01 ENCOUNTER — OFFICE VISIT (OUTPATIENT)
Dept: ORTHOPEDIC SURGERY | Facility: CLINIC | Age: 43
End: 2023-03-01
Payer: OTHER MISCELLANEOUS

## 2023-03-01 VITALS — BODY MASS INDEX: 25.06 KG/M2 | WEIGHT: 185 LBS | OXYGEN SATURATION: 98 % | HEART RATE: 133 BPM | HEIGHT: 72 IN

## 2023-03-01 DIAGNOSIS — M75.81 TENDINITIS OF RIGHT ROTATOR CUFF: Primary | ICD-10-CM

## 2023-03-01 PROCEDURE — 99213 OFFICE O/P EST LOW 20 MIN: CPT | Performed by: FAMILY MEDICINE

## 2023-03-01 NOTE — PROGRESS NOTES
Primary Care Sports Medicine Office Visit Note     Patient ID: Warren Giraldo is a 42 y.o. male.    Chief Complaint:  Chief Complaint   Patient presents with   • Right Shoulder - Follow-up, Pain     Patient states has had a lot of improvement with the right shoulder.      HPI:    Mr. Warren Giraldo is a 42 y.o. male. The patient presents to the clinic for right shoulder follow-up.    The patient reports that his right shoulder has significantly improved. He has been attending [facility] for physical therapy. He has been working with the physical therapist using pulleys, lifting the dumbbell above his head, and rolling the weighted ball. He can perform all physical therapy activities and exercises without pain or difficulty. He believes he can return to [facility]. According to him, he bought a [employer] Cup for shoulder support.    Past Medical History:   Diagnosis Date   • Acne varioliformis    • Allergic    • Allergic rhinitis    • Anxiety    • Costochondritis 03/04/2013   • Depression    • Gastroesophageal reflux disease with esophagitis    • History of kidney stones    • Migraine with aura    • PUD (peptic ulcer disease)        Past Surgical History:   Procedure Laterality Date   • APPENDECTOMY     • CHOLECYSTECTOMY     • HYDROCELE EXCISION / REPAIR Left        Family History   Problem Relation Age of Onset   • Diabetes Father         passed away due to complications   • Coronary artery disease Father    • Depression Mother    • Diabetes Mother    • Mental illness Neg Hx    • Alcohol abuse Neg Hx    • Drug abuse Neg Hx      Social History     Occupational History   • Not on file   Tobacco Use   • Smoking status: Never   • Smokeless tobacco: Never   Vaping Use   • Vaping Use: Never used   Substance and Sexual Activity   • Alcohol use: Yes     Alcohol/week: 4.0 standard drinks     Types: 4 Cans of beer per week   • Drug use: Never   • Sexual activity: Yes     Partners: Female      Review of Systems   Constitutional:  "Negative for activity change, fatigue and fever.   Musculoskeletal: Positive for arthralgias.   Skin: Negative for color change and rash.   Neurological: Negative for numbness.     Objective:    Pulse (!) 133   Ht 182.9 cm (72\")   Wt 83.9 kg (185 lb)   SpO2 98%   BMI 25.09 kg/m²     Physical Examination:  Physical Exam  Vitals and nursing note reviewed.   Constitutional:       General: He is not in acute distress.     Appearance: He is well-developed. He is not diaphoretic.   HENT:      Head: Normocephalic and atraumatic.   Eyes:      Conjunctiva/sclera: Conjunctivae normal.   Pulmonary:      Effort: Pulmonary effort is normal. No respiratory distress.   Skin:     General: Skin is warm.      Capillary Refill: Capillary refill takes less than 2 seconds.   Neurological:      Mental Status: He is alert.       Right Shoulder Exam     Tenderness   The patient is experiencing no tenderness.    Range of Motion   Active abduction: normal   Passive abduction: normal   Extension: normal   External rotation: normal   Forward flexion: normal   Internal rotation 0 degrees:  Mid thoracic normal     Muscle Strength   Abduction: 5/5   Internal rotation: 5/5   External rotation: 5/5   Supraspinatus: 5/5   Subscapularis: 5/5   Biceps: 5/5     Tests   Apprehension: negative  Katz test: negative  Impingement: negative  Sulcus: absent    Other   Erythema: absent  Sensation: normal  Pulse: present    Comments:  Negative Jaswinder, negative resisted external rotation, negative liftoff.  Negative [place]'s, negative scarf.  Negative Neer.  Negative speeds/Yergason's.      Cervical range of motion is full with no tenderness to palpation to the bony midline or paraspinal cervical musculature.  Spurling's maneuver to the right is negative.        Imaging and other tests:  No new imaging today, 03/01/2023.    Assessment and Plan:    1. Rotator cuff tendinitis    The patient has responded extremely well to physical therapy and has had nearly " complete symptom resolution. Strength and range of motion are full. He would like to return to work. A [id number] note was given today. Continue occasional anti-inflammatories as needed; otherwise, physical therapy can provide him with a home exercise program to complete at home. Return to the clinic as needed.    Transcribed from ambient dictation for Lazaro Bullard II,  by Adriana Alvarado.  03/01/23   12:38 EST    Patient or patient representative verbalized consent to the visit recording.  I have personally performed the services described in this document as transcribed by the above individual, and it is both accurate and complete.    Disclaimer: Please note that areas of this note were completed with computer voice recognition software.  Quite often unanticipated grammatical, syntax, homophones, and other interpretive errors are inadvertently transcribed by the computer software. Please excuse any errors that have escaped final proofreading.

## 2023-08-03 ENCOUNTER — TELEPHONE (OUTPATIENT)
Dept: FAMILY MEDICINE CLINIC | Facility: CLINIC | Age: 43
End: 2023-08-03
Payer: COMMERCIAL

## 2023-08-03 DIAGNOSIS — R79.89 LOW TESTOSTERONE: Primary | ICD-10-CM

## 2023-08-03 RX ORDER — EPINEPHRINE 0.3 MG/.3ML
0.3 INJECTION SUBCUTANEOUS ONCE
Qty: 1 EACH | Refills: 0 | Status: SHIPPED | OUTPATIENT
Start: 2023-08-03 | End: 2023-08-03

## 2023-08-08 ENCOUNTER — PATIENT MESSAGE (OUTPATIENT)
Dept: FAMILY MEDICINE CLINIC | Facility: CLINIC | Age: 43
End: 2023-08-08
Payer: COMMERCIAL

## 2023-08-08 LAB — TESTOST SERPL-MCNC: 304 NG/DL (ref 264–916)

## 2023-08-14 ENCOUNTER — PATIENT MESSAGE (OUTPATIENT)
Dept: FAMILY MEDICINE CLINIC | Facility: CLINIC | Age: 43
End: 2023-08-14
Payer: COMMERCIAL

## 2023-08-27 ENCOUNTER — PATIENT MESSAGE (OUTPATIENT)
Dept: FAMILY MEDICINE CLINIC | Facility: CLINIC | Age: 43
End: 2023-08-27
Payer: COMMERCIAL

## 2023-08-28 ENCOUNTER — TELEPHONE (OUTPATIENT)
Dept: FAMILY MEDICINE CLINIC | Facility: CLINIC | Age: 43
End: 2023-08-28
Payer: COMMERCIAL

## 2023-08-28 DIAGNOSIS — N52.9 ERECTILE DISORDER: Primary | ICD-10-CM

## 2023-08-28 DIAGNOSIS — N52.9 ERECTILE DYSFUNCTION, UNSPECIFIED ERECTILE DYSFUNCTION TYPE: ICD-10-CM

## 2023-08-28 RX ORDER — TADALAFIL 5 MG/1
5 TABLET ORAL DAILY PRN
Qty: 30 TABLET | Refills: 5 | Status: SHIPPED | OUTPATIENT
Start: 2023-08-28

## 2023-08-28 NOTE — TELEPHONE ENCOUNTER
Received fax from [facility] requesting PA on Tadalafil 5mg tabs. PA submitted online thru [employer]. Waiting on authorization.

## 2023-10-09 ENCOUNTER — OFFICE VISIT (OUTPATIENT)
Dept: FAMILY MEDICINE CLINIC | Facility: CLINIC | Age: 43
End: 2023-10-09
Payer: COMMERCIAL

## 2023-10-09 ENCOUNTER — HOSPITAL ENCOUNTER (OUTPATIENT)
Dept: GENERAL RADIOLOGY | Facility: HOSPITAL | Age: 43
Discharge: HOME | End: 2023-10-09
Payer: COMMERCIAL

## 2023-10-09 VITALS
HEART RATE: 102 BPM | HEIGHT: 72 IN | WEIGHT: 184 LBS | SYSTOLIC BLOOD PRESSURE: 142 MMHG | BODY MASS INDEX: 24.92 KG/M2 | DIASTOLIC BLOOD PRESSURE: 82 MMHG | RESPIRATION RATE: 18 BRPM | OXYGEN SATURATION: 96 %

## 2023-10-09 DIAGNOSIS — S70.11XA CONTUSION OF RIGHT THIGH, INITIAL ENCOUNTER: Primary | ICD-10-CM

## 2023-10-09 DIAGNOSIS — H10.33 ACUTE CONJUNCTIVITIS OF BOTH EYES, UNSPECIFIED ACUTE CONJUNCTIVITIS TYPE: ICD-10-CM

## 2023-10-09 DIAGNOSIS — Z91.030 ALLERGIC TO BEES: ICD-10-CM

## 2023-10-09 DIAGNOSIS — T78.1XXA ALLERGIC REACTION TO ALPHA-GAL: ICD-10-CM

## 2023-10-09 LAB
BILIRUB BLD-MCNC: NEGATIVE MG/DL
CLARITY, POC: CLEAR
COLOR UR: YELLOW
GLUCOSE UR STRIP-MCNC: NEGATIVE MG/DL
KETONES UR QL: NEGATIVE
LEUKOCYTE EST, POC: NEGATIVE
NITRITE UR-MCNC: NEGATIVE MG/ML
PH UR: 6 [PH] (ref 5–8)
PROT UR STRIP-MCNC: NEGATIVE MG/DL
RBC # UR STRIP: NEGATIVE /UL
SP GR UR: 1 (ref 1–1.03)
UROBILINOGEN UR QL: NORMAL

## 2023-10-09 PROCEDURE — 73502 X-RAY EXAM HIP UNI 2-3 VIEWS: CPT

## 2023-10-09 PROCEDURE — 81003 URINALYSIS AUTO W/O SCOPE: CPT

## 2023-10-09 PROCEDURE — 73560 X-RAY EXAM OF KNEE 1 OR 2: CPT

## 2023-10-09 PROCEDURE — 99214 OFFICE O/P EST MOD 30 MIN: CPT

## 2023-10-09 RX ORDER — CYCLOBENZAPRINE HCL 5 MG
5 TABLET ORAL DAILY PRN
Qty: 30 TABLET | Refills: 0 | Status: SHIPPED | OUTPATIENT
Start: 2023-10-09

## 2023-10-09 RX ORDER — DICLOFENAC SODIUM 75 MG/1
75 TABLET, DELAYED RELEASE ORAL 2 TIMES DAILY
Qty: 60 TABLET | Refills: 0 | Status: SHIPPED | OUTPATIENT
Start: 2023-10-09

## 2023-10-09 RX ORDER — EPINEPHRINE 0.3 MG/.3ML
0.3 INJECTION SUBCUTANEOUS ONCE
Qty: 1 EACH | Refills: 0 | Status: SHIPPED | OUTPATIENT
Start: 2023-10-09 | End: 2023-10-09

## 2023-10-09 NOTE — PROGRESS NOTES
Office Note     Name: Warren Giraldo    : 1980     MRN: [id number]     Chief Complaint  Fall    Subjective     Warren Giraldo presents to [facility] for an acute complaint of Recent fall    History of Present Illness  Patient reports one week ago, he was on his front porch standing on a chair fixing a light when the chair fell from under him and he fell. He fell on his right side. He has a sharp pain in hip and radiates down to his knee. Some movements make it worse, and some movement he feels fine. He reports some numbness in his right arm when it happened.           Fall  The accident occurred 5 to 7 days ago. The fall occurred from a stool. He fell from a height of 3 to 5 ft. He landed on [place]. There was no blood loss. The point of impact was the right elbow and right knee. The pain is at a severity of 5/10. The pain is mild. The symptoms are aggravated by rotation. Pertinent negatives include no fever. He has tried NSAID for the symptoms.     The patient presents for evaluation after a fall.    He was standing on a chair while working on his patio, the chair flipped, and he landed on his right shoulder.   He sustained a laceration to his right elbow, as well as a severe contusions to his right shoulder and right elbow.   His right shoulder pain was initially severe; however, it has improved.   He has a history of right shoulder injury in , for which he attended physical therapy.    He is also experiencing shooting right posterior thigh pain that radiates into his hip with certain movements.   He has a contusion on his right posterior thigh.   He has relief with ambulation.   His lower extremity pain worsened on 10/08/2023 after resting, and he notes particular difficulty ascending 13 stairs.   His right thigh pain was so severe that he could not ambulate on his right lower extremity and he had to crawl up the stairs.  He has been taking around the clock  ibuprofen since 10/06/2023 without benefit.   He denies paresthesia in his right lower extremity.   His pain is intermittently worsened when he sits up.   He denies pain with getting in and out of a vehicle.    He has alpha-gal.  He is uncertain when he was bitten by a tick.  He requests a refill of his EpiPen as he requires frequent use of this.  He has allergic reactions to the smell of meat.  He is unable to tolerate cheese or milk.  He is able to tolerate any poultry.  He is also allergic to bees, for which he has required use of his EpiPen.    He takes BuSpar, Zyrtec, vitamin D, Pepcid, Lamictal, Xopenex, Ativan as needed, multivitamin, Seroquel, tadalafil, and venlafaxine.      Current Outpatient Medications:     busPIRone (BUSPAR) 15 MG tablet, , Disp: , Rfl:     cetirizine (zyrTEC) 10 MG tablet, Take 1 tablet by mouth Daily., Disp: , Rfl:     cholecalciferol (VITAMIN D3) 25 MCG (1000 UT) tablet, Take 1 tablet by mouth Daily. vegan, Disp: , Rfl:     EPINEPHrine (EpiPen 2-Benedict) 0.3 MG/0.3ML solution auto-injector injection, Inject 0.3 mL into the appropriate muscle as directed by prescriber 1 (One) Time for 1 dose., Disp: 1 each, Rfl: 0    famotidine (PEPCID) 40 MG tablet, Take 1 tablet by mouth 2 (two) times a day., Disp: , Rfl:     lamoTRIgine (LaMICtal) 150 MG tablet, , Disp: , Rfl:     levalbuterol (XOPENEX HFA) 45 MCG/ACT inhaler, Inhale 1-2 puffs Every 4 (Four) Hours As Needed for Wheezing., Disp: 1 each, Rfl: 0    LORazepam (ATIVAN) 1 MG tablet, Take 1 tablet by mouth 2 (Two) Times a Day As Needed for Anxiety., Disp: , Rfl:     multivitamin with minerals tablet tablet, Take 1 tablet by mouth Daily., Disp: , Rfl:     QUEtiapine (SEROquel) 100 MG tablet, Take 2 tablets by mouth Every Morning., Disp: , Rfl:     QUEtiapine (SEROquel) 400 MG tablet, Take 1 tablet by mouth Every Night., Disp: , Rfl:     tadalafil (Cialis) 5 MG tablet, Take 1 tablet by mouth Daily As Needed for Erectile  Dysfunction., Disp: 30 tablet, Rfl: 5    venlafaxine 225 MG tablet sustained-release 24 hour 24 hr tablet, Take 1 tablet by mouth Every Morning., Disp: , Rfl:     cyclobenzaprine (FLEXERIL) 5 MG tablet, Take 1 tablet by mouth Daily As Needed for Muscle Spasms., Disp: 30 tablet, Rfl: 0    diclofenac (VOLTAREN) 75 MG EC tablet, Take 1 tablet by mouth 2 (Two) Times a Day., Disp: 60 tablet, Rfl: 0    Allergies   Allergen Reactions    Bee Venom Anaphylaxis    Beef-Derived Products Anaphylaxis     Alpha-gal    Dairy Aid [Tilactase] Anaphylaxis    Latex Anaphylaxis    Meat Extract Anaphylaxis    Milk Protein Anaphylaxis     Alpha-Gal    Other Anaphylaxis     Has Alpha gal so any mammal products    Pork-Derived Products Anaphylaxis     Alpha-gal      Zinc Gelatin [Zinc] Anaphylaxis       Past Surgical History:   Procedure Laterality Date    APPENDECTOMY      CHOLECYSTECTOMY      HYDROCELE EXCISION / REPAIR Left         Family History   Problem Relation Age of Onset    Diabetes Father         passed away due to complications    Coronary artery disease Father     Depression Mother     Diabetes Mother     Mental illness Neg Hx     Alcohol abuse Neg Hx     Drug abuse Neg Hx             10/9/2023     1:05 PM   PHQ-2/PHQ-9 Depression Screening   Little Interest or Pleasure in Doing Things 0-->not at all   Feeling Down, Depressed or Hopeless 0-->not at all   PHQ-9: Brief Depression Severity Measure Score 0       Review of Systems   Constitutional:  Negative for chills, fatigue and fever.   Eyes: Negative.    Respiratory:  Negative for cough, shortness of breath and wheezing.    Cardiovascular: Negative.    Musculoskeletal:  Positive for arthralgias, gait problem and myalgias. Negative for back pain, joint swelling, neck pain, neck stiffness and bursitis.   Skin:  Positive for wound and bruise. Negative for rash.   Neurological:  Negative for dizziness, seizures, weakness, light-headedness, headache and  "confusion.   Hematological:  Bruises/bleeds easily.       Objective     /82 (BP Location: Right arm, Patient Position: Sitting, Cuff Size: Adult)   Pulse 102   Resp 18   Ht 182.9 cm (72.01\")   Wt 83.5 kg (184 lb)   SpO2 96%   BMI 24.95 kg/mý     BP Readings from Last 2 Encounters:   10/09/23 142/82   01/16/23 130/96       Wt Readings from Last 2 Encounters:   10/09/23 83.5 kg (184 lb)   03/01/23 83.9 kg (185 lb)       BMI is >= 25 and <30. (Overweight) The following options were offered after discussion;: exercise counseling/recommendations and nutrition counseling/recommendations         Physical Exam  Vitals and nursing note reviewed.   Constitutional:       General: He is not in acute distress.     Appearance: Normal appearance. He is well-groomed and normal weight. He is ill-appearing. He is not toxic-appearing or diaphoretic.   HENT:      Head: Normocephalic and atraumatic.   Eyes:      General: Lids are normal. Vision grossly intact.   Neck:      Vascular: No carotid bruit.   Cardiovascular:      Rate and Rhythm: Normal rate and regular rhythm.      Pulses: Normal pulses.      Heart sounds: Normal heart sounds. No murmur heard.  Pulmonary:      Effort: Pulmonary effort is normal.      Breath sounds: Normal breath sounds and air entry.   Musculoskeletal:      Right elbow: Swelling and laceration present. No deformity. Normal range of motion. No tenderness.        Arms:       Cervical back: Normal, full passive range of motion without pain, normal range of motion and neck supple.      Thoracic back: Normal.      Lumbar back: Normal.      Right upper leg: Swelling and tenderness present.      Right lower leg: Normal. No edema.      Left lower leg: Normal. No edema.        Legs:       Comments: Slight laceration abrasion without drainage with slight edema to right outer aspect of elbow.      Ecchymoses noted to posterior lower aspect of right thigh and correlates with patient's report of falling off " stool.     Skin:     General: Skin is warm and dry.   Neurological:      Mental Status: He is alert and oriented to person, place, and time. Mental status is at baseline.   Psychiatric:         Attention and Perception: Attention normal.         Mood and Affect: Mood and affect normal.         Behavior: Behavior normal. Behavior is cooperative.         Thought Content: Thought content normal.       Physical Exam   Upper extremities         Result Review :   DAXRESULTS  Assessment and Plan         Problems Addressed this Visit          Active Problems    Allergic reaction to alpha-gal    Relevant Medications    diclofenac (VOLTAREN) 75 MG EC tablet     Other Visit Diagnoses       Contusion of right thigh, initial encounter    -  Primary    posterior.    Relevant Orders    XR Hip With or Without Pelvis 2 - 3 View Right (Completed)    Ambulatory Referral to Physical Therapy Evaluate and treat (Completed)    XR Knee 1 or 2 View Right (Completed)    POCT urinalysis dipstick, multipro (Completed)    Acute conjunctivitis of both eyes, unspecified acute conjunctivitis type        Allergic to bees        The patient will be prescribed a refill of his EpiPen.          Diagnoses         Codes Comments    Contusion of right thigh, initial encounter    -  Primary ICD-10-CM: S70.11XA  ICD-9-CM: 924.00 posterior.    Acute conjunctivitis of both eyes, unspecified acute conjunctivitis type     ICD-10-CM: H10.33  ICD-9-CM: 372.00     Allergic reaction to alpha-gal     ICD-10-CM: T78.1XXA  ICD-9-CM: 693.1 The patient will be prescribed a refill of his EpiPen.    Allergic to bees     ICD-10-CM: Z91.030  ICD-9-CM: V15.06 The patient will be prescribed a refill of his EpiPen.           Procedures    Problem List Items Addressed This Visit          Active Problems    Allergic reaction to alpha-gal    Overview     severe         Relevant Medications    diclofenac (VOLTAREN) 75 MG EC tablet     Other Visit Diagnoses       Contusion of right  thigh, initial encounter    -  Primary    posterior.    Relevant Orders    XR Hip With or Without Pelvis 2 - 3 View Right (Completed)    Ambulatory Referral to Physical Therapy Evaluate and treat (Completed)    XR Knee 1 or 2 View Right (Completed)    POCT urinalysis dipstick, multipro (Completed)    Acute conjunctivitis of both eyes, unspecified acute conjunctivitis type        Allergic to bees        The patient will be prescribed a refill of his EpiPen.                 Wrapup Tab  No follow-ups on file.     Patient Instructions   RICE  Rest  Ice   Compression  Elevation    Crutches for ambulation.    [facility] - Eval and treat - [phone].         Continue current plan of care as discussed.   Take medication as ordered (if applicable).    Practice good sleep hygiene.  Eat a well balanced diet with fresh fruit and vegetables.    Drink at least 8 bottles of water or equivalent per day.     Limit sweetened beverages, sodas, juices.    Bake, boil, broil or grill your food, avoid eating fried foods.   Exercise at least 150 minutes per week.          Patient was given instructions and counseling regarding his condition or for health maintenance advice. Please see specific information pulled into the AVS if appropriate.  Hand hygiene was performed during entrance to exam room and following assessment of patient. This document is intended for medical expert use only.     EMR Dragon/Transcription disclaimer:   Much of this encounter note is an electronic transcription/translation of spoken language to printed text. The electronic translation of spoken language may permit erroneous, or at times, nonsensical words or phrases to be inadvertently transcribed.      DOLORES Frost, FNP-C  [address]  [facility]  [address] [place] [address] [address]UR[address]O [address] [address] [phone]    \Transcribed from ambient dictation for DOLORES Frost by Vitaliy Fu.  10/09/23    15:16 EDT    Patient or patient representative verbalized consent to the visit recording.  I have personally performed the services described in this document as transcribed by the above individual, and it is both accurate and complete.

## 2023-10-09 NOTE — PATIENT INSTRUCTIONS
Onset: 05/11/23    Location / description: Patient reports being diagnosed with HFMD two days ago. Patient reports that rash is worsening, now spreading to her legs and increasing sores are appearing. Rash and sores noted to be painful and itchy. Patient reports sores to mouth and tongue with difficulty swallowing and sore throat. Patient also with fever (Tm 101.5F), headache and nausea. Patient denied chest pain, shortness orf breath, dizziness, vomiting or diarrhea.   Precipitating Factors: Patient's daughter recently was diagnosed with HFMD and scarlet fever.   Pain Scale (1-10), 10 highest: 5/10  What improves/worsens symptoms: Warm fluids worsen symptoms.   Symptom specific medications: Tylenol   LMP : Patient's last menstrual period was 10/27/2013.   Are you pregnant or breast feeding: N/A   Recent visits (last 3-4 weeks) for same reason or recent surgery: 05/12/23- ED- HFMD    PLAN:    See Provider/Urgent care in next 4 hours- Advised patient to be evaluated in [facility] today. Patient's  requesting antibiotic sent in for patient as this recently worked for his daughter.  adamant, however patient without PCP at this time.     Patient/Caller agrees to follow recommendations.    Reason for Disposition  • Rash is getting much WORSE (e.g., bleeding from skin, open wounds, spreading to face or groin, very painful)    Protocols used: HAND FOOT AND MOUTH DISEASE - DIAGNOSED OR [id number]       RICE  Rest  Ice   Compression  Elevation    Crutches for ambulation.    [facility] - Eval and treat - [phone].         Continue current plan of care as discussed.   Take medication as ordered (if applicable).    Practice good sleep hygiene.  Eat a well balanced diet with fresh fruit and vegetables.    Drink at least 8 bottles of water or equivalent per day.     Limit sweetened beverages, sodas, juices.    Bake, boil, broil or grill your food, avoid eating fried foods.   Exercise at least 150 minutes per week.

## 2023-10-12 ENCOUNTER — TELEPHONE (OUTPATIENT)
Dept: FAMILY MEDICINE CLINIC | Facility: CLINIC | Age: 43
End: 2023-10-12
Payer: COMMERCIAL

## 2023-10-12 NOTE — TELEPHONE ENCOUNTER
"Caller: Warren Giraldo \"PJ\"    Relationship: Self    Best call back number: [phone]      PATIENT IS CALLING ABOUT HIS [employer] PAPERWORK.    THERE ARE 3 SPECIFIC PAGES IN THIS PACK THAT ALLOWS HIM TO GO BACK TO WORK.    PATIENT IS JUST CHECKING ON THE STATUS OF THIS PAPERWORK AND REQUESTING A   CALLBACK.      "

## 2023-10-15 ENCOUNTER — PATIENT MESSAGE (OUTPATIENT)
Dept: FAMILY MEDICINE CLINIC | Facility: CLINIC | Age: 43
End: 2023-10-15
Payer: COMMERCIAL

## 2023-11-16 RX ORDER — CYCLOBENZAPRINE HCL 5 MG
5 TABLET ORAL DAILY PRN
Qty: 30 TABLET | Refills: 0 | OUTPATIENT
Start: 2023-11-16

## 2023-12-11 RX ORDER — EPINEPHRINE 0.3 MG/.3ML
0.3 INJECTION SUBCUTANEOUS ONCE
Qty: 2 EACH | Refills: 0 | Status: SHIPPED | OUTPATIENT
Start: 2023-12-11 | End: 2023-12-11

## 2023-12-14 RX ORDER — CYCLOBENZAPRINE HCL 5 MG
5 TABLET ORAL DAILY PRN
Qty: 30 TABLET | Refills: 0 | Status: SHIPPED | OUTPATIENT
Start: 2023-12-14

## 2024-01-22 RX ORDER — CYCLOBENZAPRINE HCL 5 MG
5 TABLET ORAL DAILY PRN
Qty: 30 TABLET | Refills: 0 | OUTPATIENT
Start: 2024-01-22

## 2024-01-24 ENCOUNTER — HOSPITAL ENCOUNTER (EMERGENCY)
Facility: HOSPITAL | Age: 44
Discharge: HOME | End: 2024-01-24
Attending: EMERGENCY MEDICINE
Payer: COMMERCIAL

## 2024-01-24 VITALS
BODY MASS INDEX: 25.06 KG/M2 | OXYGEN SATURATION: 93 % | HEART RATE: 110 BPM | WEIGHT: 185 LBS | DIASTOLIC BLOOD PRESSURE: 63 MMHG | SYSTOLIC BLOOD PRESSURE: 107 MMHG | RESPIRATION RATE: 13 BRPM | HEIGHT: 72 IN | TEMPERATURE: 97.5 F

## 2024-01-24 DIAGNOSIS — T78.40XA ALLERGIC REACTION, INITIAL ENCOUNTER: Primary | ICD-10-CM

## 2024-01-24 LAB
ANION GAP SERPL CALCULATED.3IONS-SCNC: 16 MMOL/L (ref 5–15)
BASOPHILS # BLD AUTO: 0.1 10*3/MM3 (ref 0–0.2)
BASOPHILS NFR BLD AUTO: 0.6 % (ref 0–1.5)
BUN SERPL-MCNC: 13 MG/DL (ref 6–20)
BUN/CREAT SERPL: 8.9 (ref 7–25)
CALCIUM SPEC-SCNC: 10.2 MG/DL (ref 8.6–10.5)
CHLORIDE SERPL-SCNC: 103 MMOL/L (ref 98–107)
CO2 SERPL-SCNC: 23 MMOL/L (ref 22–29)
CREAT SERPL-MCNC: 1.46 MG/DL (ref 0.76–1.27)
DEPRECATED RDW RBC AUTO: 44.2 FL (ref 37–54)
EGFRCR SERPLBLD CKD-EPI 2021: 61.6 ML/MIN/1.73
EOSINOPHIL # BLD AUTO: 0.1 10*3/MM3 (ref 0–0.4)
EOSINOPHIL NFR BLD AUTO: 1 % (ref 0.3–6.2)
ERYTHROCYTE [DISTWIDTH] IN BLOOD BY AUTOMATED COUNT: 12.8 % (ref 12.3–15.4)
GLUCOSE SERPL-MCNC: 99 MG/DL (ref 65–99)
HCT VFR BLD AUTO: 47.7 % (ref 37.5–51)
HGB BLD-MCNC: 16.2 G/DL (ref 13–17.7)
LYMPHOCYTES # BLD AUTO: 1.6 10*3/MM3 (ref 0.7–3.1)
LYMPHOCYTES NFR BLD AUTO: 16.8 % (ref 19.6–45.3)
MCH RBC QN AUTO: 31.9 PG (ref 26.6–33)
MCHC RBC AUTO-ENTMCNC: 34 G/DL (ref 31.5–35.7)
MCV RBC AUTO: 93.7 FL (ref 79–97)
MONOCYTES # BLD AUTO: 0.7 10*3/MM3 (ref 0.1–0.9)
MONOCYTES NFR BLD AUTO: 7.4 % (ref 5–12)
NEUTROPHILS NFR BLD AUTO: 7 10*3/MM3 (ref 1.7–7)
NEUTROPHILS NFR BLD AUTO: 74.2 % (ref 42.7–76)
NRBC BLD AUTO-RTO: 0.1 /100 WBC (ref 0–0.2)
PLATELET # BLD AUTO: 302 10*3/MM3 (ref 140–450)
PMV BLD AUTO: 8.2 FL (ref 6–12)
POTASSIUM SERPL-SCNC: 4 MMOL/L (ref 3.5–5.2)
RBC # BLD AUTO: 5.09 10*6/MM3 (ref 4.14–5.8)
SODIUM SERPL-SCNC: 142 MMOL/L (ref 136–145)
WBC NRBC COR # BLD AUTO: 9.4 10*3/MM3 (ref 3.4–10.8)

## 2024-01-24 PROCEDURE — 96375 TX/PRO/DX INJ NEW DRUG ADDON: CPT

## 2024-01-24 PROCEDURE — 99283 EMERGENCY DEPT VISIT LOW MDM: CPT

## 2024-01-24 PROCEDURE — 25810000003 SODIUM CHLORIDE 0.9 % SOLUTION

## 2024-01-24 PROCEDURE — 94799 UNLISTED PULMONARY SVC/PX: CPT

## 2024-01-24 PROCEDURE — 25010000002 MIDAZOLAM PER 1 MG: Performed by: EMERGENCY MEDICINE

## 2024-01-24 PROCEDURE — 93005 ELECTROCARDIOGRAM TRACING: CPT | Performed by: EMERGENCY MEDICINE

## 2024-01-24 PROCEDURE — 80048 BASIC METABOLIC PNL TOTAL CA: CPT

## 2024-01-24 PROCEDURE — 94640 AIRWAY INHALATION TREATMENT: CPT

## 2024-01-24 PROCEDURE — 85025 COMPLETE CBC W/AUTO DIFF WBC: CPT

## 2024-01-24 PROCEDURE — 25010000002 ONDANSETRON PER 1 MG: Performed by: EMERGENCY MEDICINE

## 2024-01-24 PROCEDURE — 25010000002 DIPHENHYDRAMINE PER 50 MG

## 2024-01-24 PROCEDURE — 25810000003 LACTATED RINGERS SOLUTION: Performed by: EMERGENCY MEDICINE

## 2024-01-24 PROCEDURE — 96374 THER/PROPH/DIAG INJ IV PUSH: CPT

## 2024-01-24 RX ORDER — EPINEPHRINE 0.3 MG/.3ML
0.3 INJECTION SUBCUTANEOUS ONCE
Qty: 1 EACH | Refills: 0 | Status: SHIPPED | OUTPATIENT
Start: 2024-01-24 | End: 2024-01-24

## 2024-01-24 RX ORDER — SODIUM CHLORIDE 0.9 % (FLUSH) 0.9 %
10 SYRINGE (ML) INJECTION AS NEEDED
Status: DISCONTINUED | OUTPATIENT
Start: 2024-01-24 | End: 2024-01-24 | Stop reason: HOSPADM

## 2024-01-24 RX ORDER — ALBUTEROL SULFATE 90 UG/1
2 AEROSOL, METERED RESPIRATORY (INHALATION) EVERY 6 HOURS PRN
Qty: 6.7 G | Refills: 0 | Status: SHIPPED | OUTPATIENT
Start: 2024-01-24

## 2024-01-24 RX ORDER — FAMOTIDINE 10 MG/ML
20 INJECTION, SOLUTION INTRAVENOUS ONCE
Status: COMPLETED | OUTPATIENT
Start: 2024-01-24 | End: 2024-01-24

## 2024-01-24 RX ORDER — MIDAZOLAM HYDROCHLORIDE 1 MG/ML
2 INJECTION INTRAMUSCULAR; INTRAVENOUS ONCE
Status: COMPLETED | OUTPATIENT
Start: 2024-01-24 | End: 2024-01-24

## 2024-01-24 RX ORDER — IPRATROPIUM BROMIDE AND ALBUTEROL SULFATE 2.5; .5 MG/3ML; MG/3ML
3 SOLUTION RESPIRATORY (INHALATION) ONCE
Status: COMPLETED | OUTPATIENT
Start: 2024-01-24 | End: 2024-01-24

## 2024-01-24 RX ORDER — ONDANSETRON 2 MG/ML
8 INJECTION INTRAMUSCULAR; INTRAVENOUS ONCE
Status: COMPLETED | OUTPATIENT
Start: 2024-01-24 | End: 2024-01-24

## 2024-01-24 RX ORDER — PREDNISONE 50 MG/1
50 TABLET ORAL DAILY
Qty: 5 TABLET | Refills: 0 | Status: SHIPPED | OUTPATIENT
Start: 2024-01-24

## 2024-01-24 RX ORDER — DIPHENHYDRAMINE HYDROCHLORIDE 50 MG/ML
25 INJECTION INTRAMUSCULAR; INTRAVENOUS ONCE
Status: COMPLETED | OUTPATIENT
Start: 2024-01-24 | End: 2024-01-24

## 2024-01-24 RX ADMIN — SODIUM CHLORIDE, POTASSIUM CHLORIDE, SODIUM LACTATE AND CALCIUM CHLORIDE 1000 ML: 600; 310; 30; 20 INJECTION, SOLUTION INTRAVENOUS at 16:59

## 2024-01-24 RX ADMIN — FAMOTIDINE 20 MG: 10 INJECTION INTRAVENOUS at 15:33

## 2024-01-24 RX ADMIN — MIDAZOLAM 2 MG: 1 INJECTION INTRAMUSCULAR; INTRAVENOUS at 15:40

## 2024-01-24 RX ADMIN — IPRATROPIUM BROMIDE AND ALBUTEROL SULFATE 3 ML: .5; 3 SOLUTION RESPIRATORY (INHALATION) at 17:45

## 2024-01-24 RX ADMIN — SODIUM CHLORIDE 1000 ML: 9 INJECTION, SOLUTION INTRAVENOUS at 15:33

## 2024-01-24 RX ADMIN — DIPHENHYDRAMINE HYDROCHLORIDE 25 MG: 50 INJECTION, SOLUTION INTRAMUSCULAR; INTRAVENOUS at 15:33

## 2024-01-24 RX ADMIN — ONDANSETRON 8 MG: 2 INJECTION INTRAMUSCULAR; INTRAVENOUS at 15:40

## 2024-01-24 NOTE — Clinical Note
[facility] EMERGENCY DEPARTMENT  [address] [place] [address]  Phone: [phone]    Warren Giraldo was seen and treated in our emergency department on 1/24/2024.  He may return to work on 01/25/2024.         Thank you for choosing [facility].    Nestor Hawk MD

## 2024-01-24 NOTE — ED PROVIDER NOTES
Subjective     Provider in Triage Note  Initial evaluation was completed in the Pit due to high patient volume.  Patient is a 43-year-old male who has a known history of alpha gal reaction states that he was in multiple houses today and someone was cooking shortly following this he became itchy and developed hives he also had some shortness of breath he administered 2 EpiPen's and an inhaler treatment he is now shaky and feels that he is having allergic reaction to something.     History of Present Illness  Chief complaint allergic reaction    History of present illness provider in triage note reviewed and agreed.  43-year-old male with a history of alpha gal who states he went into somebody's house and there was some smell of meat or exposure he did not eat but he suddenly started having trouble breathing tightening in his throat and a rash and itching.  He gave himself 2 epi shots.  He states that now his heart racing he still feels short of breath his hives and itching have improved.  He denies any other exposures no recent illness flus viruses vaccinations no chest pain neck arm jaw pain.  No cough congestion no leg pain or swelling no recent long car ride plane ride immobilization or foreign travels.  Seem to be triggered when he walked in his house with the smell that he came across and gave 2 epis heart racing but otherwise itching is better but still feels tight in his throat.        Review of Systems   Constitutional:  Negative for chills and fever.   Eyes:  Negative for photophobia and visual disturbance.   Respiratory:  Positive for chest tightness, shortness of breath and wheezing. Negative for stridor.    Cardiovascular:  Positive for palpitations. Negative for chest pain.   Gastrointestinal:  Negative for abdominal pain and vomiting.   Skin:  Positive for rash.   Neurological:  Positive for light-headedness. Negative for dizziness, syncope, facial asymmetry and speech difficulty.    Psychiatric/Behavioral:  Negative for confusion.        Past Medical History:   Diagnosis Date    Acne varioliformis     Allergic     Allergic rhinitis     Anxiety     Costochondritis 03/04/2013    Depression     Gastroesophageal reflux disease with esophagitis     History of kidney stones     Migraine with aura     PUD (peptic ulcer disease)        Allergies   Allergen Reactions    Bee Venom Anaphylaxis    Beef-Derived Products Anaphylaxis     Alpha-gal    Dairy Aid [Tilactase] Anaphylaxis    Latex Anaphylaxis    Meat Extract Anaphylaxis    Milk Protein Anaphylaxis     Alpha-Gal    Other Anaphylaxis     Has Alpha gal so any mammal products    Pork-Derived Products Anaphylaxis     Alpha-gal      Zinc Gelatin [Zinc] Anaphylaxis       Past Surgical History:   Procedure Laterality Date    APPENDECTOMY      CHOLECYSTECTOMY      HYDROCELE EXCISION / REPAIR Left        Family History   Problem Relation Age of Onset    Diabetes Father         passed away due to complications    Coronary artery disease Father     Depression Mother     Diabetes Mother     Mental illness Neg Hx     Alcohol abuse Neg Hx     Drug abuse Neg Hx        Social History     Socioeconomic History    Marital status:    Tobacco Use    Smoking status: Never    Smokeless tobacco: Never   Vaping Use    Vaping Use: Never used   Substance and Sexual Activity    Alcohol use: Yes     Alcohol/week: 4.0 standard drinks of alcohol     Types: 4 Cans of beer per week    Drug use: Never    Sexual activity: Yes     Partners: Female     Prior to Admission medications    Medication Sig Start Date End Date Taking? Authorizing Provider   albuterol sulfate  (90 Base) MCG/ACT inhaler Inhale 2 puffs Every 6 (Six) Hours As Needed for Shortness of Air or Wheezing. 1/24/24   Nestor aHwk MD   busPIRone (BUSPAR) 15 MG tablet  10/10/22   ProviderSarah MD   cetirizine (zyrTEC) 10 MG tablet Take 1 tablet by mouth Daily.    Sarah Real MD    cholecalciferol (VITAMIN D3) 25 MCG (1000 UT) tablet Take 1 tablet by mouth Daily. vegan    Sarah Real MD   cyclobenzaprine (FLEXERIL) 5 MG tablet TAKE 1 TABLET BY MOUTH DAILY AS NEEDED FOR MUSCLE SPASMS. 12/14/23   Baldev Sandy MD   diclofenac (VOLTAREN) 75 MG EC tablet Take 1 tablet by mouth 2 (Two) Times a Day. 10/9/23   Domitila Barr APRN   EPINEPHrine (EPIPEN) 0.3 MG/0.3ML solution auto-injector injection Inject 0.3 mL under the skin into the appropriate area as directed 1 (One) Time for 1 dose. 1/24/24 1/24/24  Nestor Hawk MD   famotidine (PEPCID) 40 MG tablet Take 1 tablet by mouth 2 (two) times a day.    Sarah Real MD   lamoTRIgine (LaMICtal) 150 MG tablet  10/10/22   Sarah Real MD   levalbuterol (XOPENEX HFA) 45 MCG/ACT inhaler Inhale 1-2 puffs Every 4 (Four) Hours As Needed for Wheezing. 8/4/21   Sarbjit Goyal MD   LORazepam (ATIVAN) 1 MG tablet Take 1 tablet by mouth 2 (Two) Times a Day As Needed for Anxiety.    Sarah Real MD   multivitamin with minerals tablet tablet Take 1 tablet by mouth Daily.    Sarah Real MD   predniSONE (DELTASONE) 50 MG tablet Take 1 tablet by mouth Daily. 1/24/24   Nestor Hawk MD   QUEtiapine (SEROquel) 100 MG tablet Take 2 tablets by mouth Every Morning.    Sarah Real MD   QUEtiapine (SEROquel) 400 MG tablet Take 1 tablet by mouth Every Night.    Sarah Real MD   tadalafil (Cialis) 5 MG tablet Take 1 tablet by mouth Daily As Needed for Erectile Dysfunction. 8/28/23   Baldev Sandy MD   venlafaxine 225 MG tablet sustained-release 24 hour 24 hr tablet Take 1 tablet by mouth Every Morning.    Sarah Real MD   Patient is not on prednisone      Objective   Physical Exam  Constitutional this is a 43-year-old male awake alert very anxious appearing triage vital signs reviewed heart rate 150.  But a sinus tach on the monitor.  Otherwise vital signs stable  sats on the monitor when I am standing next to the patient are 98%.  This is room air.  HEENT extraocular muscles are intact pupils equal round reactive sclera clear the mouth clear tongue normal back of throat normal uvula midline no trismus no swelling to floor of mouth no stridor no drooling speech is normal no angioedema neck supple no adenopathy no JV no bruits no pain to the anterior neck no redness or swelling lungs clear no retraction no use of accessory heart regular tachycardic without murmur abdomen soft nontender good bowel sounds no peritoneal findings extremities pulses equal throughout upper lower extremities no edema no cords no Homans' sign skin warm and dry with no rashes no hives no petechiae no purpura neurologic he is awake alert he follows commands no facial asymmetry speech normal without focal weakness  Procedures           ED Course      Results for orders placed or performed during the hospital encounter of 01/24/24   Basic Metabolic Panel    Specimen: Blood   Result Value Ref Range    Glucose 99 65 - 99 mg/dL    BUN 13 6 - 20 mg/dL    Creatinine 1.46 (H) 0.76 - 1.27 mg/dL    Sodium 142 136 - 145 mmol/L    Potassium 4.0 3.5 - 5.2 mmol/L    Chloride 103 98 - 107 mmol/L    CO2 23.0 22.0 - 29.0 mmol/L    Calcium 10.2 8.6 - 10.5 mg/dL    BUN/Creatinine Ratio 8.9 7.0 - 25.0    Anion Gap 16.0 (H) 5.0 - 15.0 mmol/L    eGFR 61.6 >60.0 mL/min/1.73   CBC Auto Differential    Specimen: Blood   Result Value Ref Range    WBC 9.40 3.40 - 10.80 10*3/mm3    RBC 5.09 4.14 - 5.80 10*6/mm3    Hemoglobin 16.2 13.0 - 17.7 g/dL    Hematocrit 47.7 37.5 - 51.0 %    MCV 93.7 79.0 - 97.0 fL    MCH 31.9 26.6 - 33.0 pg    MCHC 34.0 31.5 - 35.7 g/dL    RDW 12.8 12.3 - 15.4 %    RDW-SD 44.2 37.0 - 54.0 fl    MPV 8.2 6.0 - 12.0 fL    Platelets 302 140 - 450 10*3/mm3    Neutrophil % 74.2 42.7 - 76.0 %    Lymphocyte % 16.8 (L) 19.6 - 45.3 %    Monocyte % 7.4 5.0 - 12.0 %    Eosinophil % 1.0 0.3 - 6.2 %    Basophil % 0.6  0.0 - 1.5 %    Neutrophils, Absolute 7.00 1.70 - 7.00 10*3/mm3    Lymphocytes, Absolute 1.60 0.70 - 3.10 10*3/mm3    Monocytes, Absolute 0.70 0.10 - 0.90 10*3/mm3    Eosinophils, Absolute 0.10 0.00 - 0.40 10*3/mm3    Basophils, Absolute 0.10 0.00 - 0.20 10*3/mm3    nRBC 0.1 0.0 - 0.2 /100 WBC   ECG 12 Lead Chest Pain   Result Value Ref Range    QT Interval 311 ms    QTC Interval 438 ms     No radiology results for the last day  Medications   sodium chloride 0.9 % bolus 1,000 mL (0 mL Intravenous Stopped 1/24/24 1659)   famotidine (PEPCID) injection 20 mg (20 mg Intravenous Given 1/24/24 1533)   diphenhydrAMINE (BENADRYL) injection 25 mg (25 mg Intravenous Given 1/24/24 1533)   lactated ringers bolus 1,000 mL (0 mL Intravenous Stopped 1/24/24 1828)   ondansetron (ZOFRAN) injection 8 mg (8 mg Intravenous Given 1/24/24 1540)   midazolam (VERSED) injection 2 mg (2 mg Intravenous Given 1/24/24 1540)   ipratropium-albuterol (DUO-NEB) nebulizer solution 3 mL (3 mL Nebulization Given 1/24/24 1745)                                       EKG my interpretation sinus tachycardia rate of 119 normal axis hypertrophy QTc of 438 normal EKG other than sinus tach but no change from 6/10/2022       Medical Decision Making  Medical decision making.  IV established monitor placement review of sinus tachycardia given a liter of lactated Ringer's he was given Versed 2 mg IV and Zofran 8 mg IV Pepcid 20 mg IV Benadryl 25 mg IV he has already had 2 epi shots prior to arrival.  Labs obtained independent reviewed by me CBC and basic metabolic profile unremarkable and a creatinine 1.46.  Patient was observed in ER for an extended period of time he had been given a DuoNeb as well.  He was feeling much better heart rate came down to about 100-110 a sinus tach his lungs were clear his tongue and throat were normal.  No hives no rash he is mentating well awake and alert good cap refill.  The patient looks to be has had a significant allergic  reaction although he is had no hypotension he has been tachycardic the patient injected himself twice with epinephrine.  Can consider an intravascular injection by accident because of the tachycardia at 150.  I do not see evidence just acute myocardial infarction atrial fibrillation SVT DVT pulmonary embolism aortic dissection pericarditis myocarditis pericardial effusion.  There is no murmur there is no rub his lungs are clear there is no leg pain or swelling no cords or Homans' sign.  He is feeling much better.  We talked about observation and watching him for recurrences we did talk about potential recurrence.  He states he is ready go home his wife will watch him.  We refilled his epi pen and his albuterol inhaler with him on prednisone he is already on antihistamine he will follow-up with his primary care doctor and if he gets worse or has recurrence he will return to the ER.  Stable otherwise unremarkable ER course.  Improved    Problems Addressed:  Allergic reaction, initial encounter: complicated acute illness or injury    Amount and/or Complexity of Data Reviewed  Labs: ordered. Decision-making details documented in ED Course.  ECG/medicine tests: ordered and independent interpretation performed. Decision-making details documented in ED Course.    Risk  Prescription drug management.        Final diagnoses:   Allergic reaction, initial encounter       ED Disposition  ED Disposition       ED Disposition   Discharge    Condition   Stable    Comment   --               Baldev Sandy MD  [address] [address] DR MURILLO  [facility] [address]  [place] [facility]  [phone]    In 1 day           Medication List        New Prescriptions      albuterol sulfate  (90 Base) MCG/ACT inhaler  Commonly known as: PROVENTIL HFA;VENTOLIN HFA;PROAIR HFA  Inhale 2 puffs Every 6 (Six) Hours As Needed for Shortness of Air or Wheezing.     EPINEPHrine 0.3 MG/0.3ML solution auto-injector injection  Commonly known as: EPIPEN  Inject 0.3 mL  under the skin into the appropriate area as directed 1 (One) Time for 1 dose.     predniSONE 50 MG tablet  Commonly known as: DELTASONE  Take 1 tablet by mouth Daily.               Where to Get Your Medications        These medications were sent to [facility] [address] [place] [employer] - [facility] [place] - [address] [facility] - [phone]  - [phone] FX  [address] [facility] [place] [address]      Phone: [phone]   albuterol sulfate  (90 Base) MCG/ACT inhaler  EPINEPHrine 0.3 MG/0.3ML solution auto-injector injection  predniSONE 50 MG tablet            Nestor Hawk MD  01/24/24 8873

## 2024-01-24 NOTE — ED NOTES
Pt presents to ED stating he is allergic to all mammals and was at someones house where they were cooking something and he started to have an allergic reaction. Pt states he gave himself two shots of his epi pens. Pt presents diaphoretic, shaking, nauseaus, anxious. Pt airway is patent and pt is speaking clearly. Family at bedside.

## 2024-01-25 LAB
QT INTERVAL: 311 MS
QTC INTERVAL: 438 MS

## 2024-01-25 NOTE — DISCHARGE INSTRUCTIONS
Rest tonight plenty fluids.  EpiPen prednisone albuterol inhaler sent to your pharmacy.  Continue antihistamine.  Return for reoccurrence of symptoms tongue or throat swelling shortness of breath chest pain elevated heart rate dizziness passing out rash or any other new or worsening problems or concerns return immediately to the ER.

## 2024-02-14 ENCOUNTER — PATIENT MESSAGE (OUTPATIENT)
Dept: FAMILY MEDICINE CLINIC | Facility: CLINIC | Age: 44
End: 2024-02-14
Payer: COMMERCIAL

## 2024-02-14 DIAGNOSIS — N52.9 ERECTILE DYSFUNCTION, UNSPECIFIED ERECTILE DYSFUNCTION TYPE: ICD-10-CM

## 2024-02-14 RX ORDER — TADALAFIL 5 MG/1
5 TABLET ORAL DAILY PRN
Qty: 30 TABLET | Refills: 5 | OUTPATIENT
Start: 2024-02-14

## 2024-02-15 RX ORDER — TADALAFIL 5 MG/1
5 TABLET ORAL DAILY PRN
Qty: 30 TABLET | Refills: 5 | Status: SHIPPED | OUTPATIENT
Start: 2024-02-15

## 2024-03-11 ENCOUNTER — HOSPITAL ENCOUNTER (EMERGENCY)
Facility: HOSPITAL | Age: 44
Discharge: HOME | End: 2024-03-11
Attending: EMERGENCY MEDICINE | Admitting: EMERGENCY MEDICINE
Payer: COMMERCIAL

## 2024-03-11 VITALS
WEIGHT: 171.96 LBS | SYSTOLIC BLOOD PRESSURE: 108 MMHG | OXYGEN SATURATION: 100 % | HEIGHT: 66 IN | BODY MASS INDEX: 27.64 KG/M2 | TEMPERATURE: 97.7 F | HEART RATE: 122 BPM | DIASTOLIC BLOOD PRESSURE: 82 MMHG | RESPIRATION RATE: 22 BRPM

## 2024-03-11 DIAGNOSIS — T78.40XA ALLERGIC REACTION, INITIAL ENCOUNTER: Primary | ICD-10-CM

## 2024-03-11 LAB
QT INTERVAL: 320 MS
QTC INTERVAL: 458 MS
WHOLE BLOOD HOLD COAG: NORMAL
WHOLE BLOOD HOLD SPECIMEN: NORMAL

## 2024-03-11 PROCEDURE — 99283 EMERGENCY DEPT VISIT LOW MDM: CPT

## 2024-03-11 PROCEDURE — 96374 THER/PROPH/DIAG INJ IV PUSH: CPT

## 2024-03-11 PROCEDURE — 25010000002 METHYLPREDNISOLONE PER 125 MG: Performed by: PHYSICIAN ASSISTANT

## 2024-03-11 PROCEDURE — 96372 THER/PROPH/DIAG INJ SC/IM: CPT

## 2024-03-11 PROCEDURE — 25810000003 SODIUM CHLORIDE 0.9 % SOLUTION: Performed by: NURSE PRACTITIONER

## 2024-03-11 PROCEDURE — 25010000002 DIPHENHYDRAMINE PER 50 MG: Performed by: PHYSICIAN ASSISTANT

## 2024-03-11 PROCEDURE — 93005 ELECTROCARDIOGRAM TRACING: CPT | Performed by: EMERGENCY MEDICINE

## 2024-03-11 PROCEDURE — 25010000002 EPINEPHRINE (ANAPHYLAXIS) 1 MG/ML SOLUTION: Performed by: PHYSICIAN ASSISTANT

## 2024-03-11 PROCEDURE — 96375 TX/PRO/DX INJ NEW DRUG ADDON: CPT

## 2024-03-11 PROCEDURE — 25010000002 DIAZEPAM PER 5 MG: Performed by: NURSE PRACTITIONER

## 2024-03-11 RX ORDER — SODIUM CHLORIDE 0.9 % (FLUSH) 0.9 %
10 SYRINGE (ML) INJECTION AS NEEDED
Status: DISCONTINUED | OUTPATIENT
Start: 2024-03-11 | End: 2024-03-11 | Stop reason: HOSPADM

## 2024-03-11 RX ORDER — FAMOTIDINE 10 MG/ML
20 INJECTION, SOLUTION INTRAVENOUS ONCE
Status: COMPLETED | OUTPATIENT
Start: 2024-03-11 | End: 2024-03-11

## 2024-03-11 RX ORDER — DIAZEPAM 5 MG/ML
5 INJECTION, SOLUTION INTRAMUSCULAR; INTRAVENOUS EVERY 4 HOURS PRN
Status: DISCONTINUED | OUTPATIENT
Start: 2024-03-11 | End: 2024-03-11 | Stop reason: HOSPADM

## 2024-03-11 RX ORDER — EPINEPHRINE 0.3 MG/.3ML
0.3 INJECTION SUBCUTANEOUS ONCE
Qty: 1 EACH | Refills: 0 | Status: SHIPPED | OUTPATIENT
Start: 2024-03-11 | End: 2024-03-11

## 2024-03-11 RX ORDER — METHYLPREDNISOLONE 4 MG/1
TABLET ORAL
Qty: 21 TABLET | Refills: 0 | Status: SHIPPED | OUTPATIENT
Start: 2024-03-11

## 2024-03-11 RX ORDER — DIPHENHYDRAMINE HYDROCHLORIDE 50 MG/ML
25 INJECTION INTRAMUSCULAR; INTRAVENOUS ONCE
Status: COMPLETED | OUTPATIENT
Start: 2024-03-11 | End: 2024-03-11

## 2024-03-11 RX ORDER — METHYLPREDNISOLONE SODIUM SUCCINATE 125 MG/2ML
125 INJECTION, POWDER, LYOPHILIZED, FOR SOLUTION INTRAMUSCULAR; INTRAVENOUS ONCE
Status: COMPLETED | OUTPATIENT
Start: 2024-03-11 | End: 2024-03-11

## 2024-03-11 RX ORDER — EPINEPHRINE 1 MG/ML
0.3 INJECTION, SOLUTION INTRAMUSCULAR; SUBCUTANEOUS ONCE
Status: COMPLETED | OUTPATIENT
Start: 2024-03-11 | End: 2024-03-11

## 2024-03-11 RX ADMIN — METHYLPREDNISOLONE SODIUM SUCCINATE 125 MG: 125 INJECTION, POWDER, FOR SOLUTION INTRAMUSCULAR; INTRAVENOUS at 15:09

## 2024-03-11 RX ADMIN — DIAZEPAM 5 MG: 5 INJECTION INTRAMUSCULAR; INTRAVENOUS at 15:50

## 2024-03-11 RX ADMIN — SODIUM CHLORIDE 1000 ML: 0.9 INJECTION, SOLUTION INTRAVENOUS at 15:17

## 2024-03-11 RX ADMIN — DIPHENHYDRAMINE HYDROCHLORIDE 25 MG: 50 INJECTION, SOLUTION INTRAMUSCULAR; INTRAVENOUS at 15:07

## 2024-03-11 RX ADMIN — EPINEPHRINE 0.3 MG: 1 INJECTION INTRAMUSCULAR; INTRAVENOUS; SUBCUTANEOUS at 15:12

## 2024-03-11 RX ADMIN — FAMOTIDINE 20 MG: 10 INJECTION INTRAVENOUS at 15:10

## 2024-03-11 NOTE — Clinical Note
[facility] EMERGENCY DEPARTMENT  [address] [facility] [place] [address]  Phone: [phone]    Warren Giraldo was seen and treated in our emergency department on 3/11/2024.  He may return to work on 03/15/2024.         Thank you for choosing [facility].    Nia Muller APRN

## 2024-03-11 NOTE — ED NOTES
After administering dose of IM epi, pt became disphoretic, nauseated,and c/o chest pain, stating that he has never felt like this before.  Hr of 140. Rapid called, Deanna Muller NP ordered 1000 bolus of NS. Pt starting to calm, vitals coming down/.

## 2024-03-11 NOTE — DISCHARGE INSTRUCTIONS
Continue current home medications including Unisom or Benadryl.  Take steroids as prescribed.  Follow-up with your allergist and/or primary care provider.  Return for new or worsening symptoms.

## 2024-03-11 NOTE — ED PROVIDER NOTES
Subjective Due to significant overcrowding in the emergency department patient was initially seen and evaluated in triage.  Provider in triage recommended patient placement in the treatment area to initiate therapy and movement to an ER bed as soon as possible.    Provider in Triage Note  Patient is a 43-year-old male who presents with lip and facial swelling.  He has a history of alpha gal syndrome.  He has not had anything to eat or today so he is not sure what caused his reaction.  But has been worsening.      History of Present Illness  Provider triage note reviewed and agree with above.  Additionally, patient states he did take Unisom and his EpiPen about an hour prior to arrival to the ED but reports continued symptoms.  Reports pruritic rash all over along with swelling of his lip and a strange feeling in his throat.        Review of Systems   Respiratory:  Positive for chest tightness and shortness of breath.    Skin:  Positive for rash.       Past Medical History:   Diagnosis Date    Acne varioliformis     Allergic     Allergic rhinitis     Anxiety     Costochondritis 03/04/2013    Depression     Gastroesophageal reflux disease with esophagitis     History of kidney stones     Migraine with aura     PUD (peptic ulcer disease)        Allergies   Allergen Reactions    Bee Venom Anaphylaxis    Beef-Derived Products Anaphylaxis     Alpha-gal    Dairy Aid [Tilactase] Anaphylaxis    Latex Anaphylaxis    Meat Extract Anaphylaxis    Milk Protein Anaphylaxis     Alpha-Gal    Other Anaphylaxis     Has Alpha gal so any mammal products    Pork-Derived Products Anaphylaxis     Alpha-gal      Zinc Gelatin [Zinc] Anaphylaxis       Past Surgical History:   Procedure Laterality Date    APPENDECTOMY      CHOLECYSTECTOMY      HYDROCELE EXCISION / REPAIR Left        Family History   Problem Relation Age of Onset    Diabetes Father         passed away due to complications    Coronary artery disease Father     Depression Mother      Diabetes Mother     Mental illness Neg Hx     Alcohol abuse Neg Hx     Drug abuse Neg Hx        Social History     Socioeconomic History    Marital status:    Tobacco Use    Smoking status: Never    Smokeless tobacco: Never   Vaping Use    Vaping status: Never Used   Substance and Sexual Activity    Alcohol use: Yes     Alcohol/week: 4.0 standard drinks of alcohol     Types: 4 Cans of beer per week    Drug use: Never    Sexual activity: Yes     Partners: Female           Objective   Physical Exam  Vital signs and triage nurse note reviewed.  Constitutional: Awake, alert; well-developed and well-nourished. No acute distress is noted.  Appears mildly anxious.  HEENT: Normocephalic, atraumatic; pupils are PERRL with intact EOM; oropharynx is pink and moist without exudate or erythema.  No drooling or pooling of oral secretions.  Mild swelling noted of the lips, tongue appears to be normal at this time.  Neck: Supple, full range of motion without pain; no cervical lymphadenopathy. Normal phonation.  Cardiovascular: Tachycardic rate and rhythm, normal S1-S2.  No murmur noted.  Pulmonary: Respiratory effort regular nonlabored, breath sounds clear to auscultation all fields.  Abdomen: Soft, nontender, nondistended with normoactive bowel sounds; no rebound or guarding.  Musculoskeletal: Independent range of motion of all extremities with no palpable tenderness or edema.  Neuro: Alert oriented x3, speech is clear and appropriate, GCS 15.    Skin: Flesh tone, warm, dry.  Urticarial rash noted over the body.    Procedures           ED Course      Labs Reviewed   RAINBOW DRAW    Narrative:     The following orders were created for panel order [place] Draw.  Procedure                               Abnormality         Status                     ---------                               -----------         ------                     Green Top (Gel)[[id number]]                                  Final result                Lavender Top[[id number]]                                     Final result               Gold Top - SST[[id number]]                                   Final result               Light Blue Top[[id number]]                                   Final result                 Please view results for these tests on the individual orders.   GREEN TOP   LAVENDER TOP   GOLD TOP - SST   LIGHT BLUE TOP     No radiology results for the last day  Medications   sodium chloride 0.9 % flush 10 mL (has no administration in time range)   diazePAM (VALIUM) injection 5 mg (5 mg Intravenous Given 3/11/24 1550)   diphenhydrAMINE (BENADRYL) injection 25 mg (25 mg Intravenous Given 3/11/24 1507)   famotidine (PEPCID) injection 20 mg (20 mg Intravenous Given 3/11/24 1510)   methylPREDNISolone sodium succinate (SOLU-Medrol) injection 125 mg (125 mg Intravenous Given 3/11/24 1509)   EPINEPHrine (Anaphylaxis) (ADRENALIN) injection 0.3 mg (0.3 mg Intramuscular Given 3/11/24 1512)   sodium chloride 0.9 % bolus 1,000 mL (0 mL Intravenous Stopped 3/11/24 1748)                                            Medical Decision Making  Patient presents today with the above complaint.    He had the above exam and evaluation.  He is placed on continuous cardiac monitor.  IV was established.  He was given a liter of IV fluids, subcu epi, IV Benadryl Pepcid and Solu-Medrol.    Immediately after receiving IV Benadryl Pepcid and Solu-Medrol, patient reports increasing chest tightness and severe headache, his heart rate increased to 140.  He became very anxious.  He was given dose of IV Valium.    Patient was observed for 3 hours and had improvement in his symptoms though not complete resolution.  He states he is feeling better.  He was offered admission to observation for further monitoring but states he would like to go home.  He will be discharged home with prescription for Medrol Dosepak and instructed to continue taking Benadryl or Unisom.  He was given warning  signs for prompt return to the ED and voiced understanding.        Problems Addressed:  Allergic reaction, initial encounter: complicated acute illness or injury    Amount and/or Complexity of Data Reviewed  ECG/medicine tests: ordered.    Risk  Prescription drug management.        Final diagnoses:   Allergic reaction, initial encounter       ED Disposition  ED Disposition       ED Disposition   Discharge    Condition   Stable    Comment   --               Baldev Sandy MD  [address] [place] [address] [address]  [place] [address]  [phone]          Your allergist               Medication List        New Prescriptions      methylPREDNISolone 4 MG dose pack  Commonly known as: MEDROL  Take as directed on package instructions.               Where to Get Your Medications        These medications were sent to [facility] [id number] [place] [employer] - [facility] [place] - [address] [facility] - [phone] [place] [phone] FX  [address] [facility] [place] [address]      Phone: [phone]   methylPREDNISolone 4 MG dose pack            Nia Muller APRN  03/11/24 9134

## 2024-03-11 NOTE — Clinical Note
[facility] EMERGENCY DEPARTMENT  [address] [facility] [place] [address]  Phone: [phone]    Warren Giraldo was seen and treated in our emergency department on 3/11/2024.  He may return to work on 03/15/2024.         Thank you for choosing [facility].    Nia Muller APRN

## 2024-04-04 ENCOUNTER — PATIENT MESSAGE (OUTPATIENT)
Dept: FAMILY MEDICINE CLINIC | Facility: CLINIC | Age: 44
End: 2024-04-04
Payer: COMMERCIAL

## 2024-04-04 RX ORDER — CYCLOBENZAPRINE HCL 5 MG
5 TABLET ORAL DAILY PRN
Qty: 30 TABLET | Refills: 0 | Status: SHIPPED | OUTPATIENT
Start: 2024-04-04

## 2024-04-04 RX ORDER — CYCLOBENZAPRINE HCL 5 MG
5 TABLET ORAL DAILY PRN
Qty: 30 TABLET | Refills: 0 | OUTPATIENT
Start: 2024-04-04

## 2024-04-15 ENCOUNTER — TRANSCRIBE ORDERS (OUTPATIENT)
Dept: LAB | Facility: HOSPITAL | Age: 44
End: 2024-04-15
Payer: COMMERCIAL

## 2024-04-15 ENCOUNTER — LAB (OUTPATIENT)
Dept: LAB | Facility: HOSPITAL | Age: 44
End: 2024-04-15
Payer: COMMERCIAL

## 2024-04-15 DIAGNOSIS — T78.2XXA: ICD-10-CM

## 2024-04-15 DIAGNOSIS — L50.1 IDIOPATHIC URTICARIA: ICD-10-CM

## 2024-04-15 DIAGNOSIS — J30.89 ALLERGY TO DUST: ICD-10-CM

## 2024-04-15 DIAGNOSIS — J30.1 ALLERGIC RHINITIS DUE TO POLLEN, UNSPECIFIED SEASONALITY: Primary | ICD-10-CM

## 2024-04-15 DIAGNOSIS — J30.1 ALLERGIC RHINITIS DUE TO POLLEN, UNSPECIFIED SEASONALITY: ICD-10-CM

## 2024-04-15 DIAGNOSIS — Z91.018 ALLERGY TO OTHER FOODS: ICD-10-CM

## 2024-04-15 DIAGNOSIS — T78.3XXA ANGIO-EDEMA, INITIAL ENCOUNTER: ICD-10-CM

## 2024-04-15 DIAGNOSIS — R06.00 DYSPNEA, UNSPECIFIED TYPE: ICD-10-CM

## 2024-04-15 LAB
ALBUMIN SERPL-MCNC: 5.1 G/DL (ref 3.5–5.2)
ALBUMIN/GLOB SERPL: 2.2 G/DL
ALP SERPL-CCNC: 69 U/L (ref 39–117)
ALT SERPL W P-5'-P-CCNC: 36 U/L (ref 1–41)
ANION GAP SERPL CALCULATED.3IONS-SCNC: 14.2 MMOL/L (ref 5–15)
AST SERPL-CCNC: 26 U/L (ref 1–40)
BASOPHILS # BLD AUTO: 0.07 10*3/MM3 (ref 0–0.2)
BASOPHILS NFR BLD AUTO: 1.3 % (ref 0–1.5)
BILIRUB SERPL-MCNC: 0.3 MG/DL (ref 0–1.2)
BUN SERPL-MCNC: 15 MG/DL (ref 6–20)
BUN/CREAT SERPL: 12.7 (ref 7–25)
C4 SERPL-MCNC: 22 MG/DL (ref 14–44)
CALCIUM SPEC-SCNC: 10.2 MG/DL (ref 8.6–10.5)
CHLORIDE SERPL-SCNC: 103 MMOL/L (ref 98–107)
CO2 SERPL-SCNC: 24.8 MMOL/L (ref 22–29)
CREAT SERPL-MCNC: 1.18 MG/DL (ref 0.76–1.27)
DEPRECATED RDW RBC AUTO: 42.5 FL (ref 37–54)
EGFRCR SERPLBLD CKD-EPI 2021: 79.5 ML/MIN/1.73
EOSINOPHIL # BLD AUTO: 0.34 10*3/MM3 (ref 0–0.4)
EOSINOPHIL NFR BLD AUTO: 6.3 % (ref 0.3–6.2)
ERYTHROCYTE [DISTWIDTH] IN BLOOD BY AUTOMATED COUNT: 12.4 % (ref 12.3–15.4)
GLOBULIN UR ELPH-MCNC: 2.3 GM/DL
GLUCOSE SERPL-MCNC: 66 MG/DL (ref 65–99)
HCT VFR BLD AUTO: 50.3 % (ref 37.5–51)
HGB BLD-MCNC: 16.8 G/DL (ref 13–17.7)
HOLD SPECIMEN: NORMAL
IMM GRANULOCYTES # BLD AUTO: 0.01 10*3/MM3 (ref 0–0.05)
IMM GRANULOCYTES NFR BLD AUTO: 0.2 % (ref 0–0.5)
LYMPHOCYTES # BLD AUTO: 1.3 10*3/MM3 (ref 0.7–3.1)
LYMPHOCYTES NFR BLD AUTO: 24.1 % (ref 19.6–45.3)
MCH RBC QN AUTO: 31.5 PG (ref 26.6–33)
MCHC RBC AUTO-ENTMCNC: 33.4 G/DL (ref 31.5–35.7)
MCV RBC AUTO: 94.2 FL (ref 79–97)
MONOCYTES # BLD AUTO: 0.5 10*3/MM3 (ref 0.1–0.9)
MONOCYTES NFR BLD AUTO: 9.3 % (ref 5–12)
NEUTROPHILS NFR BLD AUTO: 3.18 10*3/MM3 (ref 1.7–7)
NEUTROPHILS NFR BLD AUTO: 58.8 % (ref 42.7–76)
NRBC BLD AUTO-RTO: 0 /100 WBC (ref 0–0.2)
PLATELET # BLD AUTO: 275 10*3/MM3 (ref 140–450)
PMV BLD AUTO: 10.7 FL (ref 6–12)
POTASSIUM SERPL-SCNC: 4.4 MMOL/L (ref 3.5–5.2)
PROT SERPL-MCNC: 7.4 G/DL (ref 6–8.5)
RBC # BLD AUTO: 5.34 10*6/MM3 (ref 4.14–5.8)
SODIUM SERPL-SCNC: 142 MMOL/L (ref 136–145)
T3FREE SERPL-MCNC: 3.38 PG/ML (ref 2–4.4)
T4 FREE SERPL-MCNC: 1.09 NG/DL (ref 0.93–1.7)
TSH SERPL DL<=0.05 MIU/L-ACNC: 1.68 UIU/ML (ref 0.27–4.2)
WBC NRBC COR # BLD AUTO: 5.4 10*3/MM3 (ref 3.4–10.8)

## 2024-04-15 PROCEDURE — 86003 ALLG SPEC IGE CRUDE XTRC EA: CPT

## 2024-04-15 PROCEDURE — 86008 ALLG SPEC IGE RECOMB EA: CPT

## 2024-04-15 PROCEDURE — 83520 IMMUNOASSAY QUANT NOS NONAB: CPT

## 2024-04-15 PROCEDURE — 86160 COMPLEMENT ANTIGEN: CPT

## 2024-04-15 PROCEDURE — 80050 GENERAL HEALTH PANEL: CPT

## 2024-04-15 PROCEDURE — 84439 ASSAY OF FREE THYROXINE: CPT

## 2024-04-15 PROCEDURE — 36415 COLL VENOUS BLD VENIPUNCTURE: CPT

## 2024-04-15 PROCEDURE — 86161 COMPLEMENT/FUNCTION ACTIVITY: CPT

## 2024-04-15 PROCEDURE — 82785 ASSAY OF IGE: CPT

## 2024-04-15 PROCEDURE — 86352 CELL FUNCTION ASSAY W/STIM: CPT

## 2024-04-15 PROCEDURE — 84481 FREE ASSAY (FT-3): CPT

## 2024-04-16 LAB — SPECIMEN STATUS: NORMAL

## 2024-04-17 LAB
C1INH ACT/NOR SERPL: >110 %MEAN NORMAL
C1INH SERPL-MCNC: 35 MG/DL (ref 21–39)

## 2024-04-18 LAB
A-LACTALB IGE QN: 0.16 KU/L
B-LACTOGLOB IGE QN: 0.41 KU/L
CASEIN IGE QN: 0.48 KU/L
CHEDDAR IGE QN: 0.18 KU/L
CHEESE MOLD IGE QN: 0.34 KU/L
CONV CLASS DESCRIPTION: ABNORMAL
COW MILK IGE QN: 3.2 KU/L
FC EPSILON RI + RII AB SER-ACNC: 12.4
TRYPTASE SERPL-MCNC: 7.2 UG/L (ref 2.2–13.2)
WALNUT IGE QN: <0.1 KU/L
WHEAT IGE QN: <0.1 KU/L
WHITE MULBERRY IGE QN: <0.1 KU/L

## 2024-04-19 LAB
ALPHA-GAL IGE QN: 28.4 KU/L
BEEF IGE QN: 15.4 KU/L
CONV CLASS DESCRIPTION: ABNORMAL
IGE SERPL-ACNC: 281 IU/ML (ref 6–495)
LAMB IGE QN: 12.7 KU/L
PORK IGE QN: 11.8 KU/L

## 2024-04-20 LAB
A ALTERNATA IGE QN: <0.1 KU/L
A FUMIGATUS IGE QN: <0.1 KU/L
BERMUDA GRASS IGE QN: <0.1 KU/L
BOXELDER IGE QN: 0.36 KU/L
C HERBARUM IGE QN: <0.1 KU/L
CASEIN IGE QN: 0.37 KU/L
CAT DANDER IGE QN: 2.34 KU/L
COTTONWOOD IGE QN: 0.11 KU/L
D FARINAE IGE QN: 0.1 KU/L
D PTERONYSS IGE QN: 0.1 KU/L
DOG DANDER IGE QN: 2.44 KU/L
GIANT RAGWEED IGE QN: <0.1 KU/L
MOUSE EPITH IGE QN: 3.54 KU/L
MT JUNIPER IGE QN: <0.1 KU/L
ROACH IGE QN: 0.76 KU/L
SILVER BIRCH IGE QN: 0.37 KU/L
WHITE ASH IGE QN: 0.12 KU/L
WHITE ELM IGE QN: 0.19 KU/L
WHITE HICKORY IGE QN: 0.12 KU/L
WHITE OAK IGE QN: 0.78 KU/L

## 2024-06-05 RX ORDER — CYCLOBENZAPRINE HCL 5 MG
5 TABLET ORAL DAILY PRN
Qty: 30 TABLET | Refills: 0 | Status: SHIPPED | OUTPATIENT
Start: 2024-06-05

## 2024-08-09 NOTE — PROGRESS NOTES
Subjective   Warren Giraldo is a 43 y.o. male.   Chief Complaint   Patient presents with    Annual Exam       History of Present Illness  The patient is here: for coordination of medical care.  Patient has: moderate activity with work/home activities, exercises 2 - 3 times per week, good appetite, feels well with minor complaints, and is sleeping well    TDAP/TD VACCINES(1 - Tdap) due on 12/18/2019  COVID-19 Vaccine(4 - 2023-24 season) due on 09/01/2023  ANNUAL PHYSICAL due on 01/16/2024  INFLUENZA VACCINE due on 08/01/2024  BMI FOLLOWUP due on 10/09/2024  HEPATITIS C SCREENING Completed  Pneumococcal Vaccine 0-64 Aged Out  Current pain scale 0/10.        The following portions of the patient's history were reviewed and updated as appropriate: allergies, current medications, past family history, past medical history, past social history, past surgical history, and problem list.    Patient Active Problem List   Diagnosis    Acne varioliformis    Acute stress disorder    Allergic rhinitis    Anxiety    Classical migraine    Costochondritis    Gastroesophageal reflux disease with esophagitis    PUD (peptic ulcer disease)    Anaphylaxis due to food    Allergic reaction to alpha-gal    Bipolar affective disorder, depressed    Shortness of breath    Annual physical exam    BMI 25.0-25.9,adult       Current Outpatient Medications on File Prior to Visit   Medication Sig Dispense Refill    busPIRone (BUSPAR) 15 MG tablet       cetirizine (zyrTEC) 10 MG tablet Take 1 tablet by mouth Daily.      cholecalciferol (VITAMIN D3) 25 MCG (1000 UT) tablet Take 1 tablet by mouth Daily. vegan      cyclobenzaprine (FLEXERIL) 5 MG tablet TAKE 1 TABLET BY MOUTH DAILY AS NEEDED FOR MUSCLE SPASMS. 30 tablet 0    lamoTRIgine (LaMICtal) 150 MG tablet       levalbuterol (XOPENEX HFA) 45 MCG/ACT inhaler Inhale 1-2 puffs Every 4 (Four) Hours As Needed for Wheezing. 1 each 0    LORazepam (ATIVAN) 1 MG tablet Take 1 tablet by mouth 2 (Two) Times  a Day As Needed for Anxiety.      multivitamin with minerals tablet tablet Take 1 tablet by mouth Daily.      QUEtiapine (SEROquel) 100 MG tablet Take 2 tablets by mouth Every Morning.      QUEtiapine (SEROquel) 400 MG tablet Take 1 tablet by mouth Every Night.      venlafaxine 225 MG tablet sustained-release 24 hour 24 hr tablet Take 1 tablet by mouth Every Morning.      [DISCONTINUED] famotidine (PEPCID) 40 MG tablet Take 1 tablet by mouth 2 (two) times a day.      [DISCONTINUED] tadalafil (Cialis) 5 MG tablet Take 1 tablet by mouth Daily As Needed for Erectile Dysfunction. 30 tablet 5    methylPREDNISolone (MEDROL) 4 MG dose pack Take as directed on package instructions. (Patient not taking: Reported on 8/12/2024) 21 tablet 0    [DISCONTINUED] albuterol sulfate  (90 Base) MCG/ACT inhaler Inhale 2 puffs Every 6 (Six) Hours As Needed for Shortness of Air or Wheezing. (Patient not taking: Reported on 8/12/2024) 6.7 g 0    [DISCONTINUED] diclofenac (VOLTAREN) 75 MG EC tablet Take 1 tablet by mouth 2 (Two) Times a Day. (Patient not taking: Reported on 8/12/2024) 60 tablet 0     No current facility-administered medications on file prior to visit.     Current outpatient and discharge medications have been reconciled for the patient.  Reviewed by: Baldev Sandy MD      Allergies   Allergen Reactions    Bee Venom Anaphylaxis    Beef-Derived Products Anaphylaxis     Alpha-gal    Dairy Aid [Tilactase] Anaphylaxis    Latex Anaphylaxis    Meat Extract Anaphylaxis    Milk Protein Anaphylaxis     Alpha-Gal    Other Anaphylaxis     Has Alpha gal so any mammal products    Pork-Derived Products Anaphylaxis     Alpha-gal      Zinc Gelatin [Zinc] Anaphylaxis       Review of Systems   Constitutional:  Negative for activity change, appetite change, fatigue and fever.   HENT:  Negative for ear pain, swollen glands and voice change.    Eyes:  Negative for visual disturbance.   Respiratory:  Negative for shortness of  "breath and wheezing.    Cardiovascular:  Negative for chest pain and leg swelling.   Gastrointestinal:  Negative for abdominal pain, blood in stool, constipation, diarrhea, nausea and vomiting.   Endocrine: Negative for polydipsia and polyuria.   Genitourinary:  Negative for dysuria, frequency and hematuria.   Musculoskeletal:  Negative for joint swelling, neck pain and neck stiffness.   Skin:  Negative for rash and wound.   Neurological:  Negative for weakness, numbness and headache.   Psychiatric/Behavioral:  Negative for suicidal ideas and depressed mood.      I have reviewed and confirmed the accuracy of the ROS as documented by the MA/LPN/RN Baldev Sandy MD    Objective   Visit Vitals  /78 (BP Location: Right arm, Patient Position: Sitting, Cuff Size: Adult)   Pulse 104   Temp 97.7 °F (36.5 °C) (Temporal)   Resp 18   Ht 167.6 cm (66\")   Wt 78.7 kg (173 lb 6.4 oz)   SpO2 100%   BMI 27.99 kg/m²      **  Physical Exam  Constitutional:       Appearance: He is well-developed.   HENT:      Head: Normocephalic and atraumatic.      Right Ear: External ear normal.      Left Ear: External ear normal.      Nose: Nose normal.   Eyes:      Pupils: Pupils are equal, round, and reactive to light.   Cardiovascular:      Rate and Rhythm: Normal rate and regular rhythm.      Heart sounds: Normal heart sounds.   Pulmonary:      Effort: Pulmonary effort is normal.      Breath sounds: Normal breath sounds.   Abdominal:      General: Bowel sounds are normal.      Palpations: Abdomen is soft.   Musculoskeletal:         General: Normal range of motion.      Cervical back: Normal range of motion and neck supple.   Skin:     General: Skin is warm and dry.   Neurological:      Mental Status: He is alert and oriented to person, place, and time.   Psychiatric:         Behavior: Behavior normal.         Thought Content: Thought content normal.         Judgment: Judgment normal.       Derm Physical Exam  Ortho Exam "   Neurologic Exam     Mental Status   Oriented to person, place, and time.     Cranial Nerves     CN III, IV, VI   Pupils are equal, round, and reactive to light.         Diagnoses and all orders for this visit:    1. Encounter for wellness examination (Primary)  -     POCT urinalysis dipstick, manual  -     CBC & Differential  -     Comprehensive Metabolic Panel  -     Lipid Panel With / Chol / HDL Ratio  -     TSH    2. Erectile dysfunction, unspecified erectile dysfunction type  -     tadalafil (Cialis) 5 MG tablet; Take 1 tablet by mouth Daily As Needed for Erectile Dysfunction.  Dispense: 30 tablet; Refill: 11     Discussed anticipatory guidance, diet, exercise, and weight loss.  Discussed safety and routine screening examinations.  Discussed self-examinations.  Warren Giraldo  reports that he has never smoked. He has never used smokeless tobacco.   Follow-up for routine health maintenance as indicated.    Expected course, medications, and adverse effects discussed as appropriate.  Call or return if worsening or persistent symptoms.     This document is intended for medical professional use only.   Electronically signed by Baldev Sandy MD on 08/12/2024. This content may not have been proofread.

## 2024-08-12 ENCOUNTER — OFFICE VISIT (OUTPATIENT)
Dept: FAMILY MEDICINE CLINIC | Facility: CLINIC | Age: 44
End: 2024-08-12
Payer: COMMERCIAL

## 2024-08-12 VITALS
BODY MASS INDEX: 27.87 KG/M2 | SYSTOLIC BLOOD PRESSURE: 122 MMHG | HEIGHT: 66 IN | TEMPERATURE: 97.7 F | DIASTOLIC BLOOD PRESSURE: 78 MMHG | HEART RATE: 104 BPM | OXYGEN SATURATION: 100 % | WEIGHT: 173.4 LBS | RESPIRATION RATE: 18 BRPM

## 2024-08-12 DIAGNOSIS — N52.9 ERECTILE DYSFUNCTION, UNSPECIFIED ERECTILE DYSFUNCTION TYPE: ICD-10-CM

## 2024-08-12 DIAGNOSIS — Z00.00 ENCOUNTER FOR WELLNESS EXAMINATION: Primary | ICD-10-CM

## 2024-08-12 LAB
BILIRUB BLD-MCNC: NEGATIVE MG/DL
CLARITY, POC: CLEAR
COLOR UR: YELLOW
GLUCOSE UR STRIP-MCNC: NEGATIVE MG/DL
KETONES UR QL: NEGATIVE
LEUKOCYTE EST, POC: NEGATIVE
NITRITE UR-MCNC: NEGATIVE MG/ML
PH UR: 5 [PH] (ref 5–8)
PROT UR STRIP-MCNC: NEGATIVE MG/DL
RBC # UR STRIP: NEGATIVE /UL
SP GR UR: 1 (ref 1–1.03)
UROBILINOGEN UR QL: NORMAL

## 2024-08-12 PROCEDURE — 81002 URINALYSIS NONAUTO W/O SCOPE: CPT | Performed by: FAMILY MEDICINE

## 2024-08-12 PROCEDURE — 99396 PREV VISIT EST AGE 40-64: CPT | Performed by: FAMILY MEDICINE

## 2024-08-12 RX ORDER — TADALAFIL 5 MG/1
5 TABLET ORAL DAILY PRN
Qty: 30 TABLET | Refills: 11 | Status: SHIPPED | OUTPATIENT
Start: 2024-08-12

## 2024-08-12 RX ORDER — CYCLOBENZAPRINE HCL 5 MG
5 TABLET ORAL DAILY PRN
Qty: 30 TABLET | Refills: 0 | OUTPATIENT
Start: 2024-08-12

## 2024-08-13 LAB
ALBUMIN SERPL-MCNC: 4.5 G/DL (ref 4.1–5.1)
ALP SERPL-CCNC: 51 IU/L (ref 44–121)
ALT SERPL-CCNC: 18 IU/L (ref 0–44)
AST SERPL-CCNC: 16 IU/L (ref 0–40)
BASOPHILS # BLD AUTO: 0 X10E3/UL (ref 0–0.2)
BASOPHILS NFR BLD AUTO: 1 %
BILIRUB SERPL-MCNC: <0.2 MG/DL (ref 0–1.2)
BUN SERPL-MCNC: 14 MG/DL (ref 6–24)
BUN/CREAT SERPL: 12 (ref 9–20)
CALCIUM SERPL-MCNC: 9.7 MG/DL (ref 8.7–10.2)
CHLORIDE SERPL-SCNC: 106 MMOL/L (ref 96–106)
CHOLEST SERPL-MCNC: 183 MG/DL (ref 100–199)
CHOLEST/HDLC SERPL: 4.5 RATIO (ref 0–5)
CO2 SERPL-SCNC: 24 MMOL/L (ref 20–29)
CREAT SERPL-MCNC: 1.16 MG/DL (ref 0.76–1.27)
EGFRCR SERPLBLD CKD-EPI 2021: 81 ML/MIN/1.73
EOSINOPHIL # BLD AUTO: 0.3 X10E3/UL (ref 0–0.4)
EOSINOPHIL NFR BLD AUTO: 6 %
ERYTHROCYTE [DISTWIDTH] IN BLOOD BY AUTOMATED COUNT: 12.8 % (ref 11.6–15.4)
GLOBULIN SER CALC-MCNC: 1.7 G/DL (ref 1.5–4.5)
GLUCOSE SERPL-MCNC: 82 MG/DL (ref 70–99)
HCT VFR BLD AUTO: 47.3 % (ref 37.5–51)
HDLC SERPL-MCNC: 41 MG/DL
HGB BLD-MCNC: 15.6 G/DL (ref 13–17.7)
IMM GRANULOCYTES # BLD AUTO: 0 X10E3/UL (ref 0–0.1)
IMM GRANULOCYTES NFR BLD AUTO: 0 %
LDLC SERPL CALC-MCNC: 96 MG/DL (ref 0–99)
LYMPHOCYTES # BLD AUTO: 1 X10E3/UL (ref 0.7–3.1)
LYMPHOCYTES NFR BLD AUTO: 22 %
MCH RBC QN AUTO: 32.5 PG (ref 26.6–33)
MCHC RBC AUTO-ENTMCNC: 33 G/DL (ref 31.5–35.7)
MCV RBC AUTO: 99 FL (ref 79–97)
MONOCYTES # BLD AUTO: 0.6 X10E3/UL (ref 0.1–0.9)
MONOCYTES NFR BLD AUTO: 13 %
NEUTROPHILS # BLD AUTO: 2.7 X10E3/UL (ref 1.4–7)
NEUTROPHILS NFR BLD AUTO: 58 %
PLATELET # BLD AUTO: 230 X10E3/UL (ref 150–450)
POTASSIUM SERPL-SCNC: 4.7 MMOL/L (ref 3.5–5.2)
PROT SERPL-MCNC: 6.2 G/DL (ref 6–8.5)
RBC # BLD AUTO: 4.8 X10E6/UL (ref 4.14–5.8)
SODIUM SERPL-SCNC: 141 MMOL/L (ref 134–144)
TRIGL SERPL-MCNC: 272 MG/DL (ref 0–149)
TSH SERPL DL<=0.005 MIU/L-ACNC: 0.93 UIU/ML (ref 0.45–4.5)
VLDLC SERPL CALC-MCNC: 46 MG/DL (ref 5–40)
WBC # BLD AUTO: 4.6 X10E3/UL (ref 3.4–10.8)

## 2024-08-14 DIAGNOSIS — N52.9 ERECTILE DYSFUNCTION, UNSPECIFIED ERECTILE DYSFUNCTION TYPE: ICD-10-CM

## 2024-08-14 RX ORDER — TADALAFIL 5 MG/1
5 TABLET ORAL DAILY PRN
Qty: 30 TABLET | Refills: 5 | OUTPATIENT
Start: 2024-08-14

## 2024-09-16 ENCOUNTER — PATIENT MESSAGE (OUTPATIENT)
Dept: FAMILY MEDICINE CLINIC | Facility: CLINIC | Age: 44
End: 2024-09-16
Payer: COMMERCIAL

## 2024-09-25 ENCOUNTER — HOSPITAL ENCOUNTER (EMERGENCY)
Facility: HOSPITAL | Age: 44
Discharge: HOME | End: 2024-09-25
Attending: EMERGENCY MEDICINE
Payer: COMMERCIAL

## 2024-09-25 VITALS
TEMPERATURE: 98.4 F | BODY MASS INDEX: 22.35 KG/M2 | OXYGEN SATURATION: 98 % | WEIGHT: 165 LBS | HEART RATE: 108 BPM | DIASTOLIC BLOOD PRESSURE: 87 MMHG | HEIGHT: 72 IN | SYSTOLIC BLOOD PRESSURE: 122 MMHG | RESPIRATION RATE: 17 BRPM

## 2024-09-25 DIAGNOSIS — T78.40XA ALLERGIC REACTION, INITIAL ENCOUNTER: Primary | ICD-10-CM

## 2024-09-25 LAB
HOLD SPECIMEN: NORMAL
HOLD SPECIMEN: NORMAL
WHOLE BLOOD HOLD COAG: NORMAL
WHOLE BLOOD HOLD SPECIMEN: NORMAL

## 2024-09-25 PROCEDURE — 25010000002 DIPHENHYDRAMINE PER 50 MG: Performed by: EMERGENCY MEDICINE

## 2024-09-25 PROCEDURE — 99283 EMERGENCY DEPT VISIT LOW MDM: CPT

## 2024-09-25 PROCEDURE — 96374 THER/PROPH/DIAG INJ IV PUSH: CPT

## 2024-09-25 PROCEDURE — 25010000002 DIPHENHYDRAMINE PER 50 MG: Performed by: NURSE PRACTITIONER

## 2024-09-25 PROCEDURE — 25010000002 METHYLPREDNISOLONE PER 125 MG: Performed by: NURSE PRACTITIONER

## 2024-09-25 PROCEDURE — 96375 TX/PRO/DX INJ NEW DRUG ADDON: CPT

## 2024-09-25 RX ORDER — METHYLPREDNISOLONE SODIUM SUCCINATE 125 MG/2ML
125 INJECTION, POWDER, LYOPHILIZED, FOR SOLUTION INTRAMUSCULAR; INTRAVENOUS ONCE
Status: COMPLETED | OUTPATIENT
Start: 2024-09-25 | End: 2024-09-25

## 2024-09-25 RX ORDER — HYDROXYZINE HYDROCHLORIDE 25 MG/1
25 TABLET, FILM COATED ORAL 3 TIMES DAILY PRN
Qty: 30 TABLET | Refills: 0 | Status: SHIPPED | OUTPATIENT
Start: 2024-09-25

## 2024-09-25 RX ORDER — FAMOTIDINE 10 MG/ML
20 INJECTION, SOLUTION INTRAVENOUS ONCE
Status: COMPLETED | OUTPATIENT
Start: 2024-09-25 | End: 2024-09-25

## 2024-09-25 RX ORDER — DIPHENHYDRAMINE HYDROCHLORIDE 50 MG/ML
25 INJECTION INTRAMUSCULAR; INTRAVENOUS ONCE
Status: COMPLETED | OUTPATIENT
Start: 2024-09-25 | End: 2024-09-25

## 2024-09-25 RX ORDER — SODIUM CHLORIDE 0.9 % (FLUSH) 0.9 %
10 SYRINGE (ML) INJECTION AS NEEDED
Status: DISCONTINUED | OUTPATIENT
Start: 2024-09-25 | End: 2024-09-25 | Stop reason: HOSPADM

## 2024-09-25 RX ORDER — EPINEPHRINE 0.3 MG/.3ML
0.3 INJECTION SUBCUTANEOUS ONCE
Qty: 1 EACH | Refills: 0 | Status: SHIPPED | OUTPATIENT
Start: 2024-09-25 | End: 2024-09-25

## 2024-09-25 RX ORDER — PREDNISONE 20 MG/1
20 TABLET ORAL DAILY
Qty: 5 TABLET | Refills: 0 | Status: SHIPPED | OUTPATIENT
Start: 2024-09-25

## 2024-09-25 RX ADMIN — METHYLPREDNISOLONE SODIUM SUCCINATE 125 MG: 125 INJECTION, POWDER, FOR SOLUTION INTRAMUSCULAR; INTRAVENOUS at 16:14

## 2024-09-25 RX ADMIN — DIPHENHYDRAMINE HYDROCHLORIDE 25 MG: 50 INJECTION, SOLUTION INTRAMUSCULAR; INTRAVENOUS at 16:10

## 2024-09-25 RX ADMIN — FAMOTIDINE 20 MG: 10 INJECTION INTRAVENOUS at 16:12

## 2024-09-25 RX ADMIN — DIPHENHYDRAMINE HYDROCHLORIDE 25 MG: 50 INJECTION, SOLUTION INTRAMUSCULAR; INTRAVENOUS at 16:23

## 2024-09-25 NOTE — ED PROVIDER NOTES
"Subjective   Provider in Triage Note  Took an epi pen today at 1500. Dx with alpha gal x 2 years ago. C/o \"throat closing\" sensation. No v/d. Not sure what he was exposed to.    Due to significant overcrowding in the emergency department patient was initially seen and evaluated in triage.  Provider in triage recommended patient placement in the treatment area to initiate therapy and movement to an ER bed as soon as possible.   Orders placed; medications will be deferred to main provider per protocol.        History of Present Illness  I interviewed the patient for HPI and agree with the nurse practitioner providing triage note as noted above  Review of Systems    Past Medical History:   Diagnosis Date    Acne varioliformis     Allergic     Allergic rhinitis     Anxiety     Costochondritis 03/04/2013    Depression     Gastroesophageal reflux disease with esophagitis     History of kidney stones     Migraine with aura     PUD (peptic ulcer disease)        Allergies   Allergen Reactions    Bee Venom Anaphylaxis    Beef-Derived Products Anaphylaxis     Alpha-gal    Dairy Aid [Tilactase] Anaphylaxis    Latex Anaphylaxis    Meat Extract Anaphylaxis    Milk Protein Anaphylaxis     Alpha-Gal    Other Anaphylaxis     Has Alpha gal so any mammal products    Pork-Derived Products Anaphylaxis     Alpha-gal      Zinc Gelatin [Zinc] Anaphylaxis       Past Surgical History:   Procedure Laterality Date    APPENDECTOMY      CHOLECYSTECTOMY      HYDROCELE EXCISION / REPAIR Left        Family History   Problem Relation Age of Onset    Diabetes Father         passed away due to complications    Coronary artery disease Father     Depression Mother     Diabetes Mother     Mental illness Neg Hx     Alcohol abuse Neg Hx     Drug abuse Neg Hx        Social History     Socioeconomic History    Marital status:    Tobacco Use    Smoking status: Never    Smokeless tobacco: Never   Vaping Use    Vaping status: Never Used   Substance and " Sexual Activity    Alcohol use: Yes     Alcohol/week: 4.0 standard drinks of alcohol     Types: 4 Cans of beer per week    Drug use: Never    Sexual activity: Yes     Partners: Female           Objective   Physical Exam  Skin exam shows patient have hives over his trunk and neck.  Neck has no stridor.  Lungs are clear.  Heart has regular rhythm.  Abdomen soft nontender.  Procedures           ED Course      Results for orders placed or performed during the hospital encounter of 09/25/24   Green Top (Gel)   Result Value Ref Range    Extra Tube Hold for add-ons.    Lavender Top   Result Value Ref Range    Extra Tube hold for add-on    Gold Top - SST   Result Value Ref Range    Extra Tube Hold for add-ons.    Light Blue Top   Result Value Ref Range    Extra Tube Hold for add-ons.                                             Medical Decision Making  Patient did use an EpiPen on scene when he started having symptoms.  In the ED he received 50 mg of Benadryl IV as well as some Medrol IV.  He was observed for a total of 2 hours from start of his symptoms and his at baseline with normal vital signs and no progression of his symptoms.  Will be discharged with a prescription for prednisone and Atarax.  Will see his MD for recheck.    Risk  Prescription drug management.        Final diagnoses:   Allergic reaction, initial encounter       ED Disposition  ED Disposition       ED Disposition   Discharge    Condition   Stable    Comment   --               No follow-up provider specified.       Medication List        New Prescriptions      EPINEPHrine 0.3 MG/0.3ML solution auto-injector injection  Commonly known as: EPIPEN  Inject 0.3 mL under the skin into the appropriate area as directed 1 (One) Time for 1 dose.     hydrOXYzine 25 MG tablet  Commonly known as: ATARAX  Take 1 tablet by mouth 3 (Three) Times a Day As Needed for Itching.     predniSONE 20 MG tablet  Commonly known as: DELTASONE  Take 1 tablet by mouth Daily.                Where to Get Your Medications        These medications were sent to [facility]/pharmacy #[id number] - [place], [place] - [address] [facility] [facility] - [phone]  - [phone]   [address] [facility] [place] [address]      Phone: [phone]   EPINEPHrine 0.3 MG/0.3ML solution auto-injector injection  hydrOXYzine 25 MG tablet  predniSONE 20 MG tablet            Krish Garrett MD  09/25/24 0059

## 2024-09-26 RX ORDER — CYCLOBENZAPRINE HCL 5 MG
5 TABLET ORAL DAILY PRN
Qty: 30 TABLET | Refills: 0 | OUTPATIENT
Start: 2024-09-26

## 2025-05-01 ENCOUNTER — PATIENT MESSAGE (OUTPATIENT)
Dept: FAMILY MEDICINE CLINIC | Facility: CLINIC | Age: 45
End: 2025-05-01
Payer: COMMERCIAL

## 2025-06-10 ENCOUNTER — PATIENT MESSAGE (OUTPATIENT)
Dept: FAMILY MEDICINE CLINIC | Facility: CLINIC | Age: 45
End: 2025-06-10
Payer: COMMERCIAL

## 2025-06-11 RX ORDER — EPINEPHRINE 0.3 MG/.3ML
0.3 INJECTION SUBCUTANEOUS ONCE
Qty: 1 EACH | Refills: 0 | Status: SHIPPED | OUTPATIENT
Start: 2025-06-11 | End: 2025-06-11

## 2025-07-10 ENCOUNTER — HOSPITAL ENCOUNTER (OUTPATIENT)
Facility: HOSPITAL | Age: 45
Setting detail: OBSERVATION
Discharge: HOME | End: 2025-07-11
Attending: HOSPITALIST | Admitting: HOSPITALIST
Payer: COMMERCIAL

## 2025-07-10 DIAGNOSIS — L29.9 PRURITUS: ICD-10-CM

## 2025-07-10 DIAGNOSIS — T78.40XA ALLERGIC REACTION, INITIAL ENCOUNTER: Primary | ICD-10-CM

## 2025-07-10 LAB
ALBUMIN SERPL-MCNC: 3.6 G/DL (ref 3.5–5.2)
ALBUMIN/GLOB SERPL: 2.4 G/DL
ALP SERPL-CCNC: 48 U/L (ref 39–117)
ALT SERPL W P-5'-P-CCNC: 40 U/L (ref 1–41)
ANION GAP SERPL CALCULATED.3IONS-SCNC: 9.5 MMOL/L (ref 5–15)
AST SERPL-CCNC: 70 U/L (ref 1–40)
BILIRUB SERPL-MCNC: 0.5 MG/DL (ref 0–1.2)
BUN SERPL-MCNC: 23.5 MG/DL (ref 6–20)
BUN/CREAT SERPL: 15.9 (ref 7–25)
CALCIUM SPEC-SCNC: 8 MG/DL (ref 8.6–10.5)
CHLORIDE SERPL-SCNC: 109 MMOL/L (ref 98–107)
CO2 SERPL-SCNC: 18.5 MMOL/L (ref 22–29)
CREAT SERPL-MCNC: 1.48 MG/DL (ref 0.76–1.27)
DEPRECATED RDW RBC AUTO: 40.3 FL (ref 37–54)
EGFRCR SERPLBLD CKD-EPI 2021: 60.2 ML/MIN/1.73
EOSINOPHIL # BLD MANUAL: 0.25 10*3/MM3 (ref 0–0.4)
EOSINOPHIL NFR BLD MANUAL: 3 % (ref 0.3–6.2)
ERYTHROCYTE [DISTWIDTH] IN BLOOD BY AUTOMATED COUNT: 11.7 % (ref 12.3–15.4)
FLUAV RNA RESP QL NAA+PROBE: NOT DETECTED
FLUBV RNA NPH QL NAA+NON-PROBE: NOT DETECTED
GLOBULIN UR ELPH-MCNC: 1.5 GM/DL
GLUCOSE SERPL-MCNC: 103 MG/DL (ref 65–99)
HCT VFR BLD AUTO: 43.3 % (ref 37.5–51)
HGB BLD-MCNC: 14.4 G/DL (ref 13–17.7)
LYMPHOCYTES # BLD MANUAL: 0.17 10*3/MM3 (ref 0.7–3.1)
LYMPHOCYTES NFR BLD MANUAL: 3 % (ref 5–12)
MAGNESIUM SERPL-MCNC: 1.6 MG/DL (ref 1.6–2.6)
MCH RBC QN AUTO: 31.6 PG (ref 26.6–33)
MCHC RBC AUTO-ENTMCNC: 33.3 G/DL (ref 31.5–35.7)
MCV RBC AUTO: 95.2 FL (ref 79–97)
METAMYELOCYTES NFR BLD MANUAL: 2 % (ref 0–0)
MONOCYTES # BLD: 0.25 10*3/MM3 (ref 0.1–0.9)
NEUTROPHILS # BLD AUTO: 7.54 10*3/MM3 (ref 1.7–7)
NEUTROPHILS NFR BLD MANUAL: 52 % (ref 42.7–76)
NEUTS BAND NFR BLD MANUAL: 38 % (ref 0–5)
PLAT MORPH BLD: NORMAL
PLATELET # BLD AUTO: 188 10*3/MM3 (ref 140–450)
PMV BLD AUTO: 10.3 FL (ref 6–12)
POTASSIUM SERPL-SCNC: 4.2 MMOL/L (ref 3.5–5.2)
PROT SERPL-MCNC: 5.1 G/DL (ref 6–8.5)
QT INTERVAL: 317 MS
QTC INTERVAL: 440 MS
RBC # BLD AUTO: 4.55 10*6/MM3 (ref 4.14–5.8)
RBC MORPH BLD: NORMAL
RSV RNA RESP QL NAA+PROBE: NOT DETECTED
SARS-COV-2 RNA RESP QL NAA+PROBE: NOT DETECTED
SCAN SLIDE: NORMAL
SODIUM SERPL-SCNC: 137 MMOL/L (ref 136–145)
VARIANT LYMPHS NFR BLD MANUAL: 2 % (ref 19.6–45.3)
WBC MORPH BLD: NORMAL
WBC NRBC COR # BLD AUTO: 8.38 10*3/MM3 (ref 3.4–10.8)

## 2025-07-10 PROCEDURE — 85007 BL SMEAR W/DIFF WBC COUNT: CPT

## 2025-07-10 PROCEDURE — 25810000003 SODIUM CHLORIDE 0.9 % SOLUTION: Performed by: NURSE PRACTITIONER

## 2025-07-10 PROCEDURE — 93005 ELECTROCARDIOGRAM TRACING: CPT | Performed by: HOSPITALIST

## 2025-07-10 PROCEDURE — 36415 COLL VENOUS BLD VENIPUNCTURE: CPT

## 2025-07-10 PROCEDURE — G0378 HOSPITAL OBSERVATION PER HR: HCPCS

## 2025-07-10 PROCEDURE — 96361 HYDRATE IV INFUSION ADD-ON: CPT

## 2025-07-10 PROCEDURE — 80053 COMPREHEN METABOLIC PANEL: CPT

## 2025-07-10 PROCEDURE — 25810000003 SODIUM CHLORIDE 0.9 % SOLUTION

## 2025-07-10 PROCEDURE — 93005 ELECTROCARDIOGRAM TRACING: CPT

## 2025-07-10 PROCEDURE — 96375 TX/PRO/DX INJ NEW DRUG ADDON: CPT

## 2025-07-10 PROCEDURE — 25010000002 METHYLPREDNISOLONE PER 125 MG: Performed by: NURSE PRACTITIONER

## 2025-07-10 PROCEDURE — 25010000002 DEXAMETHASONE PER 1 MG: Performed by: HOSPITALIST

## 2025-07-10 PROCEDURE — 87637 SARSCOV2&INF A&B&RSV AMP PRB: CPT | Performed by: HOSPITALIST

## 2025-07-10 PROCEDURE — 85025 COMPLETE CBC W/AUTO DIFF WBC: CPT

## 2025-07-10 PROCEDURE — 96376 TX/PRO/DX INJ SAME DRUG ADON: CPT

## 2025-07-10 PROCEDURE — 99285 EMERGENCY DEPT VISIT HI MDM: CPT

## 2025-07-10 PROCEDURE — 25010000002 FAMOTIDINE 10 MG/ML SOLUTION: Performed by: NURSE PRACTITIONER

## 2025-07-10 PROCEDURE — 96374 THER/PROPH/DIAG INJ IV PUSH: CPT

## 2025-07-10 PROCEDURE — 25010000002 DIPHENHYDRAMINE PER 50 MG: Performed by: NURSE PRACTITIONER

## 2025-07-10 PROCEDURE — 83735 ASSAY OF MAGNESIUM: CPT

## 2025-07-10 RX ORDER — VENLAFAXINE HYDROCHLORIDE 75 MG/1
150 CAPSULE, EXTENDED RELEASE ORAL
Status: DISCONTINUED | OUTPATIENT
Start: 2025-07-11 | End: 2025-07-11 | Stop reason: HOSPADM

## 2025-07-10 RX ORDER — DIPHENHYDRAMINE HCL 25 MG
25 CAPSULE ORAL EVERY 6 HOURS PRN
Status: DISCONTINUED | OUTPATIENT
Start: 2025-07-10 | End: 2025-07-10

## 2025-07-10 RX ORDER — DEXAMETHASONE SODIUM PHOSPHATE 4 MG/ML
4 INJECTION, SOLUTION INTRA-ARTICULAR; INTRALESIONAL; INTRAMUSCULAR; INTRAVENOUS; SOFT TISSUE EVERY 6 HOURS
Status: DISCONTINUED | OUTPATIENT
Start: 2025-07-10 | End: 2025-07-11 | Stop reason: HOSPADM

## 2025-07-10 RX ORDER — LORAZEPAM 1 MG/1
1 TABLET ORAL EVERY 8 HOURS PRN
Status: DISCONTINUED | OUTPATIENT
Start: 2025-07-10 | End: 2025-07-11 | Stop reason: HOSPADM

## 2025-07-10 RX ORDER — METHYLPREDNISOLONE SODIUM SUCCINATE 125 MG/2ML
125 INJECTION, POWDER, LYOPHILIZED, FOR SOLUTION INTRAMUSCULAR; INTRAVENOUS ONCE
Status: COMPLETED | OUTPATIENT
Start: 2025-07-10 | End: 2025-07-10

## 2025-07-10 RX ORDER — EPINEPHRINE 0.3 MG/.3ML
0.3 INJECTION SUBCUTANEOUS ONCE
Status: ON HOLD | COMMUNITY
Start: 2025-06-11 | End: 2025-07-11

## 2025-07-10 RX ORDER — QUETIAPINE FUMARATE 100 MG/1
400 TABLET, FILM COATED ORAL NIGHTLY
Status: DISCONTINUED | OUTPATIENT
Start: 2025-07-10 | End: 2025-07-11 | Stop reason: HOSPADM

## 2025-07-10 RX ORDER — ACETAMINOPHEN 650 MG/1
650 SUPPOSITORY RECTAL EVERY 4 HOURS PRN
Status: DISCONTINUED | OUTPATIENT
Start: 2025-07-10 | End: 2025-07-11 | Stop reason: HOSPADM

## 2025-07-10 RX ORDER — DIPHENHYDRAMINE HYDROCHLORIDE 50 MG/ML
25 INJECTION, SOLUTION INTRAMUSCULAR; INTRAVENOUS ONCE
Status: COMPLETED | OUTPATIENT
Start: 2025-07-10 | End: 2025-07-10

## 2025-07-10 RX ORDER — ONDANSETRON 2 MG/ML
4 INJECTION INTRAMUSCULAR; INTRAVENOUS EVERY 6 HOURS PRN
Status: DISCONTINUED | OUTPATIENT
Start: 2025-07-10 | End: 2025-07-10

## 2025-07-10 RX ORDER — DIPHENHYDRAMINE HCL 25 MG
25 CAPSULE ORAL EVERY 6 HOURS PRN
Status: DISCONTINUED | OUTPATIENT
Start: 2025-07-10 | End: 2025-07-11 | Stop reason: HOSPADM

## 2025-07-10 RX ORDER — SODIUM CHLORIDE 9 MG/ML
40 INJECTION, SOLUTION INTRAVENOUS AS NEEDED
Status: DISCONTINUED | OUTPATIENT
Start: 2025-07-10 | End: 2025-07-11 | Stop reason: HOSPADM

## 2025-07-10 RX ORDER — CETIRIZINE HYDROCHLORIDE 10 MG/1
10 TABLET ORAL DAILY
Status: DISCONTINUED | OUTPATIENT
Start: 2025-07-10 | End: 2025-07-11 | Stop reason: HOSPADM

## 2025-07-10 RX ORDER — SODIUM CHLORIDE 0.9 % (FLUSH) 0.9 %
10 SYRINGE (ML) INJECTION EVERY 12 HOURS SCHEDULED
Status: DISCONTINUED | OUTPATIENT
Start: 2025-07-10 | End: 2025-07-11 | Stop reason: HOSPADM

## 2025-07-10 RX ORDER — SODIUM CHLORIDE 0.9 % (FLUSH) 0.9 %
10 SYRINGE (ML) INJECTION AS NEEDED
Status: DISCONTINUED | OUTPATIENT
Start: 2025-07-10 | End: 2025-07-11 | Stop reason: HOSPADM

## 2025-07-10 RX ORDER — ONDANSETRON 4 MG/1
4 TABLET, ORALLY DISINTEGRATING ORAL EVERY 6 HOURS PRN
Status: DISCONTINUED | OUTPATIENT
Start: 2025-07-10 | End: 2025-07-10

## 2025-07-10 RX ORDER — QUETIAPINE FUMARATE 100 MG/1
200 TABLET, FILM COATED ORAL DAILY
Status: DISCONTINUED | OUTPATIENT
Start: 2025-07-11 | End: 2025-07-11 | Stop reason: HOSPADM

## 2025-07-10 RX ORDER — FAMOTIDINE 10 MG/ML
20 INJECTION, SOLUTION INTRAVENOUS ONCE
Status: COMPLETED | OUTPATIENT
Start: 2025-07-10 | End: 2025-07-10

## 2025-07-10 RX ORDER — ACETAMINOPHEN 160 MG/5ML
650 SOLUTION ORAL EVERY 4 HOURS PRN
Status: DISCONTINUED | OUTPATIENT
Start: 2025-07-10 | End: 2025-07-11 | Stop reason: HOSPADM

## 2025-07-10 RX ORDER — LAMOTRIGINE 100 MG/1
300 TABLET ORAL DAILY
Status: DISCONTINUED | OUTPATIENT
Start: 2025-07-11 | End: 2025-07-11 | Stop reason: HOSPADM

## 2025-07-10 RX ORDER — SODIUM CHLORIDE 9 MG/ML
125 INJECTION, SOLUTION INTRAVENOUS CONTINUOUS
Status: DISPENSED | OUTPATIENT
Start: 2025-07-10 | End: 2025-07-11

## 2025-07-10 RX ORDER — ACETAMINOPHEN 325 MG/1
650 TABLET ORAL EVERY 4 HOURS PRN
Status: DISCONTINUED | OUTPATIENT
Start: 2025-07-10 | End: 2025-07-11 | Stop reason: HOSPADM

## 2025-07-10 RX ORDER — FAMOTIDINE 10 MG/ML
20 INJECTION, SOLUTION INTRAVENOUS EVERY 12 HOURS SCHEDULED
Status: DISCONTINUED | OUTPATIENT
Start: 2025-07-10 | End: 2025-07-11 | Stop reason: HOSPADM

## 2025-07-10 RX ORDER — BUSPIRONE HYDROCHLORIDE 15 MG/1
15 TABLET ORAL 2 TIMES DAILY
Status: DISCONTINUED | OUTPATIENT
Start: 2025-07-10 | End: 2025-07-11 | Stop reason: HOSPADM

## 2025-07-10 RX ADMIN — DIPHENHYDRAMINE HYDROCHLORIDE 25 MG: 50 INJECTION INTRAMUSCULAR; INTRAVENOUS at 15:49

## 2025-07-10 RX ADMIN — SODIUM CHLORIDE 125 ML/HR: 9 INJECTION, SOLUTION INTRAVENOUS at 19:37

## 2025-07-10 RX ADMIN — DEXAMETHASONE SODIUM PHOSPHATE 4 MG: 4 INJECTION, SOLUTION INTRA-ARTICULAR; INTRALESIONAL; INTRAMUSCULAR; INTRAVENOUS; SOFT TISSUE at 19:37

## 2025-07-10 RX ADMIN — BUSPIRONE HYDROCHLORIDE 15 MG: 15 TABLET ORAL at 23:03

## 2025-07-10 RX ADMIN — SODIUM CHLORIDE 1000 ML: 9 INJECTION, SOLUTION INTRAVENOUS at 15:52

## 2025-07-10 RX ADMIN — METHYLPREDNISOLONE SODIUM SUCCINATE 125 MG: 125 INJECTION, POWDER, FOR SOLUTION INTRAMUSCULAR; INTRAVENOUS at 15:49

## 2025-07-10 RX ADMIN — DIPHENHYDRAMINE HYDROCHLORIDE 25 MG: 50 INJECTION INTRAMUSCULAR; INTRAVENOUS at 17:09

## 2025-07-10 RX ADMIN — SODIUM CHLORIDE 1000 ML: 9 INJECTION, SOLUTION INTRAVENOUS at 17:51

## 2025-07-10 RX ADMIN — CETIRIZINE HYDROCHLORIDE 10 MG: 10 TABLET, FILM COATED ORAL at 19:37

## 2025-07-10 RX ADMIN — FAMOTIDINE 20 MG: 10 INJECTION INTRAVENOUS at 15:50

## 2025-07-10 RX ADMIN — QUETIAPINE FUMARATE 400 MG: 100 TABLET ORAL at 23:03

## 2025-07-10 NOTE — ED PROVIDER NOTES
Subjective   History of Present Illness  Patient is a 44-year-old white male with a history of alpha gal.  He presents to the ED today with feeling like he is having an allergic reaction.  He ate what he thought was sorbet but now thinks it may have had dairy in it as his symptoms started soon after eating this.  He also states that someone was cooking tejeda and sausage and he may have inhaled the fumes.  He complains of generalized rash that is pruritic.  States he feels like his throat is closing and scratchy.  He did take his EpiPen prior to arrival to the ED.      Review of Systems   Skin:  Positive for rash.       Past Medical History:   Diagnosis Date    Acne varioliformis     Allergic     Allergic rhinitis     Anxiety     Costochondritis 03/04/2013    Depression     Gastroesophageal reflux disease with esophagitis     History of kidney stones     Migraine with aura     PUD (peptic ulcer disease)        Allergies   Allergen Reactions    Bee Venom Anaphylaxis    Beef-Derived Drug Products Anaphylaxis     Alpha-gal    Dairy Aid [Tilactase] Anaphylaxis    Latex Anaphylaxis    Meat Extract Anaphylaxis    Milk Protein Anaphylaxis     Alpha-Gal    Other Anaphylaxis     Has Alpha gal so any mammal products    Pork-Derived Products Anaphylaxis     Alpha-gal      Zinc Gelatin [Zinc] Anaphylaxis       Past Surgical History:   Procedure Laterality Date    APPENDECTOMY      CHOLECYSTECTOMY      HYDROCELE EXCISION / REPAIR Left        Family History   Problem Relation Age of Onset    Diabetes Father         passed away due to complications    Coronary artery disease Father     Depression Mother     Diabetes Mother     Mental illness Neg Hx     Alcohol abuse Neg Hx     Drug abuse Neg Hx        Social History     Socioeconomic History    Marital status:    Tobacco Use    Smoking status: Never    Smokeless tobacco: Never   Vaping Use    Vaping status: Never Used   Substance and Sexual Activity    Alcohol use: Yes      Alcohol/week: 4.0 standard drinks of alcohol     Types: 4 Cans of beer per week    Drug use: Never    Sexual activity: Defer     Partners: Female           Objective   Physical Exam  Vital signs and triage nurse note reviewed.  Constitutional: Awake, alert; well-developed and well-nourished. No acute distress is noted.  HEENT: Normocephalic, atraumatic; pupils are PERRL with intact EOM; oropharynx is pink and moist without exudate or erythema.  No drooling or pooling of oral secretions.  No appreciable oropharyngeal swelling noted.  Neck: Supple, no cervical lymphadenopathy. Normal phonation.  Cardiovascular: Regular rate and rhythm, normal S1-S2.  No murmur noted.  Pulmonary: Respiratory effort regular nonlabored, breath sounds clear to auscultation all fields.  Musculoskeletal: Independent range of motion of all extremities with no palpable tenderness or edema.  Neuro: Alert oriented x3, speech is clear and appropriate, GCS 15.    Skin: Flesh tone, warm, dry.  Diffuse urticarial rash noted.    Procedures           ED Course  ED Course as of 07/14/25 1631   Thu Jul 10, 2025   1809 Patient is still having some itching, no evidence of angioedema.  His rash is improving however not resolved.  He has been ED for 3 hours, will admit to hospital medicine for further observation.  He did have some decreased blood pressure readings, I have initiated another bag of IV fluids.    I discussed my findings and plan of care with the patient, he agrees.     [LB]      ED Course User Index  [LB] Mirna Schaffer APRN      Labs Reviewed   COMPREHENSIVE METABOLIC PANEL - Abnormal; Notable for the following components:       Result Value    Glucose 103 (*)     BUN 23.5 (*)     Creatinine 1.48 (*)     Chloride 109 (*)     CO2 18.5 (*)     Calcium 8.0 (*)     Total Protein 5.1 (*)     AST (SGOT) 70 (*)     All other components within normal limits    Narrative:     GFR Categories in Chronic Kidney Disease (CKD)              GFR  Category          GFR (mL/min/1.73)    Interpretation  G1                    90 or greater        Normal or high (1)  G2                    60-89                Mild decrease (1)  G3a                   45-59                Mild to moderate decrease  G3b                   30-44                Moderate to severe decrease  G4                    15-29                Severe decrease  G5                    14 or less           Kidney failure    (1)In the absence of evidence of kidney disease, neither GFR category G1 or G2 fulfill the criteria for CKD.    eGFR calculation 2021 CKD-EPI creatinine equation, which does not include race as a factor   CBC WITH AUTO DIFFERENTIAL - Abnormal; Notable for the following components:    RDW 11.7 (*)     All other components within normal limits    Narrative:     Instrument flags present, additional testing may be recommended.  The previously reported component NRBC is no longer being reported. Previous result was 0.0 /100 WBC (Reference Range: 0.0-0.2 /100 WBC) on 7/10/2025 at 1917 EDT.   MANUAL DIFFERENTIAL - Abnormal; Notable for the following components:    Lymphocyte % 2.0 (*)     Monocyte % 3.0 (*)     Bands %  38.0 (*)     Metamyelocyte % 2.0 (*)     Neutrophils Absolute 7.54 (*)     Lymphocytes Absolute 0.17 (*)     All other components within normal limits   BASIC METABOLIC PANEL - Abnormal; Notable for the following components:    Glucose 139 (*)     BUN 20.3 (*)     Chloride 108 (*)     CO2 19.0 (*)     All other components within normal limits    Narrative:     GFR Categories in Chronic Kidney Disease (CKD)              GFR Category          GFR (mL/min/1.73)    Interpretation  G1                    90 or greater        Normal or high (1)  G2                    60-89                Mild decrease (1)  G3a                   45-59                Mild to moderate decrease  G3b                   30-44                Moderate to severe decrease  G4                    15-29                 Severe decrease  G5                    14 or less           Kidney failure    (1)In the absence of evidence of kidney disease, neither GFR category G1 or G2 fulfill the criteria for CKD.    eGFR calculation 2021 CKD-EPI creatinine equation, which does not include race as a factor   CBC WITH AUTO DIFFERENTIAL - Abnormal; Notable for the following components:    WBC 11.07 (*)     RDW 11.8 (*)     Neutrophil % 94.2 (*)     Lymphocyte % 2.6 (*)     Monocyte % 2.5 (*)     Eosinophil % 0.2 (*)     Neutrophils, Absolute 10.43 (*)     Lymphocytes, Absolute 0.29 (*)     All other components within normal limits   COVID-19/FLUA&B/RSV, NP SWAB IN TRANSPORT MEDIA 1 HR TAT - Normal   MAGNESIUM - Normal   MAGNESIUM - Normal   SCAN SLIDE   CBC AND DIFFERENTIAL    Narrative:     The following orders were created for panel order CBC & Differential.  Procedure                               Abnormality         Status                     ---------                               -----------         ------                     CBC Auto Differential[[id number]]        Abnormal            Final result               Scan Slide[[id number]]                                       Final result                 Please view results for these tests on the individual orders.     No radiology results for the last day  Medications   sodium chloride 0.9 % infusion (0 mL/hr Intravenous Stopped 7/11/25 0749)   sodium chloride 0.9 % bolus 1,000 mL (0 mL Intravenous Stopped 7/10/25 1751)   diphenhydrAMINE (BENADRYL) injection 25 mg (25 mg Intravenous Given 7/10/25 1549)   famotidine (PEPCID) injection 20 mg (20 mg Intravenous Given 7/10/25 1550)   methylPREDNISolone sodium succinate (SOLU-Medrol) injection 125 mg (125 mg Intravenous Given 7/10/25 1549)   diphenhydrAMINE (BENADRYL) injection 25 mg (25 mg Intravenous Given 7/10/25 1709)   sodium chloride 0.9 % bolus 1,000 mL (0 mL Intravenous Stopped 7/10/25 1937)                                                       Medical Decision Making  Patient presents today with the above complaint.    He had the above exam and evaluation.  IV was established.  He was given IV Pepcid Solu-Medrol and Benadryl.    Patient was observed for an hour and on reexamination his throat is feeling better but he reports continued itching and has continued diffuse urticarial rash.  Blood pressure still running upper 90s systolic.  He still receiving IV fluids.  He was given additional dose of Benadryl.  At this point care was turned over to PAVITHRA Bradley pending second Benadryl administration, reevaluation and disposition.    Problems Addressed:  Allergic reaction, initial encounter: complicated acute illness or injury  Pruritus: complicated acute illness or injury    Risk  Prescription drug management.  Decision regarding hospitalization.        Final diagnoses:   Allergic reaction, initial encounter   Pruritus       ED Disposition  ED Disposition       ED Disposition   Decision to Admit    Condition   --    Comment   Level of Care: Telemetry [5]   Diagnosis: Allergic reaction to alpha-gal [[id number]]   Admitting Physician: JABSIR CHASE [[id number]]                 Baldev Sandy MD  [address] [place] [address] DR MURILLO  [address] [place] [facility]  [phone]      1 week         Medication List        Stop      EPINEPHrine 0.3 MG/0.3ML solution auto-injector injection  Commonly known as: EPIPEN            ASK your doctor about these medications      EPINEPHrine 0.3 MG/0.3ML solution auto-injector injection  Commonly known as: EPIPEN  Inject 0.3 mL under the skin into the appropriate area as directed 1 (One) Time for 1 dose.  Ask about: Should I take this medication?               Where to Get Your Medications        These medications were sent to [facility]/pharmacy #[id number] - [place], [place] - [address] [facility] [phone] [place] [phone]   [address] [facility] [place] [address]      Phone: [phone]   EPINEPHrine 0.3 MG/0.3ML solution auto-injector  Nia Valdez, DOLORES  07/14/25 0174

## 2025-07-10 NOTE — H&P
[facility] Medicine Services  History & Physical    Patient Name: Warren Giraldo  : 1980  MRN: [id number]  Primary Care Physician:  Baldev Sandy MD  Date of admission: 7/10/2025  Date and Time of Service: 7/10/2025 at 6:49 PM    Subjective      Chief Complaint: Allergic reaction    History of Present Illness: Warren Giraldo is a 44 y.o. male with a CMH of non-smoker, remote alcohol abuse, depression/anxiety/bipolar disorder, GERD/PUD, kidney stones, history of cholecystectomy, L facial syndrome who presented to [facility] on 7/10/2025 with allergic reaction.    He ate sorbet around 9 AM which thought may had dairy in it, soon after he developed generalized skin rash and itching, lip swelling.    Hypotensive in ED 87/50, labs are pending  He received 2 L IV fluid bolus, 125 mg IV Solu-Medrol, Pepcid 20 mg IV, Benadryl 25 mg IV x 2 with improvement of symptoms      Review of Systems negative except as mention HPI    Personal History     Past Medical History:   Diagnosis Date    Acne varioliformis     Allergic     Allergic rhinitis     Anxiety     Costochondritis 2013    Depression     Gastroesophageal reflux disease with esophagitis     History of kidney stones     Migraine with aura     PUD (peptic ulcer disease)        Past Surgical History:   Procedure Laterality Date    APPENDECTOMY      CHOLECYSTECTOMY      HYDROCELE EXCISION / REPAIR Left        Family History: family history includes Coronary artery disease in his father; Depression in his mother; Diabetes in his father and mother. Otherwise pertinent FHx was reviewed and not pertinent to current issue.    Social History:  reports that he has never smoked. He has never used smokeless tobacco. He reports current alcohol use of about 4.0 standard drinks of alcohol per week. He reports that he does not use drugs.    Home Medications:  Prior to Admission Medications       Prescriptions Last Dose Informant Patient  Reported? Taking?    busPIRone (BUSPAR) 15 MG tablet   Yes No    cetirizine (zyrTEC) 10 MG tablet   Yes No    Take 1 tablet by mouth Daily.    cholecalciferol (VITAMIN D3) 25 MCG (1000 UT) tablet   Yes No    Take 1 tablet by mouth Daily. vegan    cyclobenzaprine (FLEXERIL) 5 MG tablet   No No    TAKE 1 TABLET BY MOUTH DAILY AS NEEDED FOR MUSCLE SPASMS.    hydrOXYzine (ATARAX) 25 MG tablet   No No    Take 1 tablet by mouth 3 (Three) Times a Day As Needed for Itching.    lamoTRIgine (LaMICtal) 150 MG tablet   Yes No    levalbuterol (XOPENEX HFA) 45 MCG/ACT inhaler   No No    Inhale 1-2 puffs Every 4 (Four) Hours As Needed for Wheezing.    LORazepam (ATIVAN) 1 MG tablet   Yes No    Take 1 tablet by mouth 2 (Two) Times a Day As Needed for Anxiety.    methylPREDNISolone (MEDROL) 4 MG dose pack   No No    Take as directed on package instructions.    Patient not taking:  Reported on 8/12/2024    multivitamin with minerals tablet tablet   Yes No    Take 1 tablet by mouth Daily.    predniSONE (DELTASONE) 20 MG tablet   No No    Take 1 tablet by mouth Daily.    QUEtiapine (SEROquel) 100 MG tablet   Yes No    Take 2 tablets by mouth Every Morning.    QUEtiapine (SEROquel) 400 MG tablet   Yes No    Take 1 tablet by mouth Every Night.    tadalafil (Cialis) 5 MG tablet   No No    Take 1 tablet by mouth Daily As Needed for Erectile Dysfunction.    venlafaxine 225 MG tablet sustained-release 24 hour 24 hr tablet   Yes No    Take 1 tablet by mouth Every Morning.              Allergies:  Allergies   Allergen Reactions    Bee Venom Anaphylaxis    Beef-Derived Drug Products Anaphylaxis     Alpha-gal    Dairy Aid [Tilactase] Anaphylaxis    Latex Anaphylaxis    Meat Extract Anaphylaxis    Milk Protein Anaphylaxis     Alpha-Gal    Other Anaphylaxis     Has Alpha gal so any mammal products    Pork-Derived Products Anaphylaxis     Alpha-gal      Zinc Gelatin [Zinc] Anaphylaxis       Objective      Vitals:   Temp:  [97.8 °F (36.6 °C)] 97.8  °F (36.6 °C)  Heart Rate:  [108-117] 110  Resp:  [17-20] 17  BP: ()/(50-66) 96/51  Body mass index is 22.38 kg/m².  Physical Exam  Constitutional:       Appearance: Normal appearance.   HENT:      Head: Normocephalic and atraumatic.      Nose: Nose normal.   Eyes:      Pupils: Pupils are equal, round, and reactive to light.   Cardiovascular:      Rate and Rhythm: Regular rhythm. Tachycardia present.      Pulses: Normal pulses.      Heart sounds: Normal heart sounds.   Pulmonary:      Effort: Pulmonary effort is normal.      Breath sounds: Normal breath sounds.   Abdominal:      General: Abdomen is flat. Bowel sounds are normal.      Palpations: Abdomen is soft.   Musculoskeletal:         General: Normal range of motion.   Skin:     Capillary Refill: Capillary refill takes less than 2 seconds.      Findings: Rash present.   Neurological:      General: No focal deficit present.      Mental Status: He is alert.   Psychiatric:         Mood and Affect: Mood normal.         Diagnostic Data:  Lab Results (last 24 hours)       ** No results found for the last 24 hours. **             Imaging Results (Last 24 Hours)       ** No results found for the last 24 hours. **              Assessment & Plan      Active and Resolved Problems  Anaphylaxis, initial encounter  Hypotension, transient  Alpha gal syndrome    Plan:  Continue IV Decadron, IV Pepcid, Claritin/Benadryl as needed  No stridor or wheezing on exam  IV fluid  Placed on telemetry  Follow-up with allergy as outpatient  Will monitor closely for ICU need        VTE Prophylaxis:  Mechanical VTE prophylaxis orders are present.        The patient desires to be as follows:    CODE STATUS:    Code Status (Patient has no pulse and is not breathing): CPR (Attempt to Resuscitate)  Medical Interventions (Patient has pulse or is breathing): Full Support        Admission Status:  I believe this patient meets observation status.    Expected Length of Stay: less than 2 midnight  stay    PDMP and Medication Dispenses via Sidebar reviewed and consistent with patient reported medications.    I discussed the patient's findings and my recommendations with patient, family, and nursing staff.      Signature:     This document has been electronically signed by Roc Espino MD on July 10, 2025 18:48 EDT   [facility]

## 2025-07-10 NOTE — Clinical Note
Level of Care: Telemetry [5]   Admitting Physician: JASBIR CHASE [[id number]]   Attending Physician: JASBIR CHASE [[id number]]

## 2025-07-11 ENCOUNTER — READMISSION MANAGEMENT (OUTPATIENT)
Dept: CALL CENTER | Facility: HOSPITAL | Age: 45
End: 2025-07-11
Payer: COMMERCIAL

## 2025-07-11 VITALS
HEART RATE: 101 BPM | OXYGEN SATURATION: 98 % | RESPIRATION RATE: 19 BRPM | BODY MASS INDEX: 22.35 KG/M2 | SYSTOLIC BLOOD PRESSURE: 102 MMHG | DIASTOLIC BLOOD PRESSURE: 64 MMHG | HEIGHT: 72 IN | WEIGHT: 165 LBS | TEMPERATURE: 97.6 F

## 2025-07-11 LAB
ANION GAP SERPL CALCULATED.3IONS-SCNC: 12 MMOL/L (ref 5–15)
BASOPHILS # BLD AUTO: 0.02 10*3/MM3 (ref 0–0.2)
BASOPHILS NFR BLD AUTO: 0.2 % (ref 0–1.5)
BUN SERPL-MCNC: 20.3 MG/DL (ref 6–20)
BUN/CREAT SERPL: 17.5 (ref 7–25)
CALCIUM SPEC-SCNC: 8.7 MG/DL (ref 8.6–10.5)
CHLORIDE SERPL-SCNC: 108 MMOL/L (ref 98–107)
CO2 SERPL-SCNC: 19 MMOL/L (ref 22–29)
CREAT SERPL-MCNC: 1.16 MG/DL (ref 0.76–1.27)
DEPRECATED RDW RBC AUTO: 41.1 FL (ref 37–54)
EGFRCR SERPLBLD CKD-EPI 2021: 80.6 ML/MIN/1.73
EOSINOPHIL # BLD AUTO: 0.02 10*3/MM3 (ref 0–0.4)
EOSINOPHIL NFR BLD AUTO: 0.2 % (ref 0.3–6.2)
ERYTHROCYTE [DISTWIDTH] IN BLOOD BY AUTOMATED COUNT: 11.8 % (ref 12.3–15.4)
GLUCOSE SERPL-MCNC: 139 MG/DL (ref 65–99)
HCT VFR BLD AUTO: 40.8 % (ref 37.5–51)
HGB BLD-MCNC: 13.6 G/DL (ref 13–17.7)
IMM GRANULOCYTES # BLD AUTO: 0.03 10*3/MM3 (ref 0–0.05)
IMM GRANULOCYTES NFR BLD AUTO: 0.3 % (ref 0–0.5)
LYMPHOCYTES # BLD AUTO: 0.29 10*3/MM3 (ref 0.7–3.1)
LYMPHOCYTES NFR BLD AUTO: 2.6 % (ref 19.6–45.3)
MAGNESIUM SERPL-MCNC: 2.2 MG/DL (ref 1.6–2.6)
MCH RBC QN AUTO: 31.6 PG (ref 26.6–33)
MCHC RBC AUTO-ENTMCNC: 33.3 G/DL (ref 31.5–35.7)
MCV RBC AUTO: 94.7 FL (ref 79–97)
MONOCYTES # BLD AUTO: 0.28 10*3/MM3 (ref 0.1–0.9)
MONOCYTES NFR BLD AUTO: 2.5 % (ref 5–12)
NEUTROPHILS NFR BLD AUTO: 10.43 10*3/MM3 (ref 1.7–7)
NEUTROPHILS NFR BLD AUTO: 94.2 % (ref 42.7–76)
NRBC BLD AUTO-RTO: 0 /100 WBC (ref 0–0.2)
PLATELET # BLD AUTO: 219 10*3/MM3 (ref 140–450)
PMV BLD AUTO: 10.4 FL (ref 6–12)
POTASSIUM SERPL-SCNC: 4.5 MMOL/L (ref 3.5–5.2)
RBC # BLD AUTO: 4.31 10*6/MM3 (ref 4.14–5.8)
SODIUM SERPL-SCNC: 139 MMOL/L (ref 136–145)
WBC NRBC COR # BLD AUTO: 11.07 10*3/MM3 (ref 3.4–10.8)

## 2025-07-11 PROCEDURE — 96361 HYDRATE IV INFUSION ADD-ON: CPT

## 2025-07-11 PROCEDURE — 25010000002 DEXAMETHASONE PER 1 MG: Performed by: HOSPITALIST

## 2025-07-11 PROCEDURE — G0378 HOSPITAL OBSERVATION PER HR: HCPCS

## 2025-07-11 PROCEDURE — 83735 ASSAY OF MAGNESIUM: CPT

## 2025-07-11 PROCEDURE — 25010000002 FAMOTIDINE 10 MG/ML SOLUTION: Performed by: HOSPITALIST

## 2025-07-11 PROCEDURE — 80048 BASIC METABOLIC PNL TOTAL CA: CPT

## 2025-07-11 PROCEDURE — 96376 TX/PRO/DX INJ SAME DRUG ADON: CPT

## 2025-07-11 PROCEDURE — 85025 COMPLETE CBC W/AUTO DIFF WBC: CPT

## 2025-07-11 RX ORDER — EPINEPHRINE 0.3 MG/.3ML
0.3 INJECTION SUBCUTANEOUS ONCE
Qty: 1 EACH | Refills: 0 | Status: SHIPPED | OUTPATIENT
Start: 2025-07-11 | End: 2025-07-11

## 2025-07-11 RX ADMIN — QUETIAPINE FUMARATE 200 MG: 100 TABLET ORAL at 07:48

## 2025-07-11 RX ADMIN — LAMOTRIGINE 300 MG: 100 TABLET ORAL at 07:48

## 2025-07-11 RX ADMIN — DEXAMETHASONE SODIUM PHOSPHATE 4 MG: 4 INJECTION, SOLUTION INTRA-ARTICULAR; INTRALESIONAL; INTRAMUSCULAR; INTRAVENOUS; SOFT TISSUE at 00:49

## 2025-07-11 RX ADMIN — CETIRIZINE HYDROCHLORIDE 10 MG: 10 TABLET, FILM COATED ORAL at 07:48

## 2025-07-11 RX ADMIN — FAMOTIDINE 20 MG: 10 INJECTION INTRAVENOUS at 05:25

## 2025-07-11 RX ADMIN — VENLAFAXINE HYDROCHLORIDE 150 MG: 75 CAPSULE, EXTENDED RELEASE ORAL at 07:48

## 2025-07-11 RX ADMIN — Medication 10 ML: at 07:49

## 2025-07-11 RX ADMIN — BUSPIRONE HYDROCHLORIDE 15 MG: 15 TABLET ORAL at 07:48

## 2025-07-11 RX ADMIN — DEXAMETHASONE SODIUM PHOSPHATE 4 MG: 4 INJECTION, SOLUTION INTRA-ARTICULAR; INTRALESIONAL; INTRAMUSCULAR; INTRAVENOUS; SOFT TISSUE at 07:48

## 2025-07-11 NOTE — ED NOTES
Nursing report ED to floor  Warren Giraldo  44 y.o.  male    HPI:   Chief Complaint   Patient presents with    Allergic Reaction     Pt reports allergic reaction after consuming ice cream around 0900 this morning. Pt c/o general itching, congestion, swelling to lips and scratchy throat. Pt reports took his epi pen around 1420       Admitting doctor:   Jeferson Christianson MD    Admitting diagnosis:   The primary encounter diagnosis was Allergic reaction, initial encounter. A diagnosis of Pruritus was also pertinent to this visit.    Code status:   Current Code Status       Date Active Code Status Order ID Comments User Context       7/10/2025 1818 CPR (Attempt to Resuscitate) [id number]  Reyna Norman APRN ED        Question Answer    Code Status (Patient has no pulse and is not breathing) CPR (Attempt to Resuscitate)    Medical Interventions (Patient has pulse or is breathing) Full Support                    Allergies:   Bee venom, Beef-derived drug products, Dairy aid [tilactase], Latex, Meat extract, Milk protein, Other, Pork-derived products, and Zinc gelatin [zinc]    Isolation:  No active isolations     Fall Risk:  Fall Risk Assessment was completed, and patient is at low risk for falls.   Predictive Model Details         9 (Low) Factor Value    Calculated 7/10/2025 20:42 Age 44    Risk of Fall Model Active Peripheral IV Present     Magnesium 1.6 mg/dL     Number of Distinct Medication Classes administered 4     Calcium 8 mg/dL     Respiratory Rate 17     Darren Scale not on file     Albumin 3.6 g/dL     Chloride 109 mmol/L     Creatinine 1.48 mg/dL     Clinically Relevant Sex Not Female     Diastolic BP 83     Number of administrations of Ulcer Drugs 1     Cardiac Assessment X     ALT 40 U/L     Total Bilirubin 0.5 mg/dL     Duration of Current Encounter 0.239 days     Potassium 4.2 mmol/L         Weight:       07/10/25  1454   Weight: 74.8 kg (165 lb)       Intake and Output    Intake/Output Summary (Last  24 hours) at 7/10/2025 2043  Last data filed at 7/10/2025 1751  Gross per 24 hour   Intake 1000 ml   Output --   Net 1000 ml       Diet:   Dietary Orders (From admission, onward)       Start     Ordered    07/10/25 1815  Diet: Vegetarian; Vegan (No animal products); Fluid Consistency: Thin (IDDSI 0)  Diet Effective Now        References:    Diet Order Definitions   Question Answer Comment   Diets: Vegetarian    Vegetarian: Vegan (No animal products)    Fluid Consistency: Thin (IDDSI 0)        07/10/25 1818                     Most recent vitals:   Vitals:    07/10/25 1946 07/10/25 2000 07/10/25 2015 07/10/25 2030   BP: 102/78 105/71 104/69 102/83   BP Location:       Patient Position:       Pulse: 107 107 105 109   Resp:   15 17   Temp:       TempSrc:       SpO2: 96% 98% 99% 97%   Weight:       Height:           Active LDAs/IV Access:   Lines, Drains & Airways       Active LDAs       Name Placement date Placement time Site Days    Peripheral IV 07/10/25 1549 20 G Anterior;Right Forearm 07/10/25  1549  Forearm  less than 1                    Skin Condition:   Skin Assessments (last day)       None             Labs (abnormal labs have a star):   Labs Reviewed   COMPREHENSIVE METABOLIC PANEL - Abnormal; Notable for the following components:       Result Value    Glucose 103 (*)     BUN 23.5 (*)     Creatinine 1.48 (*)     Chloride 109 (*)     CO2 18.5 (*)     Calcium 8.0 (*)     Total Protein 5.1 (*)     AST (SGOT) 70 (*)     All other components within normal limits    Narrative:     GFR Categories in Chronic Kidney Disease (CKD)              GFR Category          GFR (mL/min/1.73)    Interpretation  G1                    90 or greater        Normal or high (1)  G2                    60-89                Mild decrease (1)  G3a                   45-59                Mild to moderate decrease  G3b                   30-44                Moderate to severe decrease  G4                    15-29                Severe  decrease  G5                    14 or less           Kidney failure    (1)In the absence of evidence of kidney disease, neither GFR category G1 or G2 fulfill the criteria for CKD.    eGFR calculation 2021 CKD-EPI creatinine equation, which does not include race as a factor   CBC WITH AUTO DIFFERENTIAL - Abnormal; Notable for the following components:    RDW 11.7 (*)     All other components within normal limits    Narrative:     Instrument flags present, additional testing may be recommended.  The previously reported component NRBC is no longer being reported. Previous result was 0.0 /100 WBC (Reference Range: 0.0-0.2 /100 WBC) on 7/10/2025 at 1917 EDT.   MANUAL DIFFERENTIAL - Abnormal; Notable for the following components:    Lymphocyte % 2.0 (*)     Monocyte % 3.0 (*)     Bands %  38.0 (*)     Metamyelocyte % 2.0 (*)     Neutrophils Absolute 7.54 (*)     Lymphocytes Absolute 0.17 (*)     All other components within normal limits   COVID-19/FLUA&B/RSV, NP SWAB IN TRANSPORT MEDIA 1 HR TAT - Normal   MAGNESIUM - Normal   SCAN SLIDE   CBC AND DIFFERENTIAL    Narrative:     The following orders were created for panel order CBC & Differential.  Procedure                               Abnormality         Status                     ---------                               -----------         ------                     CBC Auto Differential[[id number]]        Abnormal            Final result               Scan Slide[[id number]]                                       Final result                 Please view results for these tests on the individual orders.       LOC: Person, Place, Time, and Situation    Telemetry:  Telemetry    Cardiac Monitoring Ordered: yes    EKG:   Telemetry Scan   Final Result      ECG 12 Lead Chest Pain   Preliminary Result   HEART PXHW=323  bpm   RR Bqsvuqjy=102  ms   WY Xyxlbjmc=142  ms   P Horizontal Axis=-42  deg   P Front Axis=53  deg   QRSD Interval=82  ms   QT Wmeoauzx=405  ms   KHdY=512  ms   QRS  Axis=49  deg   T Wave Axis=39  deg   - BORDERLINE ECG -   Sinus tachycardia   Probable left atrial enlargement   Date and Time of Study:2025-07-10 14:58:07          Medications Given in the ED:   Medications   sodium chloride 0.9 % flush 10 mL (has no administration in time range)   sodium chloride 0.9 % flush 10 mL (has no administration in time range)   sodium chloride 0.9 % flush 10 mL (has no administration in time range)   sodium chloride 0.9 % infusion 40 mL (has no administration in time range)   Potassium Replacement - Follow Nurse / BPA Driven Protocol (has no administration in time range)   Magnesium Standard Dose Replacement - Follow Nurse / BPA Driven Protocol (has no administration in time range)   Phosphorus Replacement - Follow Nurse / BPA Driven Protocol (has no administration in time range)   Calcium Replacement - Follow Nurse / BPA Driven Protocol (has no administration in time range)   acetaminophen (TYLENOL) tablet 650 mg (has no administration in time range)     Or   acetaminophen (TYLENOL) 160 MG/5ML oral solution 650 mg (has no administration in time range)     Or   acetaminophen (TYLENOL) suppository 650 mg (has no administration in time range)   ondansetron ODT (ZOFRAN-ODT) disintegrating tablet 4 mg (has no administration in time range)     Or   ondansetron (ZOFRAN) injection 4 mg (has no administration in time range)   sodium chloride 0.9 % infusion (125 mL/hr Intravenous New Bag 7/10/25 1937)   dexAMETHasone (DECADRON) injection 4 mg (4 mg Intravenous Given 7/10/25 1937)   famotidine (PEPCID) injection 20 mg (has no administration in time range)   cetirizine (zyrTEC) tablet 10 mg (10 mg Oral Given 7/10/25 1937)   diphenhydrAMINE (BENADRYL) capsule 25 mg (has no administration in time range)   sodium chloride 0.9 % bolus 1,000 mL (0 mL Intravenous Stopped 7/10/25 1751)   diphenhydrAMINE (BENADRYL) injection 25 mg (25 mg Intravenous Given 7/10/25 1549)   famotidine (PEPCID) injection 20 mg  (20 mg Intravenous Given 7/10/25 1550)   methylPREDNISolone sodium succinate (SOLU-Medrol) injection 125 mg (125 mg Intravenous Given 7/10/25 1549)   diphenhydrAMINE (BENADRYL) injection 25 mg (25 mg Intravenous Given 7/10/25 1709)   sodium chloride 0.9 % bolus 1,000 mL (0 mL Intravenous Stopped 7/10/25 1937)       Imaging results:  No radiology results for the last day    Social issues:   Social History     Socioeconomic History    Marital status:    Tobacco Use    Smoking status: Never    Smokeless tobacco: Never   Vaping Use    Vaping status: Never Used   Substance and Sexual Activity    Alcohol use: Yes     Alcohol/week: 4.0 standard drinks of alcohol     Types: 4 Cans of beer per week    Drug use: Never    Sexual activity: Yes     Partners: Female       NIH Stroke Scale:  Interval: (not recorded)  1a. Level of Consciousness: (not recorded)  1b. LOC Questions: (not recorded)  1c. LOC Commands: (not recorded)  2. Best Gaze: (not recorded)  3. Visual: (not recorded)  4. Facial Palsy: (not recorded)  5a. Motor Arm, Left: (not recorded)  5b. Motor Arm, Right: (not recorded)  6a. Motor Leg, Left: (not recorded)  6b. Motor Leg, Right: (not recorded)  7. Limb Ataxia: (not recorded)  8. Sensory: (not recorded)  9. Best Language: (not recorded)  10. Dysarthria: (not recorded)  11. Extinction and Inattention (formerly Neglect): (not recorded)    Total (NIH Stroke Scale): (not recorded)     Additional notable assessment information:.     Nursing report ED to floor:  Azalea Wharton RN   07/10/25 20:43 EDT

## 2025-07-11 NOTE — CASE MANAGEMENT/SOCIAL WORK
Case Management Discharge Note      Final Note: Home.         Selected Continued Care - Discharged on 7/11/2025 Admission date: 7/10/2025 - Discharge disposition: Home or Self Care     Transportation Services  Transportation: Private Transportation  Private: Car    Final Discharge Disposition Code: 01 - home or self-care

## 2025-07-11 NOTE — DISCHARGE SUMMARY
[facility] Medicine Services  Discharge Summary    Date of Service: 2025  Patient Name: Warren Giraldo  : 1980  MRN: [id number]    Date of Admission: 7/10/2025  Discharge Diagnosis: Allergic reaction to alpha-gal  Date of Discharge: 2025  Primary Care Physician: Baldev Sandy MD      Presenting Problem:   Pruritus [L29.9]  Allergic reaction, initial encounter [T78.40XA]  Allergic reaction to alpha-gal [T78.1XXA]    Active and Resolved Hospital Problems:  Active Hospital Problems    Diagnosis POA    **Allergic reaction to alpha-gal [T78.1XXA] Yes      Resolved Hospital Problems   No resolved problems to display.     Anaphylaxis, initial encounter  Hypotension, transient  Alpha gal syndrome     Plan:  received Decadron,   Pepcid, Claritin/Benadryl as needed  No stridor or wheezing on exam  IV fluid dc  Follow-up with allergy as outpatient    Hospital Course     History of Present Illness: Warren Giraldo is a 44 y.o. male with a CMH of non-smoker, remote alcohol abuse, depression/anxiety/bipolar disorder, GERD/PUD, kidney stones, history of cholecystectomy, L facial syndrome who presented to [facility] on 7/10/2025 with allergic reaction.     He ate sorbet around 9 AM which thought may had dairy in it, soon after he developed generalized skin rash and itching, lip swelling.     Hypotensive in ED 87/50, labs are pending  He received 2 L IV fluid bolus, 125 mg IV Solu-Medrol, Pepcid 20 mg IV, Benadryl 25 mg IV x 2 with improvement of symptoms        seen in bed NAD, doing better today, will dc home, condition on dc stable    DISCHARGE Follow Up Recommendations for labs and diagnostics: follow with pcp in one week  Day of Discharge     Vital Signs:  Temp:  [96.1 °F (35.6 °C)-98.3 °F (36.8 °C)] 97.6 °F (36.4 °C)  Heart Rate:  [] 101  Resp:  [15-20] 19  BP: ()/(50-83) 102/64    Physical Exam      Appearance: Normal appearance.   HENT:      Head:  Normocephalic and atraumatic.      Nose: Nose normal.   Eyes:      Pupils: Pupils are equal, round, and reactive to light.   Cardiovascular:      Rate and Rhythm: Regular rhythm. Tachycardia present.      Pulses: Normal pulses.      Heart sounds: Normal heart sounds.   Pulmonary:      Effort: Pulmonary effort is normal.      Breath sounds: Normal breath sounds.   Abdominal:      General: Abdomen is flat. Bowel sounds are normal.      Palpations: Abdomen is soft.   Musculoskeletal:         General: Normal range of motion.   Skin:     Capillary Refill: Capillary refill takes less than 2 seconds.      Findings: Rash present.   Neurological:      General: No focal deficit present.      Mental Status: He is alert.   Psychiatric:         Mood and Affect: Mood normal.          Pertinent  and/or Most Recent Results     LAB RESULTS:      Lab 07/11/25  0432 07/10/25  1906   WBC 11.07* 8.38   HEMOGLOBIN 13.6 14.4   HEMATOCRIT 40.8 43.3   PLATELETS 219 188   NEUTROS ABS 10.43* 7.54*   IMMATURE GRANS (ABS) 0.03  --    LYMPHS ABS 0.29*  --    MONOS ABS 0.28  --    EOS ABS 0.02 0.25   MCV 94.7 95.2         Lab 07/11/25  0432 07/10/25  1906   SODIUM 139 137   POTASSIUM 4.5 4.2   CHLORIDE 108* 109*   CO2 19.0* 18.5*   ANION GAP 12.0 9.5   BUN 20.3* 23.5*   CREATININE 1.16 1.48*   EGFR 80.6 60.2   GLUCOSE 139* 103*   CALCIUM 8.7 8.0*   MAGNESIUM 2.2 1.6         Lab 07/10/25  1906   TOTAL PROTEIN 5.1*   ALBUMIN 3.6   GLOBULIN 1.5   ALT (SGPT) 40   AST (SGOT) 70*   BILIRUBIN 0.5   ALK PHOS 48                     Brief Urine Lab Results  (Last result in the past 365 days)        Color   Clarity   Blood   Leuk Est   Nitrite   Protein   CREAT   Urine HCG        08/12/24 1149 Yellow   Clear   Negative   Negative   Negative   Negative                 Microbiology Results (last 10 days)       Procedure Component Value - Date/Time    COVID-19, FLU A/B, RSV PCR 1 HR TAT - Swab, Nasopharynx [[id number]]  (Normal) Collected: 07/10/25 1949    Lab  Status: Final result Specimen: Swab from Nasopharynx Updated: 07/10/25 2031     COVID19 Not Detected     Influenza A PCR Not Detected     Influenza B PCR Not Detected     RSV, PCR Not Detected                             Labs Pending at Discharge:  Pending Results       None            Procedures Performed           Consults:   Consults       Date and Time Order Name Status Description    7/10/2025  5:45 PM Hospitalist (on-call MD unless specified)                Discharge Details        Discharge Medications        Continue These Medications        Instructions Start Date   busPIRone 15 MG tablet  Commonly known as: BUSPAR   15 mg, 2 Times Daily      cetirizine 10 MG tablet  Commonly known as: zyrTEC   10 mg, Daily      doxylamine 25 MG tablet  Commonly known as: UNISOM   50 mg, Nightly PRN      EPINEPHrine 0.3 MG/0.3ML solution auto-injector injection  Commonly known as: EPIPEN   0.3 mg, Subcutaneous, Once      lamoTRIgine 150 MG tablet  Commonly known as: LaMICtal   300 mg, Daily      levalbuterol 45 MCG/ACT inhaler  Commonly known as: XOPENEX HFA   1-2 puffs, Inhalation, Every 4 Hours PRN      LORazepam 1 MG tablet  Commonly known as: ATIVAN   1 mg, Every 8 Hours PRN      multivitamin with minerals tablet tablet   1 tablet, Daily      QUEtiapine 200 MG tablet  Commonly known as: SEROquel   200 mg, Every Morning      QUEtiapine 400 MG tablet  Commonly known as: SEROquel   400 mg, Nightly      tadalafil 5 MG tablet  Commonly known as: Cialis   5 mg, Oral, Daily PRN      venlafaxine  MG 24 hr capsule  Commonly known as: EFFEXOR-XR   300 mg, Daily With Breakfast               Allergies   Allergen Reactions    Bee Venom Anaphylaxis    Beef-Derived Drug Products Anaphylaxis     Alpha-gal    Dairy Aid [Tilactase] Anaphylaxis    Latex Anaphylaxis    Meat Extract Anaphylaxis    Milk Protein Anaphylaxis     Alpha-Gal    Other Anaphylaxis     Has Alpha gal so any mammal products    Pork-Derived Products Anaphylaxis      Alpha-gal      Zinc Gelatin [Zinc] Anaphylaxis         Discharge Disposition:   Home or Self Care    Diet:  Hospital:  Diet Order   Procedures    Diet: Vegetarian; Vegan (No animal products); Fluid Consistency: Thin (IDDSI 0)         Discharge Activity:   Activity Instructions       Gradually Increase Activity Until at Pre-Hospitalization Level                CODE STATUS:  Code Status and Medical Interventions: CPR (Attempt to Resuscitate); Full Support   Ordered at: 07/10/25 181     Code Status (Patient has no pulse and is not breathing):    CPR (Attempt to Resuscitate)     Medical Interventions (Patient has pulse or is breathing):    Full Support         No future appointments.    Additional Instructions for the Follow-ups that You Need to Schedule       Discharge Follow-up with PCP   As directed       Currently Documented PCP:    Baldev Sandy MD    PCP Phone Number:    [phone]     Follow Up Details: 1 week                Time spent on Discharge including face to face service:  34 minutes    Signature: Electronically signed by Olivier Mcmanus MD, 07/11/25, 10:43 EDT.  [facility] Hospitalist Team

## 2025-07-11 NOTE — PLAN OF CARE
Problem: Adult Inpatient Plan of Care  Goal: Plan of Care Review  Outcome: Met  Goal: Patient-Specific Goal (Individualized)  Outcome: Met  Goal: Absence of Hospital-Acquired Illness or Injury  Outcome: Met  Intervention: Identify and Manage Fall Risk  Recent Flowsheet Documentation  Taken 7/11/2025 0748 by Rhoda Jackson RN  Safety Promotion/Fall Prevention:   safety round/check completed   room organization consistent   nonskid shoes/slippers when out of bed   lighting adjusted   clutter free environment maintained   assistive device/personal items within reach  Intervention: Prevent Skin Injury  Recent Flowsheet Documentation  Taken 7/11/2025 0748 by Rhoda Jackson RN  Body Position:   position changed independently   sitting up in bed  Intervention: Prevent and Manage VTE (Venous Thromboembolism) Risk  Recent Flowsheet Documentation  Taken 7/11/2025 0748 by Rhoda Jackson RN  VTE Prevention/Management:   bilateral   SCDs (sequential compression devices) off  Intervention: Prevent Infection  Recent Flowsheet Documentation  Taken 7/11/2025 0748 by Rhoda Jackson RN  Infection Prevention:   hand hygiene promoted   personal protective equipment utilized   rest/sleep promoted   single patient room provided  Goal: Optimal Comfort and Wellbeing  Outcome: Met  Intervention: Provide Person-Centered Care  Recent Flowsheet Documentation  Taken 7/11/2025 0748 by Rhoda Jackson RN  Trust Relationship/Rapport:   care explained   choices provided   emotional support provided   empathic listening provided   questions answered   reassurance provided   thoughts/feelings acknowledged  Goal: Readiness for Transition of Care  Outcome: Met  Goal: Plan of Care Review  Outcome: Met  Goal: Patient-Specific Goal (Individualized)  Outcome: Met  Goal: Absence of Hospital-Acquired Illness or Injury  Outcome: Met  Intervention: Identify and Manage Fall Risk  Recent Flowsheet Documentation  Taken 7/11/2025 0748 by Rhoda Jackson  RN  Safety Promotion/Fall Prevention:   safety round/check completed   room organization consistent   nonskid shoes/slippers when out of bed   lighting adjusted   clutter free environment maintained   assistive device/personal items within reach  Intervention: Prevent Skin Injury  Recent Flowsheet Documentation  Taken 7/11/2025 0748 by Rhoda Jackson RN  Body Position:   position changed independently   sitting up in bed  Intervention: Prevent and Manage VTE (Venous Thromboembolism) Risk  Recent Flowsheet Documentation  Taken 7/11/2025 0748 by Rhoda Jackson RN  VTE Prevention/Management:   bilateral   SCDs (sequential compression devices) off  Intervention: Prevent Infection  Recent Flowsheet Documentation  Taken 7/11/2025 0748 by Rhoda Jackson RN  Infection Prevention:   hand hygiene promoted   personal protective equipment utilized   rest/sleep promoted   single patient room provided  Goal: Optimal Comfort and Wellbeing  Outcome: Met  Intervention: Provide Person-Centered Care  Recent Flowsheet Documentation  Taken 7/11/2025 0748 by Rhoda Jackson RN  Trust Relationship/Rapport:   care explained   choices provided   emotional support provided   empathic listening provided   questions answered   reassurance provided   thoughts/feelings acknowledged  Goal: Readiness for Transition of Care  Outcome: Met     Problem: Pain Acute  Goal: Optimal Pain Control and Function  Outcome: Met  Intervention: Optimize Psychosocial Wellbeing  Recent Flowsheet Documentation  Taken 7/11/2025 0748 by Rhoda Jackson RN  Supportive Measures: active listening utilized  Diversional Activities:   smartphone   television  Intervention: Prevent or Manage Pain  Recent Flowsheet Documentation  Taken 7/11/2025 0748 by Rhoda Jackson RN  Medication Review/Management: medications reviewed   Goal Outcome Evaluation:

## 2025-07-11 NOTE — PLAN OF CARE
Goal Outcome Evaluation:      BP stable, room air, denies pain/soa, new admit from ED, ivf/iv steroids/iv pepcid, up ad cynthia, vegan diet

## 2025-07-14 ENCOUNTER — TRANSITIONAL CARE MANAGEMENT TELEPHONE ENCOUNTER (OUTPATIENT)
Dept: CALL CENTER | Facility: HOSPITAL | Age: 45
End: 2025-07-14
Payer: COMMERCIAL

## 2025-07-14 NOTE — OUTREACH NOTE
Call Center TCM Note      Flowsheet Row Responses   [facility] patient discharged from? [facility]   Does the patient have one of the following disease processes/diagnoses(primary or secondary)? Other   TCM attempt successful? 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 Yes   Is the patient having any side effects they believe may be caused by any medication additions or changes? No   Does the patient have all medications ordered at discharge? Yes   Is the patient taking all medications as directed (includes completed medication regime)? Yes   Comments Wife will call PCP office as Pt can only do appts on Mondays. TCM call complete.   Does the patient have an appointment with their PCP within 7-14 days of discharge? No   Nursing Interventions Routed TCM call to PCP office, Patient declined scheduling/rescheduling appointment at this time   Has home health visited the patient within 72 hours of discharge? N/A   Psychosocial issues? No   Did the patient receive a copy of their discharge instructions? Yes   Nursing interventions Reviewed instructions with patient   What is the patient's perception of their health status since discharge? Improving   Is the patient/caregiver able to teach back signs and symptoms related to disease process for when to call PCP? Yes   Is the patient/caregiver able to teach back signs and symptoms related to disease process for when to call 911? Yes   Is the patient/caregiver able to teach back the hierarchy of who to call/visit for symptoms/problems? PCP, Specialist, Home health nurse, Urgent Care, ED, 911 Yes   TCM call completed? Yes   Wrap up additional comments Wife reports Pt is doing well. No needs. She will call PCP office for appt as she only can take pt on Mondays.   Call end time 1443            MAGNOLIA RODRIGEZ - Registered  Nurse    7/14/2025, 14:43 EDT

## 2025-08-14 DIAGNOSIS — N52.9 ERECTILE DYSFUNCTION, UNSPECIFIED ERECTILE DYSFUNCTION TYPE: ICD-10-CM

## 2025-08-14 RX ORDER — TADALAFIL 5 MG/1
5 TABLET ORAL DAILY PRN
Qty: 30 TABLET | Refills: 11 | Status: SHIPPED | OUTPATIENT
Start: 2025-08-14